# Patient Record
Sex: FEMALE | Race: BLACK OR AFRICAN AMERICAN | NOT HISPANIC OR LATINO | URBAN - METROPOLITAN AREA
[De-identification: names, ages, dates, MRNs, and addresses within clinical notes are randomized per-mention and may not be internally consistent; named-entity substitution may affect disease eponyms.]

---

## 2017-04-01 ENCOUNTER — OUTPATIENT (OUTPATIENT)
Dept: OUTPATIENT SERVICES | Facility: HOSPITAL | Age: 67
LOS: 1 days | End: 2017-04-01
Payer: MEDICAID

## 2017-04-17 DIAGNOSIS — R69 ILLNESS, UNSPECIFIED: ICD-10-CM

## 2017-06-01 PROCEDURE — G9001: CPT

## 2022-09-20 ENCOUNTER — OUTPATIENT (OUTPATIENT)
Dept: OUTPATIENT SERVICES | Facility: HOSPITAL | Age: 72
LOS: 1 days | End: 2022-09-20

## 2022-09-20 VITALS
SYSTOLIC BLOOD PRESSURE: 153 MMHG | OXYGEN SATURATION: 97 % | WEIGHT: 166.01 LBS | RESPIRATION RATE: 16 BRPM | DIASTOLIC BLOOD PRESSURE: 82 MMHG | TEMPERATURE: 98 F | HEART RATE: 97 BPM | HEIGHT: 65 IN

## 2022-09-20 DIAGNOSIS — R54 AGE-RELATED PHYSICAL DEBILITY: ICD-10-CM

## 2022-09-20 DIAGNOSIS — C25.9 MALIGNANT NEOPLASM OF PANCREAS, UNSPECIFIED: ICD-10-CM

## 2022-09-20 DIAGNOSIS — E11.9 TYPE 2 DIABETES MELLITUS WITHOUT COMPLICATIONS: ICD-10-CM

## 2022-09-20 DIAGNOSIS — Z87.898 PERSONAL HISTORY OF OTHER SPECIFIED CONDITIONS: Chronic | ICD-10-CM

## 2022-09-20 DIAGNOSIS — D25.9 LEIOMYOMA OF UTERUS, UNSPECIFIED: Chronic | ICD-10-CM

## 2022-09-20 LAB
A1C WITH ESTIMATED AVERAGE GLUCOSE RESULT: 6.5 % — HIGH (ref 4–5.6)
ALBUMIN SERPL ELPH-MCNC: 4.4 G/DL — SIGNIFICANT CHANGE UP (ref 3.3–5)
ALP SERPL-CCNC: 113 U/L — SIGNIFICANT CHANGE UP (ref 40–120)
ALT FLD-CCNC: 29 U/L — SIGNIFICANT CHANGE UP (ref 4–33)
ANION GAP SERPL CALC-SCNC: 12 MMOL/L — SIGNIFICANT CHANGE UP (ref 7–14)
AST SERPL-CCNC: 20 U/L — SIGNIFICANT CHANGE UP (ref 4–32)
BILIRUB SERPL-MCNC: 0.9 MG/DL — SIGNIFICANT CHANGE UP (ref 0.2–1.2)
BLD GP AB SCN SERPL QL: NEGATIVE — SIGNIFICANT CHANGE UP
BUN SERPL-MCNC: 31 MG/DL — HIGH (ref 7–23)
CALCIUM SERPL-MCNC: 10.4 MG/DL — SIGNIFICANT CHANGE UP (ref 8.4–10.5)
CHLORIDE SERPL-SCNC: 100 MMOL/L — SIGNIFICANT CHANGE UP (ref 98–107)
CO2 SERPL-SCNC: 24 MMOL/L — SIGNIFICANT CHANGE UP (ref 22–31)
CREAT SERPL-MCNC: 1.14 MG/DL — SIGNIFICANT CHANGE UP (ref 0.5–1.3)
EGFR: 51 ML/MIN/1.73M2 — LOW
ESTIMATED AVERAGE GLUCOSE: 140 — SIGNIFICANT CHANGE UP
GLUCOSE SERPL-MCNC: 196 MG/DL — HIGH (ref 70–99)
HCT VFR BLD CALC: 43.7 % — SIGNIFICANT CHANGE UP (ref 34.5–45)
HGB BLD-MCNC: 14.5 G/DL — SIGNIFICANT CHANGE UP (ref 11.5–15.5)
MCHC RBC-ENTMCNC: 30.3 PG — SIGNIFICANT CHANGE UP (ref 27–34)
MCHC RBC-ENTMCNC: 33.2 GM/DL — SIGNIFICANT CHANGE UP (ref 32–36)
MCV RBC AUTO: 91.2 FL — SIGNIFICANT CHANGE UP (ref 80–100)
NRBC # BLD: 0 /100 WBCS — SIGNIFICANT CHANGE UP (ref 0–0)
NRBC # FLD: 0 K/UL — SIGNIFICANT CHANGE UP (ref 0–0)
PLATELET # BLD AUTO: 245 K/UL — SIGNIFICANT CHANGE UP (ref 150–400)
POTASSIUM SERPL-MCNC: 4.2 MMOL/L — SIGNIFICANT CHANGE UP (ref 3.5–5.3)
POTASSIUM SERPL-SCNC: 4.2 MMOL/L — SIGNIFICANT CHANGE UP (ref 3.5–5.3)
PROT SERPL-MCNC: 7.4 G/DL — SIGNIFICANT CHANGE UP (ref 6–8.3)
RBC # BLD: 4.79 M/UL — SIGNIFICANT CHANGE UP (ref 3.8–5.2)
RBC # FLD: 12.5 % — SIGNIFICANT CHANGE UP (ref 10.3–14.5)
RH IG SCN BLD-IMP: POSITIVE — SIGNIFICANT CHANGE UP
SODIUM SERPL-SCNC: 136 MMOL/L — SIGNIFICANT CHANGE UP (ref 135–145)
WBC # BLD: 7.23 K/UL — SIGNIFICANT CHANGE UP (ref 3.8–10.5)
WBC # FLD AUTO: 7.23 K/UL — SIGNIFICANT CHANGE UP (ref 3.8–10.5)

## 2022-09-20 PROCEDURE — 93010 ELECTROCARDIOGRAM REPORT: CPT

## 2022-09-20 RX ORDER — METOPROLOL TARTRATE 50 MG
1 TABLET ORAL
Qty: 0 | Refills: 0 | DISCHARGE

## 2022-09-20 RX ORDER — SODIUM CHLORIDE 9 MG/ML
1000 INJECTION, SOLUTION INTRAVENOUS
Refills: 0 | Status: DISCONTINUED | OUTPATIENT
Start: 2022-09-22 | End: 2022-09-22

## 2022-09-20 NOTE — H&P PST ADULT - PROBLEM SELECTOR PLAN 2
Await medical evaluation with pcp due to advanced age; h/o DM, and having major surgery  * Instructed to take normal am dose of hydralazine, olmesartan, and metoprolol the am of surgery Await prearranged medical evaluation with pcp due to advanced age; h/o DM, and having major surgery  * Instructed to take normal am dose of hydralazine, olmesartan, and metoprolol the am of surgery

## 2022-09-20 NOTE — H&P PST ADULT - HISTORY OF PRESENT ILLNESS
This is a 73 y/o female whose native language is Georgian Creole; comprehends and verbalizes minimal English. Refused hospital ; requested sister Mariaa function as . Patient   presents with diagnosis of pancreatic cancer on endoscopy biopsy. Subsequent CT scan and PET scan confirm pathology. Scheduled for whipple procedure pancreatic cancer

## 2022-09-20 NOTE — H&P PST ADULT - ENDOCRINE COMMENTS
DM type II -- finger sticks in the am range 110-120; in the afternoon 110-200; in the evening 100-200; denies thyroid disease

## 2022-09-20 NOTE — H&P PST ADULT - NSICDXPASTMEDICALHX_GEN_ALL_CORE_FT
PAST MEDICAL HISTORY:  Anxiety and depression     Hypertension     Language barrier native language Korean Creole; comprehends and verbzlizes minimal English    Pancreatic cancer September 2022    Type 2 diabetes mellitus

## 2022-09-20 NOTE — H&P PST ADULT - PROBLEM SELECTOR PLAN 1
This is a 73 y/o female who is scheduled for whipple procedure pancreatic cancer on 9-22-22  * Had covid-19 pcr testing done yesterday  * Given preop and cleanser instructions with good teach back and patient verbalized understanding

## 2022-09-20 NOTE — H&P PST ADULT - PROBLEM SELECTOR PLAN 3
Instructed to stop Invokana 3 days before surgery  * Instructed to take Tresiba 20 units the am of surgery

## 2022-09-20 NOTE — H&P PST ADULT - NSICDXPASTSURGICALHX_GEN_ALL_CORE_FT
PAST SURGICAL HISTORY:  H/O jaundice biliary stent in place    Uterine fibroid hysterectomyu 1988

## 2022-09-21 NOTE — ASU PATIENT PROFILE, ADULT - NSICDXPASTMEDICALHX_GEN_ALL_CORE_FT
PAST MEDICAL HISTORY:  Anxiety and depression     Hypertension     Language barrier native language Lithuanian Creole; comprehends and verbzlizes minimal English    Pancreatic cancer September 2022    Type 2 diabetes mellitus

## 2022-09-22 ENCOUNTER — INPATIENT (INPATIENT)
Facility: HOSPITAL | Age: 72
LOS: 27 days | Discharge: HOME | End: 2022-10-20
Attending: SPECIALIST | Admitting: SPECIALIST
Payer: MEDICARE

## 2022-09-22 VITALS
OXYGEN SATURATION: 98 % | RESPIRATION RATE: 14 BRPM | WEIGHT: 166.01 LBS | HEART RATE: 73 BPM | HEIGHT: 65 IN | SYSTOLIC BLOOD PRESSURE: 151 MMHG | DIASTOLIC BLOOD PRESSURE: 72 MMHG | TEMPERATURE: 98 F

## 2022-09-22 DIAGNOSIS — D25.9 LEIOMYOMA OF UTERUS, UNSPECIFIED: Chronic | ICD-10-CM

## 2022-09-22 DIAGNOSIS — Z87.898 PERSONAL HISTORY OF OTHER SPECIFIED CONDITIONS: Chronic | ICD-10-CM

## 2022-09-22 DIAGNOSIS — C25.9 MALIGNANT NEOPLASM OF PANCREAS, UNSPECIFIED: ICD-10-CM

## 2022-09-22 LAB
ANION GAP SERPL CALC-SCNC: 15 MMOL/L — HIGH (ref 7–14)
APTT BLD: 28.9 SEC — SIGNIFICANT CHANGE UP (ref 27–36.3)
BLOOD GAS ARTERIAL - LYTES,HGB,ICA,LACT RESULT: SIGNIFICANT CHANGE UP
BLOOD GAS ARTERIAL COMPREHENSIVE RESULT: SIGNIFICANT CHANGE UP
BUN SERPL-MCNC: 19 MG/DL — SIGNIFICANT CHANGE UP (ref 7–23)
CALCIUM SERPL-MCNC: 8.6 MG/DL — SIGNIFICANT CHANGE UP (ref 8.4–10.5)
CHLORIDE SERPL-SCNC: 99 MMOL/L — SIGNIFICANT CHANGE UP (ref 98–107)
CO2 SERPL-SCNC: 19 MMOL/L — LOW (ref 22–31)
CREAT SERPL-MCNC: 0.89 MG/DL — SIGNIFICANT CHANGE UP (ref 0.5–1.3)
EGFR: 69 ML/MIN/1.73M2 — SIGNIFICANT CHANGE UP
GAS PNL BLDA: SIGNIFICANT CHANGE UP
GAS PNL BLDA: SIGNIFICANT CHANGE UP
GLUCOSE SERPL-MCNC: 242 MG/DL — HIGH (ref 70–99)
HCT VFR BLD CALC: 40.2 % — SIGNIFICANT CHANGE UP (ref 34.5–45)
HGB BLD-MCNC: 13.2 G/DL — SIGNIFICANT CHANGE UP (ref 11.5–15.5)
INR BLD: 1.36 RATIO — HIGH (ref 0.88–1.16)
MAGNESIUM SERPL-MCNC: 1.4 MG/DL — LOW (ref 1.6–2.6)
MCHC RBC-ENTMCNC: 29.1 PG — SIGNIFICANT CHANGE UP (ref 27–34)
MCHC RBC-ENTMCNC: 32.8 GM/DL — SIGNIFICANT CHANGE UP (ref 32–36)
MCV RBC AUTO: 88.7 FL — SIGNIFICANT CHANGE UP (ref 80–100)
NRBC # BLD: 0 /100 WBCS — SIGNIFICANT CHANGE UP (ref 0–0)
NRBC # FLD: 0 K/UL — SIGNIFICANT CHANGE UP (ref 0–0)
PHOSPHATE SERPL-MCNC: 3.1 MG/DL — SIGNIFICANT CHANGE UP (ref 2.5–4.5)
PLATELET # BLD AUTO: 220 K/UL — SIGNIFICANT CHANGE UP (ref 150–400)
POTASSIUM SERPL-MCNC: 4.2 MMOL/L — SIGNIFICANT CHANGE UP (ref 3.5–5.3)
POTASSIUM SERPL-SCNC: 4.2 MMOL/L — SIGNIFICANT CHANGE UP (ref 3.5–5.3)
PROTHROM AB SERPL-ACNC: 15.8 SEC — HIGH (ref 10.5–13.4)
RBC # BLD: 4.53 M/UL — SIGNIFICANT CHANGE UP (ref 3.8–5.2)
RBC # FLD: 12.7 % — SIGNIFICANT CHANGE UP (ref 10.3–14.5)
SODIUM SERPL-SCNC: 133 MMOL/L — LOW (ref 135–145)
WBC # BLD: 11.68 K/UL — HIGH (ref 3.8–10.5)
WBC # FLD AUTO: 11.68 K/UL — HIGH (ref 3.8–10.5)

## 2022-09-22 PROCEDURE — 88309 TISSUE EXAM BY PATHOLOGIST: CPT | Mod: 26

## 2022-09-22 PROCEDURE — 88331 PATH CONSLTJ SURG 1 BLK 1SPC: CPT | Mod: 26

## 2022-09-22 PROCEDURE — 88332 PATH CONSLTJ SURG EA ADD BLK: CPT | Mod: 26

## 2022-09-22 PROCEDURE — 88342 IMHCHEM/IMCYTCHM 1ST ANTB: CPT | Mod: 26

## 2022-09-22 PROCEDURE — 88304 TISSUE EXAM BY PATHOLOGIST: CPT | Mod: 26

## 2022-09-22 PROCEDURE — 88307 TISSUE EXAM BY PATHOLOGIST: CPT | Mod: 26

## 2022-09-22 PROCEDURE — 99291 CRITICAL CARE FIRST HOUR: CPT

## 2022-09-22 PROCEDURE — 71045 X-RAY EXAM CHEST 1 VIEW: CPT | Mod: 26

## 2022-09-22 PROCEDURE — 88305 TISSUE EXAM BY PATHOLOGIST: CPT | Mod: 26

## 2022-09-22 DEVICE — STAPLER COVIDIEN ENDO GIA 60-3.8MM BLUE: Type: IMPLANTABLE DEVICE | Status: FUNCTIONAL

## 2022-09-22 DEVICE — LIGATING CLIPS WECK HORIZON MEDIUM (BLUE) 24: Type: IMPLANTABLE DEVICE | Status: FUNCTIONAL

## 2022-09-22 DEVICE — LIGATING CLIPS WECK HORIZON LARGE (ORANGE) 24: Type: IMPLANTABLE DEVICE | Status: FUNCTIONAL

## 2022-09-22 DEVICE — STAPLER COVIDIEN TA 90 BLUE: Type: IMPLANTABLE DEVICE | Status: FUNCTIONAL

## 2022-09-22 DEVICE — STAPLER COVIDIEN ENDO GIA 80-3.8MM BLUE RELOAD: Type: IMPLANTABLE DEVICE | Status: FUNCTIONAL

## 2022-09-22 DEVICE — STAPLER COVIDIEN ENDO GIA 80-3.8MM BLUE: Type: IMPLANTABLE DEVICE | Status: FUNCTIONAL

## 2022-09-22 RX ORDER — SODIUM CHLORIDE 9 MG/ML
1000 INJECTION, SOLUTION INTRAVENOUS
Refills: 0 | Status: DISCONTINUED | OUTPATIENT
Start: 2022-09-22 | End: 2022-09-25

## 2022-09-22 RX ORDER — CHLORHEXIDINE GLUCONATE 213 G/1000ML
15 SOLUTION TOPICAL EVERY 12 HOURS
Refills: 0 | Status: DISCONTINUED | OUTPATIENT
Start: 2022-09-22 | End: 2022-09-24

## 2022-09-22 RX ORDER — CHLORHEXIDINE GLUCONATE 213 G/1000ML
1 SOLUTION TOPICAL
Refills: 0 | Status: DISCONTINUED | OUTPATIENT
Start: 2022-09-22 | End: 2022-10-03

## 2022-09-22 RX ORDER — ACETAMINOPHEN 500 MG
1000 TABLET ORAL EVERY 6 HOURS
Refills: 0 | Status: COMPLETED | OUTPATIENT
Start: 2022-09-22 | End: 2022-09-23

## 2022-09-22 RX ORDER — METOPROLOL TARTRATE 50 MG
5 TABLET ORAL EVERY 6 HOURS
Refills: 0 | Status: DISCONTINUED | OUTPATIENT
Start: 2022-09-22 | End: 2022-09-23

## 2022-09-22 RX ORDER — INSULIN LISPRO 100/ML
VIAL (ML) SUBCUTANEOUS EVERY 6 HOURS
Refills: 0 | Status: DISCONTINUED | OUTPATIENT
Start: 2022-09-22 | End: 2022-09-23

## 2022-09-22 RX ORDER — PROPOFOL 10 MG/ML
20 INJECTION, EMULSION INTRAVENOUS
Qty: 1000 | Refills: 0 | Status: DISCONTINUED | OUTPATIENT
Start: 2022-09-22 | End: 2022-09-23

## 2022-09-22 RX ORDER — SODIUM CHLORIDE 9 MG/ML
10 INJECTION INTRAMUSCULAR; INTRAVENOUS; SUBCUTANEOUS
Refills: 0 | Status: DISCONTINUED | OUTPATIENT
Start: 2022-09-22 | End: 2022-10-20

## 2022-09-22 RX ORDER — FENTANYL CITRATE 50 UG/ML
0.5 INJECTION INTRAVENOUS
Qty: 2500 | Refills: 0 | Status: DISCONTINUED | OUTPATIENT
Start: 2022-09-22 | End: 2022-09-23

## 2022-09-22 NOTE — CONSULT NOTE ADULT - ATTENDING COMMENTS
I agree with the history, physical, and plan, which I have reviewed and edited where appropriate.  I agree with notes/assessment of health care providers on my service.  I have personally examined the patient.  I was physically present for the key portions of the evaluation and management (E/M) service provided.  I reviewed data and laboratory tests/x-rays and all pertinent electronic images.  The patient is a critical care patient with life threatening hemodynamic and metabolic instability in SICU.  Risk benefit analyses discussed.    The patient is in SICU with diagnosis mentioned in the note.    The plan is specified below.    73 y/o female French Creole speaking s/p exploratory laparotomy with Whipple procedure for pancreatic cancer on 9/22. Pt remained intubated post-op due to prolonged case. EBL approx 1L. Received blood intraop.     NEUROLOGIC: postoperative pain   - Pain control: Tylenol, dilaudid prn   - precedex if additional sedation is required to tolerate ETT   - PCA when patient is extubated    RESPIRATORY   - AC 10/450/5/40    CARDIOVASCULAR   - Off pressors  - Volume resuscitation as needed   - trend lactate     GASTROINTESTINAL: s/p whipple procedure for pancreatic cancer    - Diet: NPO, NGT  - protonix    - Drain amylases    /RENAL   - IV fluids: LR @ 100 cc/hr  - Maintain hernandez catheter  - Volume resuscitate as needed     HEMATOLOGIC  - Monitor H/H   - SQH for dvt ppx     INFECTIOUS DISEASE  - C/W Cipro and Flagyl x7d for history of biliary stent     ENDOCRINE  - Add lantus, increase insulin sliding scale

## 2022-09-22 NOTE — CONSULT NOTE ADULT - SUBJECTIVE AND OBJECTIVE BOX
=====================================================    SICU Consultation Note    =====================================================    Surgery Information  Exploratory laparotomy, Whipple  Case Duration:  8 hours    EBL:  1100     IV Fluids: 5L   Blood Products: 1pRBC           HISTORY    73 y/o female Spanish Creole speaking presented with diagnosis of pancreatic cancer on endoscopy biopsy. Subsequent CT scan and PET scan confirm pathology. Patient is now s/p exploratory laparotomy with Whipple procedure, and placement of drains R posterior and L anterior to the anastomoses.    Allergies: penicillin (Other)    PAST MEDICAL & SURGICAL HISTORY:  Language barrier  native language Spanish Creole; comprehends and verbzlizes minimal English  Type 2 diabetes mellitus  Anxiety and depression  Hypertension  Pancreatic cancer September 2022  Uterine fibroid hysterectomy 1988  H/O jaundice biliary stent removed in the OR    FAMILY HISTORY:  No pertinent family history in first degree relatives    ALLERGIES  penicillin (Other)    HOME MEDICATIONS:   chlorhexidine 0.12% Liquid 15 milliLiter(s) Oral Mucosa every 12 hours  chlorhexidine 4% Liquid 1 Application(s) Topical <User Schedule>  fentaNYL   Infusion 0.5 MICROgram(s)/kG/Hr IV Continuous <Continuous>  lactated ringers. 1000 milliLiter(s) IV Continuous <Continuous>  lactated ringers. 1000 milliLiter(s) IV Continuous <Continuous>  sodium chloride 0.9% lock flush 10 milliLiter(s) IV Push every 1 hour PRN      CURRENT MEDICATIONS:   --------------------------------------------------------------------------------------  Neurologic Medications  fentaNYL   Infusion 0.5 MICROgram(s)/kG/Hr IV Continuous <Continuous>    Respiratory Medications    Cardiovascular Medications    Gastrointestinal Medications  lactated ringers. 1000 milliLiter(s) IV Continuous <Continuous>  lactated ringers. 1000 milliLiter(s) IV Continuous <Continuous>  sodium chloride 0.9% lock flush 10 milliLiter(s) IV Push every 1 hour PRN Pre/post blood products, medications, blood draw, and to maintain line patency    Genitourinary Medications    Hematologic/Oncologic Medications    Antimicrobial/Immunologic Medications    Endocrine/Metabolic Medications    Topical/Other Medications  chlorhexidine 0.12% Liquid 15 milliLiter(s) Oral Mucosa every 12 hours  chlorhexidine 4% Liquid 1 Application(s) Topical <User Schedule>    --------------------------------------------------------------------------------------    VITAL SIGNS, INS/OUTS (last 24 hours):    T(C): 36.9 (09-22-22 @ 10:28), Max: 36.9 (09-22-22 @ 10:28)  HR: 73 (09-22-22 @ 10:28) (73 - 73)  BP: 151/72 (09-22-22 @ 10:28) (151/72 - 151/72)  ABP: --  ABP(mean): --  RR: 14 (09-22-22 @ 10:28) (14 - 14)  SpO2: 98% (09-22-22 @ 10:28) (98% - 98%)  --------------------------------------------------------------------------------------    EXAM  NEUROLOGY  Exam: Intubated, sedated    HEENT  Exam: Normocephalic, atraumatic.  EOMI     RESPIRATORY  Exam: Lungs clear to auscultation, Normal expansion/effort.    Mechanical Ventilation: AC/CMV    CARDIOVASCULAR  Exam: S1, S2.  Regular rate and rhythm.     GI/NUTRITION  Exam: Abdomen soft, Non-tender, Non-distended.  Gastrostomy / Jejunostomy / Nasogastric tube in place.  Colostomy / Ileostomy.    Current Diet:  NPO    VASCULAR  Exam: Extremities warm, pink, well-perfused.     MUSCULOSKELETAL  Exam: All extremities moving spontaneously without limitations.      SKIN:  Exam: Good skin turgor, no skin breakdown.      METABOLIC/FLUIDS/ELECTROLYTES  lactated ringers. 1000 milliLiter(s) IV Continuous <Continuous>  lactated ringers. 1000 milliLiter(s) IV Continuous <Continuous>      HEMATOLOGIC  [x] DVT Prophylaxis:   Transfusions:	[X] 1 PRBC	[] Platelets		[] FFP	[] Cryoprecipitate    INFECTIOUS DISEASE  Antimicrobials/Immunologic Medications:    Tubes/Lines/Drains Radial a-line, 2 drains L anterior, R posterior to anastomoses  [x] Peripheral IV  [] Central Venous Line     	[] R	[] L	[] IJ	[] Fem	[] SC	Date Placed:   [] Arterial Line		[] R	[] L	[] Fem	[] Rad	[] Ax	Date Placed:   [] PICC:         	[] Midline		[] Mediport  [] Urinary Catheter		Date Placed:     --------------------------------------------------------------------------------------    Follow up postop Labs CBC/BMP/Coags/ABG  -------------------------------------------------------------------------------------     =====================================================    SICU Consultation Note    =====================================================    Surgery Information  Exploratory laparotomy, Whipple  Case Duration:  8 hours    EBL:  1100     IV Fluids: 5L   Blood Products: 1pRBC           HISTORY    73 y/o female Hungarian Creole speaking presented with diagnosis of pancreatic cancer on endoscopy biopsy. Subsequent CT scan and PET scan confirm pathology. Patient is now s/p exploratory laparotomy with Whipple procedure, and placement of drains R posterior and L anterior to the anastomoses.    Allergies: penicillin (Other)    PAST MEDICAL & SURGICAL HISTORY:  Language barrier  native language Hungarian Creole; comprehends and verbzlizes minimal English  Type 2 diabetes mellitus  Anxiety and depression  Hypertension  Pancreatic cancer September 2022  Uterine fibroid hysterectomy 1988  H/O jaundice biliary stent removed in the OR    FAMILY HISTORY:  No pertinent family history in first degree relatives    ALLERGIES  penicillin (Other)    HOME MEDICATIONS:   chlorhexidine 0.12% Liquid 15 milliLiter(s) Oral Mucosa every 12 hours  chlorhexidine 4% Liquid 1 Application(s) Topical <User Schedule>  fentaNYL   Infusion 0.5 MICROgram(s)/kG/Hr IV Continuous <Continuous>  lactated ringers. 1000 milliLiter(s) IV Continuous <Continuous>  lactated ringers. 1000 milliLiter(s) IV Continuous <Continuous>  sodium chloride 0.9% lock flush 10 milliLiter(s) IV Push every 1 hour PRN      CURRENT MEDICATIONS:   --------------------------------------------------------------------------------------  Neurologic Medications  fentaNYL   Infusion 0.5 MICROgram(s)/kG/Hr IV Continuous <Continuous>    Respiratory Medications    Cardiovascular Medications    Gastrointestinal Medications  lactated ringers. 1000 milliLiter(s) IV Continuous <Continuous>  lactated ringers. 1000 milliLiter(s) IV Continuous <Continuous>  sodium chloride 0.9% lock flush 10 milliLiter(s) IV Push every 1 hour PRN Pre/post blood products, medications, blood draw, and to maintain line patency    Genitourinary Medications    Hematologic/Oncologic Medications    Antimicrobial/Immunologic Medications    Endocrine/Metabolic Medications    Topical/Other Medications  chlorhexidine 0.12% Liquid 15 milliLiter(s) Oral Mucosa every 12 hours  chlorhexidine 4% Liquid 1 Application(s) Topical <User Schedule>    --------------------------------------------------------------------------------------    VITAL SIGNS, INS/OUTS (last 24 hours):    T(C): 36.9 (09-22-22 @ 10:28), Max: 36.9 (09-22-22 @ 10:28)  HR: 73 (09-22-22 @ 10:28) (73 - 73)  BP: 151/72 (09-22-22 @ 10:28) (151/72 - 151/72)  ABP: --  ABP(mean): --  RR: 14 (09-22-22 @ 10:28) (14 - 14)  SpO2: 98% (09-22-22 @ 10:28) (98% - 98%)  --------------------------------------------------------------------------------------    EXAM  NEUROLOGY  Exam: Intubated, sedated    HEENT  Exam: Normocephalic, atraumatic.  EOMI     RESPIRATORY  Exam: Lungs clear to auscultation, Normal expansion/effort.    Mechanical Ventilation: AC/CMV    CARDIOVASCULAR  Exam: S1, S2.  Regular rate and rhythm.     GI/NUTRITION  Exam: Abdomen soft, appropriate incisional tenderness, drains in place  Current Diet:  NPO    VASCULAR  Exam: Extremities warm, pink, well-perfused.           SKIN:  Exam: Good skin turgor, no skin breakdown.      METABOLIC/FLUIDS/ELECTROLYTES  lactated ringers. 1000 milliLiter(s) IV Continuous <Continuous>  lactated ringers. 1000 milliLiter(s) IV Continuous <Continuous>      HEMATOLOGIC  [x] DVT Prophylaxis:   Transfusions:	[X] 1 PRBC	[] Platelets		[] FFP	[] Cryoprecipitate    INFECTIOUS DISEASE  Antimicrobials/Immunologic Medications:    Tubes/Lines/Drains Radial a-line, 2 drains L anterior, R posterior to anastomoses  [x] Peripheral IV  [] Central Venous Line     	[] R	[] L	[] IJ	[] Fem	[] SC	Date Placed:   [] Arterial Line		[] R	[] L	[] Fem	[] Rad	[] Ax	Date Placed:   [] PICC:         	[] Midline		[] Mediport  [] Urinary Catheter		Date Placed:     --------------------------------------------------------------------------------------    Follow up postop Labs CBC/BMP/Coags/ABG  -------------------------------------------------------------------------------------     =====================================================    SICU Consultation Note    =====================================================    Surgery Information  Exploratory laparotomy, Whipple  Case Duration:  8 hours    EBL:  1100     IV Fluids: 5L   Blood Products: 1pRBC           HISTORY    71 y/o female Hebrew Creole speaking presented with diagnosis of pancreatic cancer on endoscopy biopsy. Subsequent CT scan and PET scan confirm pathology. Patient is now s/p exploratory laparotomy with Whipple procedure, and placement of drains R posterior and L anterior to the anastomoses.    Allergies: penicillin (Other)    PAST MEDICAL & SURGICAL HISTORY:  Language barrier  Type 2 diabetes mellitus  Anxiety and depression  Hypertension  Pancreatic cancer September 2022  Uterine fibroid hysterectomy 1988  H/O jaundice biliary stent removed in the OR    FAMILY HISTORY:  No pertinent family history in first degree relatives    ALLERGIES  penicillin (Other)    HOME MEDICATIONS:   chlorhexidine 0.12% Liquid 15 milliLiter(s) Oral Mucosa every 12 hours  chlorhexidine 4% Liquid 1 Application(s) Topical <User Schedule>  fentaNYL   Infusion 0.5 MICROgram(s)/kG/Hr IV Continuous <Continuous>  lactated ringers. 1000 milliLiter(s) IV Continuous <Continuous>  lactated ringers. 1000 milliLiter(s) IV Continuous <Continuous>  sodium chloride 0.9% lock flush 10 milliLiter(s) IV Push every 1 hour PRN      CURRENT MEDICATIONS:   --------------------------------------------------------------------------------------  Neurologic Medications  fentaNYL   Infusion 0.5 MICROgram(s)/kG/Hr IV Continuous <Continuous>    Respiratory Medications    Cardiovascular Medications    Gastrointestinal Medications  lactated ringers. 1000 milliLiter(s) IV Continuous <Continuous>  lactated ringers. 1000 milliLiter(s) IV Continuous <Continuous>  sodium chloride 0.9% lock flush 10 milliLiter(s) IV Push every 1 hour PRN Pre/post blood products, medications, blood draw, and to maintain line patency    Genitourinary Medications    Hematologic/Oncologic Medications    Antimicrobial/Immunologic Medications    Endocrine/Metabolic Medications    Topical/Other Medications  chlorhexidine 0.12% Liquid 15 milliLiter(s) Oral Mucosa every 12 hours  chlorhexidine 4% Liquid 1 Application(s) Topical <User Schedule>    --------------------------------------------------------------------------------------    VITAL SIGNS, INS/OUTS (last 24 hours):    T(C): 36.9 (09-22-22 @ 10:28), Max: 36.9 (09-22-22 @ 10:28)  HR: 73 (09-22-22 @ 10:28) (73 - 73)  BP: 151/72 (09-22-22 @ 10:28) (151/72 - 151/72)  ABP: --  ABP(mean): --  RR: 14 (09-22-22 @ 10:28) (14 - 14)  SpO2: 98% (09-22-22 @ 10:28) (98% - 98%)  --------------------------------------------------------------------------------------    EXAM  NEUROLOGY  Exam: Intubated, sedated    HEENT  Exam: Normocephalic, atraumatic.  EOMI     RESPIRATORY  Exam: Lungs clear to auscultation, Normal expansion/effort.    Mechanical Ventilation: AC/CMV    CARDIOVASCULAR  Exam: S1, S2.  Regular rate and rhythm.     GI/NUTRITION  Exam: Abdomen soft, appropriate incisional tenderness, drains in place  Current Diet:  NPO    VASCULAR  Exam: Extremities warm, pink, well-perfused.           SKIN:  Exam: Good skin turgor, no skin breakdown.      METABOLIC/FLUIDS/ELECTROLYTES  lactated ringers. 1000 milliLiter(s) IV Continuous <Continuous>  lactated ringers. 1000 milliLiter(s) IV Continuous <Continuous>      HEMATOLOGIC  [x] DVT Prophylaxis:   Transfusions:	[X] 1 PRBC	[] Platelets		[] FFP	[] Cryoprecipitate    INFECTIOUS DISEASE  Antimicrobials/Immunologic Medications:    Tubes/Lines/Drains Radial a-line, 2 drains L anterior, R posterior to anastomoses  [x] Peripheral IV  [] Central Venous Line     	[] R	[] L	[] IJ	[] Fem	[] SC	Date Placed:   [] Arterial Line		[] R	[] L	[] Fem	[] Rad	[] Ax	Date Placed:   [] PICC:         	[] Midline		[] Mediport  [] Urinary Catheter		Date Placed:     --------------------------------------------------------------------------------------    Follow up postop Labs CBC/BMP/Coags/ABG  -------------------------------------------------------------------------------------     =====================================================    SICU Consultation Note    =====================================================    Surgery Information  Exploratory laparotomy, Whipple  Case Duration:  8 hours    EBL:  1100     IV Fluids: 5L   Blood Products: 1pRBC           HISTORY    71 y/o female Dive[place] Creole speaking presented with diagnosis of pancreatic cancer on endoscopy biopsy. Subsequent CT scan and PET scan confirm pathology. Patient is now s/p exploratory laparotomy with Whipple procedure, and placement of drains R posterior and L anterior to the anastomoses.    Allergies: penicillin (Other)    PAST MEDICAL & SURGICAL HISTORY:  Type 2 diabetes mellitus  Anxiety and depression  Hypertension  Pancreatic cancer September 2022  Uterine fibroid hysterectomy 1988  H/O jaundice biliary stent removed in the OR    FAMILY HISTORY:  No pertinent family history in first degree relatives    ALLERGIES  penicillin (Other)    HOME MEDICATIONS:   chlorhexidine 0.12% Liquid 15 milliLiter(s) Oral Mucosa every 12 hours  chlorhexidine 4% Liquid 1 Application(s) Topical <User Schedule>  fentaNYL   Infusion 0.5 MICROgram(s)/kG/Hr IV Continuous <Continuous>  lactated ringers. 1000 milliLiter(s) IV Continuous <Continuous>  lactated ringers. 1000 milliLiter(s) IV Continuous <Continuous>  sodium chloride 0.9% lock flush 10 milliLiter(s) IV Push every 1 hour PRN      CURRENT MEDICATIONS:   --------------------------------------------------------------------------------------  Neurologic Medications  fentaNYL   Infusion 0.5 MICROgram(s)/kG/Hr IV Continuous <Continuous>    Respiratory Medications    Cardiovascular Medications    Gastrointestinal Medications  lactated ringers. 1000 milliLiter(s) IV Continuous <Continuous>  lactated ringers. 1000 milliLiter(s) IV Continuous <Continuous>  sodium chloride 0.9% lock flush 10 milliLiter(s) IV Push every 1 hour PRN Pre/post blood products, medications, blood draw, and to maintain line patency    Genitourinary Medications    Hematologic/Oncologic Medications    Antimicrobial/Immunologic Medications    Endocrine/Metabolic Medications    Topical/Other Medications  chlorhexidine 0.12% Liquid 15 milliLiter(s) Oral Mucosa every 12 hours  chlorhexidine 4% Liquid 1 Application(s) Topical <User Schedule>    --------------------------------------------------------------------------------------    VITAL SIGNS, INS/OUTS (last 24 hours):    T(C): 36.9 (09-22-22 @ 10:28), Max: 36.9 (09-22-22 @ 10:28)  HR: 73 (09-22-22 @ 10:28) (73 - 73)  BP: 151/72 (09-22-22 @ 10:28) (151/72 - 151/72)  ABP: --  ABP(mean): --  RR: 14 (09-22-22 @ 10:28) (14 - 14)  SpO2: 98% (09-22-22 @ 10:28) (98% - 98%)  --------------------------------------------------------------------------------------    EXAM  NEUROLOGY  Exam: Intubated, sedated    HEENT  Exam: Normocephalic, atraumatic.  EOMI     RESPIRATORY  Exam: Lungs clear to auscultation, Normal expansion/effort.    Mechanical Ventilation: AC/CMV    CARDIOVASCULAR  Exam: S1, S2.  Regular rate and rhythm.     GI/NUTRITION  Exam: Abdomen soft, appropriate incisional tenderness, drains in place  Current Diet:  NPO    VASCULAR  Exam: Extremities warm, pink, well-perfused.           SKIN:  Exam: Good skin turgor, no skin breakdown.      METABOLIC/FLUIDS/ELECTROLYTES  lactated ringers. 1000 milliLiter(s) IV Continuous <Continuous>  lactated ringers. 1000 milliLiter(s) IV Continuous <Continuous>      HEMATOLOGIC  [x] DVT Prophylaxis:   Transfusions:	[X] 1 PRBC	[] Platelets		[] FFP	[] Cryoprecipitate    INFECTIOUS DISEASE  Antimicrobials/Immunologic Medications:    Tubes/Lines/Drains Radial a-line, 2 drains L anterior, R posterior to anastomoses  [x] Peripheral IV  [] Central Venous Line     	[] R	[] L	[] IJ	[] Fem	[] SC	Date Placed:   [] Arterial Line		[] R	[] L	[] Fem	[] Rad	[] Ax	Date Placed:   [] PICC:         	[] Midline		[] Mediport  [] Urinary Catheter		Date Placed:     --------------------------------------------------------------------------------------    Follow up postop Labs CBC/BMP/Coags/ABG  -------------------------------------------------------------------------------------

## 2022-09-23 LAB
AMYLASE FLD-CCNC: 2074 U/L — SIGNIFICANT CHANGE UP
AMYLASE FLD-CCNC: >7500 U/L — SIGNIFICANT CHANGE UP
ANION GAP SERPL CALC-SCNC: 15 MMOL/L — HIGH (ref 7–14)
BLOOD GAS ARTERIAL - LYTES,HGB,ICA,LACT RESULT: SIGNIFICANT CHANGE UP
BLOOD GAS ARTERIAL - LYTES,HGB,ICA,LACT RESULT: SIGNIFICANT CHANGE UP
BUN SERPL-MCNC: 18 MG/DL — SIGNIFICANT CHANGE UP (ref 7–23)
CALCIUM SERPL-MCNC: 8.3 MG/DL — LOW (ref 8.4–10.5)
CHLORIDE SERPL-SCNC: 101 MMOL/L — SIGNIFICANT CHANGE UP (ref 98–107)
CO2 SERPL-SCNC: 19 MMOL/L — LOW (ref 22–31)
CREAT SERPL-MCNC: 0.89 MG/DL — SIGNIFICANT CHANGE UP (ref 0.5–1.3)
EGFR: 69 ML/MIN/1.73M2 — SIGNIFICANT CHANGE UP
GLUCOSE BLDC GLUCOMTR-MCNC: 114 MG/DL — HIGH (ref 70–99)
GLUCOSE BLDC GLUCOMTR-MCNC: 123 MG/DL — HIGH (ref 70–99)
GLUCOSE BLDC GLUCOMTR-MCNC: 153 MG/DL — HIGH (ref 70–99)
GLUCOSE BLDC GLUCOMTR-MCNC: 182 MG/DL — HIGH (ref 70–99)
GLUCOSE BLDC GLUCOMTR-MCNC: 250 MG/DL — HIGH (ref 70–99)
GLUCOSE BLDC GLUCOMTR-MCNC: 270 MG/DL — HIGH (ref 70–99)
GLUCOSE SERPL-MCNC: 173 MG/DL — HIGH (ref 70–99)
HCT VFR BLD CALC: 34.3 % — LOW (ref 34.5–45)
HGB BLD-MCNC: 11.6 G/DL — SIGNIFICANT CHANGE UP (ref 11.5–15.5)
MAGNESIUM SERPL-MCNC: 2.1 MG/DL — SIGNIFICANT CHANGE UP (ref 1.6–2.6)
MCHC RBC-ENTMCNC: 29.7 PG — SIGNIFICANT CHANGE UP (ref 27–34)
MCHC RBC-ENTMCNC: 33.8 GM/DL — SIGNIFICANT CHANGE UP (ref 32–36)
MCV RBC AUTO: 87.7 FL — SIGNIFICANT CHANGE UP (ref 80–100)
NRBC # BLD: 0 /100 WBCS — SIGNIFICANT CHANGE UP (ref 0–0)
NRBC # FLD: 0 K/UL — SIGNIFICANT CHANGE UP (ref 0–0)
PHOSPHATE SERPL-MCNC: 2.3 MG/DL — LOW (ref 2.5–4.5)
PLATELET # BLD AUTO: 206 K/UL — SIGNIFICANT CHANGE UP (ref 150–400)
POTASSIUM SERPL-MCNC: 4 MMOL/L — SIGNIFICANT CHANGE UP (ref 3.5–5.3)
POTASSIUM SERPL-SCNC: 4 MMOL/L — SIGNIFICANT CHANGE UP (ref 3.5–5.3)
RBC # BLD: 3.91 M/UL — SIGNIFICANT CHANGE UP (ref 3.8–5.2)
RBC # FLD: 12.6 % — SIGNIFICANT CHANGE UP (ref 10.3–14.5)
SODIUM SERPL-SCNC: 135 MMOL/L — SIGNIFICANT CHANGE UP (ref 135–145)
WBC # BLD: 10.17 K/UL — SIGNIFICANT CHANGE UP (ref 3.8–10.5)
WBC # FLD AUTO: 10.17 K/UL — SIGNIFICANT CHANGE UP (ref 3.8–10.5)

## 2022-09-23 PROCEDURE — 71045 X-RAY EXAM CHEST 1 VIEW: CPT | Mod: 26

## 2022-09-23 PROCEDURE — 99291 CRITICAL CARE FIRST HOUR: CPT | Mod: 25

## 2022-09-23 RX ORDER — PANTOPRAZOLE SODIUM 20 MG/1
40 TABLET, DELAYED RELEASE ORAL DAILY
Refills: 0 | Status: DISCONTINUED | OUTPATIENT
Start: 2022-09-23 | End: 2022-09-28

## 2022-09-23 RX ORDER — METRONIDAZOLE 500 MG
500 TABLET ORAL EVERY 8 HOURS
Refills: 0 | Status: DISCONTINUED | OUTPATIENT
Start: 2022-09-23 | End: 2022-09-27

## 2022-09-23 RX ORDER — SODIUM CHLORIDE 9 MG/ML
250 INJECTION, SOLUTION INTRAVENOUS ONCE
Refills: 0 | Status: COMPLETED | OUTPATIENT
Start: 2022-09-23 | End: 2022-09-23

## 2022-09-23 RX ORDER — INSULIN LISPRO 100/ML
VIAL (ML) SUBCUTANEOUS EVERY 6 HOURS
Refills: 0 | Status: DISCONTINUED | OUTPATIENT
Start: 2022-09-23 | End: 2022-09-28

## 2022-09-23 RX ORDER — FUROSEMIDE 40 MG
20 TABLET ORAL ONCE
Refills: 0 | Status: COMPLETED | OUTPATIENT
Start: 2022-09-23 | End: 2022-09-23

## 2022-09-23 RX ORDER — INSULIN GLARGINE 100 [IU]/ML
12 INJECTION, SOLUTION SUBCUTANEOUS EVERY MORNING
Refills: 0 | Status: DISCONTINUED | OUTPATIENT
Start: 2022-09-23 | End: 2022-09-23

## 2022-09-23 RX ORDER — DEXMEDETOMIDINE HYDROCHLORIDE IN 0.9% SODIUM CHLORIDE 4 UG/ML
0.5 INJECTION INTRAVENOUS
Qty: 400 | Refills: 0 | Status: DISCONTINUED | OUTPATIENT
Start: 2022-09-23 | End: 2022-09-23

## 2022-09-23 RX ORDER — HYDROMORPHONE HYDROCHLORIDE 2 MG/ML
0.25 INJECTION INTRAMUSCULAR; INTRAVENOUS; SUBCUTANEOUS EVERY 4 HOURS
Refills: 0 | Status: DISCONTINUED | OUTPATIENT
Start: 2022-09-23 | End: 2022-09-24

## 2022-09-23 RX ORDER — HYDROMORPHONE HYDROCHLORIDE 2 MG/ML
0.25 INJECTION INTRAMUSCULAR; INTRAVENOUS; SUBCUTANEOUS EVERY 4 HOURS
Refills: 0 | Status: DISCONTINUED | OUTPATIENT
Start: 2022-09-23 | End: 2022-09-23

## 2022-09-23 RX ORDER — MAGNESIUM SULFATE 500 MG/ML
2 VIAL (ML) INJECTION ONCE
Refills: 0 | Status: COMPLETED | OUTPATIENT
Start: 2022-09-23 | End: 2022-09-23

## 2022-09-23 RX ORDER — ACETAMINOPHEN 500 MG
1000 TABLET ORAL EVERY 6 HOURS
Refills: 0 | Status: COMPLETED | OUTPATIENT
Start: 2022-09-23 | End: 2022-09-24

## 2022-09-23 RX ORDER — SODIUM CHLORIDE 9 MG/ML
500 INJECTION, SOLUTION INTRAVENOUS ONCE
Refills: 0 | Status: COMPLETED | OUTPATIENT
Start: 2022-09-23 | End: 2022-09-23

## 2022-09-23 RX ORDER — ALBUMIN HUMAN 25 %
250 VIAL (ML) INTRAVENOUS ONCE
Refills: 0 | Status: COMPLETED | OUTPATIENT
Start: 2022-09-23 | End: 2022-09-23

## 2022-09-23 RX ORDER — HEPARIN SODIUM 5000 [USP'U]/ML
5000 INJECTION INTRAVENOUS; SUBCUTANEOUS EVERY 8 HOURS
Refills: 0 | Status: DISCONTINUED | OUTPATIENT
Start: 2022-09-23 | End: 2022-09-28

## 2022-09-23 RX ORDER — METOPROLOL TARTRATE 50 MG
5 TABLET ORAL EVERY 6 HOURS
Refills: 0 | Status: DISCONTINUED | OUTPATIENT
Start: 2022-09-23 | End: 2022-09-25

## 2022-09-23 RX ORDER — MAGNESIUM SULFATE 500 MG/ML
2 VIAL (ML) INJECTION ONCE
Refills: 0 | Status: DISCONTINUED | OUTPATIENT
Start: 2022-09-23 | End: 2022-09-23

## 2022-09-23 RX ORDER — METRONIDAZOLE 500 MG
TABLET ORAL
Refills: 0 | Status: DISCONTINUED | OUTPATIENT
Start: 2022-09-23 | End: 2022-09-27

## 2022-09-23 RX ORDER — HYDROMORPHONE HYDROCHLORIDE 2 MG/ML
0.5 INJECTION INTRAMUSCULAR; INTRAVENOUS; SUBCUTANEOUS ONCE
Refills: 0 | Status: DISCONTINUED | OUTPATIENT
Start: 2022-09-23 | End: 2022-09-23

## 2022-09-23 RX ORDER — HYDROMORPHONE HYDROCHLORIDE 2 MG/ML
0.5 INJECTION INTRAMUSCULAR; INTRAVENOUS; SUBCUTANEOUS EVERY 4 HOURS
Refills: 0 | Status: DISCONTINUED | OUTPATIENT
Start: 2022-09-23 | End: 2022-09-23

## 2022-09-23 RX ORDER — HYDROMORPHONE HYDROCHLORIDE 2 MG/ML
0.25 INJECTION INTRAMUSCULAR; INTRAVENOUS; SUBCUTANEOUS ONCE
Refills: 0 | Status: DISCONTINUED | OUTPATIENT
Start: 2022-09-23 | End: 2022-09-23

## 2022-09-23 RX ORDER — HYDROMORPHONE HYDROCHLORIDE 2 MG/ML
1 INJECTION INTRAMUSCULAR; INTRAVENOUS; SUBCUTANEOUS EVERY 4 HOURS
Refills: 0 | Status: DISCONTINUED | OUTPATIENT
Start: 2022-09-23 | End: 2022-09-23

## 2022-09-23 RX ORDER — METRONIDAZOLE 500 MG
500 TABLET ORAL ONCE
Refills: 0 | Status: COMPLETED | OUTPATIENT
Start: 2022-09-23 | End: 2022-09-23

## 2022-09-23 RX ORDER — CIPROFLOXACIN LACTATE 400MG/40ML
400 VIAL (ML) INTRAVENOUS EVERY 12 HOURS
Refills: 0 | Status: DISCONTINUED | OUTPATIENT
Start: 2022-09-23 | End: 2022-09-27

## 2022-09-23 RX ORDER — HYDROMORPHONE HYDROCHLORIDE 2 MG/ML
0.3 INJECTION INTRAMUSCULAR; INTRAVENOUS; SUBCUTANEOUS EVERY 4 HOURS
Refills: 0 | Status: DISCONTINUED | OUTPATIENT
Start: 2022-09-23 | End: 2022-09-24

## 2022-09-23 RX ORDER — INSULIN GLARGINE 100 [IU]/ML
12 INJECTION, SOLUTION SUBCUTANEOUS EVERY MORNING
Refills: 0 | Status: DISCONTINUED | OUTPATIENT
Start: 2022-09-23 | End: 2022-09-25

## 2022-09-23 RX ORDER — METOPROLOL TARTRATE 50 MG
5 TABLET ORAL ONCE
Refills: 0 | Status: COMPLETED | OUTPATIENT
Start: 2022-09-23 | End: 2022-09-23

## 2022-09-23 RX ORDER — PHENYLEPHRINE HYDROCHLORIDE 10 MG/ML
0.3 INJECTION INTRAVENOUS
Qty: 40 | Refills: 0 | Status: DISCONTINUED | OUTPATIENT
Start: 2022-09-23 | End: 2022-09-23

## 2022-09-23 RX ORDER — PHENYLEPHRINE HYDROCHLORIDE 10 MG/ML
0.3 INJECTION INTRAVENOUS
Qty: 160 | Refills: 0 | Status: DISCONTINUED | OUTPATIENT
Start: 2022-09-23 | End: 2022-09-23

## 2022-09-23 RX ADMIN — Medication 100 MILLIGRAM(S): at 06:43

## 2022-09-23 RX ADMIN — HYDROMORPHONE HYDROCHLORIDE 0.25 MILLIGRAM(S): 2 INJECTION INTRAMUSCULAR; INTRAVENOUS; SUBCUTANEOUS at 21:00

## 2022-09-23 RX ADMIN — Medication 25 GRAM(S): at 00:56

## 2022-09-23 RX ADMIN — Medication 5 MILLIGRAM(S): at 02:09

## 2022-09-23 RX ADMIN — HEPARIN SODIUM 5000 UNIT(S): 5000 INJECTION INTRAVENOUS; SUBCUTANEOUS at 11:34

## 2022-09-23 RX ADMIN — PANTOPRAZOLE SODIUM 40 MILLIGRAM(S): 20 TABLET, DELAYED RELEASE ORAL at 11:34

## 2022-09-23 RX ADMIN — FENTANYL CITRATE 3.77 MICROGRAM(S)/KG/HR: 50 INJECTION INTRAVENOUS at 01:21

## 2022-09-23 RX ADMIN — Medication 200 MILLIGRAM(S): at 17:54

## 2022-09-23 RX ADMIN — Medication 2: at 01:21

## 2022-09-23 RX ADMIN — CHLORHEXIDINE GLUCONATE 1 APPLICATION(S): 213 SOLUTION TOPICAL at 05:07

## 2022-09-23 RX ADMIN — SODIUM CHLORIDE 100 MILLILITER(S): 9 INJECTION, SOLUTION INTRAVENOUS at 19:31

## 2022-09-23 RX ADMIN — HYDROMORPHONE HYDROCHLORIDE 0.5 MILLIGRAM(S): 2 INJECTION INTRAMUSCULAR; INTRAVENOUS; SUBCUTANEOUS at 13:56

## 2022-09-23 RX ADMIN — Medication 125 MILLILITER(S): at 03:43

## 2022-09-23 RX ADMIN — Medication 200 MILLIGRAM(S): at 08:30

## 2022-09-23 RX ADMIN — INSULIN GLARGINE 12 UNIT(S): 100 INJECTION, SOLUTION SUBCUTANEOUS at 05:46

## 2022-09-23 RX ADMIN — HYDROMORPHONE HYDROCHLORIDE 0.5 MILLIGRAM(S): 2 INJECTION INTRAMUSCULAR; INTRAVENOUS; SUBCUTANEOUS at 06:06

## 2022-09-23 RX ADMIN — SODIUM CHLORIDE 100 MILLILITER(S): 9 INJECTION, SOLUTION INTRAVENOUS at 01:21

## 2022-09-23 RX ADMIN — Medication 85 MILLIMOLE(S): at 09:03

## 2022-09-23 RX ADMIN — SODIUM CHLORIDE 1000 MILLILITER(S): 9 INJECTION, SOLUTION INTRAVENOUS at 05:07

## 2022-09-23 RX ADMIN — SODIUM CHLORIDE 1000 MILLILITER(S): 9 INJECTION, SOLUTION INTRAVENOUS at 18:45

## 2022-09-23 RX ADMIN — Medication 100 MILLIGRAM(S): at 21:33

## 2022-09-23 RX ADMIN — Medication 400 MILLIGRAM(S): at 11:34

## 2022-09-23 RX ADMIN — Medication 400 MILLIGRAM(S): at 05:23

## 2022-09-23 RX ADMIN — Medication 5 MILLIGRAM(S): at 23:48

## 2022-09-23 RX ADMIN — HYDROMORPHONE HYDROCHLORIDE 0.3 MILLIGRAM(S): 2 INJECTION INTRAMUSCULAR; INTRAVENOUS; SUBCUTANEOUS at 17:55

## 2022-09-23 RX ADMIN — HYDROMORPHONE HYDROCHLORIDE 0.5 MILLIGRAM(S): 2 INJECTION INTRAMUSCULAR; INTRAVENOUS; SUBCUTANEOUS at 05:51

## 2022-09-23 RX ADMIN — Medication 20 MILLIGRAM(S): at 15:00

## 2022-09-23 RX ADMIN — HYDROMORPHONE HYDROCHLORIDE 0.25 MILLIGRAM(S): 2 INJECTION INTRAMUSCULAR; INTRAVENOUS; SUBCUTANEOUS at 20:50

## 2022-09-23 RX ADMIN — HYDROMORPHONE HYDROCHLORIDE 0.5 MILLIGRAM(S): 2 INJECTION INTRAMUSCULAR; INTRAVENOUS; SUBCUTANEOUS at 23:24

## 2022-09-23 RX ADMIN — HYDROMORPHONE HYDROCHLORIDE 0.5 MILLIGRAM(S): 2 INJECTION INTRAMUSCULAR; INTRAVENOUS; SUBCUTANEOUS at 23:30

## 2022-09-23 RX ADMIN — Medication 125 MILLILITER(S): at 20:50

## 2022-09-23 RX ADMIN — Medication 400 MILLIGRAM(S): at 17:54

## 2022-09-23 RX ADMIN — Medication 1000 MILLIGRAM(S): at 01:17

## 2022-09-23 RX ADMIN — HYDROMORPHONE HYDROCHLORIDE 0.25 MILLIGRAM(S): 2 INJECTION INTRAMUSCULAR; INTRAVENOUS; SUBCUTANEOUS at 21:45

## 2022-09-23 RX ADMIN — HYDROMORPHONE HYDROCHLORIDE 0.5 MILLIGRAM(S): 2 INJECTION INTRAMUSCULAR; INTRAVENOUS; SUBCUTANEOUS at 10:00

## 2022-09-23 RX ADMIN — Medication 1000 MILLIGRAM(S): at 23:30

## 2022-09-23 RX ADMIN — HYDROMORPHONE HYDROCHLORIDE 0.25 MILLIGRAM(S): 2 INJECTION INTRAMUSCULAR; INTRAVENOUS; SUBCUTANEOUS at 21:33

## 2022-09-23 RX ADMIN — Medication 2: at 05:23

## 2022-09-23 RX ADMIN — CHLORHEXIDINE GLUCONATE 15 MILLILITER(S): 213 SOLUTION TOPICAL at 05:07

## 2022-09-23 RX ADMIN — Medication 400 MILLIGRAM(S): at 23:24

## 2022-09-23 RX ADMIN — SODIUM CHLORIDE 500 MILLILITER(S): 9 INJECTION, SOLUTION INTRAVENOUS at 03:44

## 2022-09-23 RX ADMIN — HYDROMORPHONE HYDROCHLORIDE 0.25 MILLIGRAM(S): 2 INJECTION INTRAMUSCULAR; INTRAVENOUS; SUBCUTANEOUS at 15:54

## 2022-09-23 RX ADMIN — HYDROMORPHONE HYDROCHLORIDE 0.5 MILLIGRAM(S): 2 INJECTION INTRAMUSCULAR; INTRAVENOUS; SUBCUTANEOUS at 09:45

## 2022-09-23 RX ADMIN — CHLORHEXIDINE GLUCONATE 15 MILLILITER(S): 213 SOLUTION TOPICAL at 17:54

## 2022-09-23 RX ADMIN — HYDROMORPHONE HYDROCHLORIDE 0.5 MILLIGRAM(S): 2 INJECTION INTRAMUSCULAR; INTRAVENOUS; SUBCUTANEOUS at 13:41

## 2022-09-23 RX ADMIN — HYDROMORPHONE HYDROCHLORIDE 0.25 MILLIGRAM(S): 2 INJECTION INTRAMUSCULAR; INTRAVENOUS; SUBCUTANEOUS at 15:39

## 2022-09-23 RX ADMIN — Medication 100 MILLIGRAM(S): at 13:33

## 2022-09-23 RX ADMIN — Medication 2: at 11:42

## 2022-09-23 RX ADMIN — Medication 400 MILLIGRAM(S): at 00:56

## 2022-09-23 RX ADMIN — HYDROMORPHONE HYDROCHLORIDE 0.3 MILLIGRAM(S): 2 INJECTION INTRAMUSCULAR; INTRAVENOUS; SUBCUTANEOUS at 18:10

## 2022-09-23 RX ADMIN — HEPARIN SODIUM 5000 UNIT(S): 5000 INJECTION INTRAVENOUS; SUBCUTANEOUS at 05:23

## 2022-09-23 RX ADMIN — HEPARIN SODIUM 5000 UNIT(S): 5000 INJECTION INTRAVENOUS; SUBCUTANEOUS at 21:33

## 2022-09-23 RX ADMIN — Medication 1000 MILLIGRAM(S): at 05:55

## 2022-09-23 NOTE — DIETITIAN INITIAL EVALUATION ADULT - ADD RECOMMEND
1. Monitor weights, labs, BM's, skin integrity and edema. 2. Follow pt as per protocol. 3. Diet advancement and education as per [facility] protocol.

## 2022-09-23 NOTE — DIETITIAN INITIAL EVALUATION ADULT - PERTINENT MEDS FT
MEDICATIONS  (STANDING):  acetaminophen   IVPB .. 1000 milliGRAM(s) IV Intermittent every 6 hours  chlorhexidine 0.12% Liquid 15 milliLiter(s) Oral Mucosa every 12 hours  chlorhexidine 4% Liquid 1 Application(s) Topical <User Schedule>  ciprofloxacin   IVPB 400 milliGRAM(s) IV Intermittent every 12 hours  heparin   Injectable 5000 Unit(s) SubCutaneous every 8 hours  insulin glargine Injectable (LANTUS) 12 Unit(s) SubCutaneous every morning  insulin lispro (ADMELOG) corrective regimen sliding scale   SubCutaneous every 6 hours  lactated ringers. 1000 milliLiter(s) (100 mL/Hr) IV Continuous <Continuous>  metoprolol tartrate Injectable 5 milliGRAM(s) IV Push every 6 hours  metroNIDAZOLE  IVPB      metroNIDAZOLE  IVPB 500 milliGRAM(s) IV Intermittent every 8 hours  pantoprazole  Injectable 40 milliGRAM(s) IV Push daily    MEDICATIONS  (PRN):  HYDROmorphone  Injectable 0.5 milliGRAM(s) IV Push every 4 hours PRN Moderate Pain (4 - 6)  HYDROmorphone  Injectable 1 milliGRAM(s) IV Push every 4 hours PRN Severe Pain (7 - 10)  sodium chloride 0.9% lock flush 10 milliLiter(s) IV Push every 1 hour PRN Pre/post blood products, medications, blood draw, and to maintain line patency

## 2022-09-23 NOTE — CHART NOTE - NSCHARTNOTEFT_GEN_A_CORE
POST-OPERATIVE NOTE    Subjective:  Patient is s/p ex lap w/ whipple procedure. Recovering appropriately. Pt currently intubated at bedside.    Vital Signs Last 24 Hrs  T(C): 37 (23 Sep 2022 00:00), Max: 37 (23 Sep 2022 00:00)  T(F): 98.6 (23 Sep 2022 00:00), Max: 98.6 (23 Sep 2022 00:00)  HR: 90 (23 Sep 2022 00:20) (73 - 90)  BP: 103/45 (23 Sep 2022 00:00) (103/45 - 151/72)  BP(mean): 60 (23 Sep 2022 00:00) (60 - 84)  RR: 16 (23 Sep 2022 00:20) (10 - 16)  SpO2: 100% (23 Sep 2022 00:20) (98% - 100%)    Parameters below as of 23 Sep 2022 00:20  Patient On (Oxygen Delivery Method): ventilator      I&O's Detail    22 Sep 2022 07:01  -  23 Sep 2022 01:15  --------------------------------------------------------  IN:    FentaNYL: 15 mL    Lactated Ringers: 200 mL  Total IN: 215 mL    OUT:    Drain (mL): 20 mL    Drain (mL): 40 mL    Voided (mL): 125 mL  Total OUT: 185 mL    Total NET: 30 mL        heparin   Injectable 5000    PAST MEDICAL & SURGICAL HISTORY:  Language barrier  native language French Creole; comprehends and verbzlizes minimal English      Type 2 diabetes mellitus      Anxiety and depression      Hypertension      Pancreatic cancer  September 2022      Uterine fibroid  hysterectomyu 1988      H/O jaundice  biliary stent in place            Physical Exam:  General: NAD, resting comfortably in bed  Pulmonary: intubated, in no apparent distress  Cardiovascular: NSR  Abdominal: soft, mildly distended/NT; mildly incision dressing c/d/i w/ no strikethrough; JPx2 holding suction w/ s/s  Extremities: WWP      LABS:                        13.2   11.68 )-----------( 220      ( 22 Sep 2022 23:00 )             40.2     09-22    133<L>  |  99  |  19  ----------------------------<  242<H>  4.2   |  19<L>  |  0.89    Ca    8.6      22 Sep 2022 23:00  Phos  3.1     09-22  Mg     1.40     09-22      PT/INR - ( 22 Sep 2022 23:00 )   PT: 15.8 sec;   INR: 1.36 ratio         PTT - ( 22 Sep 2022 23:00 )  PTT:28.9 sec  CAPILLARY BLOOD GLUCOSE          Radiology and Additional Studies:    Assessment:  The patient is a 72y Female who is now several hours post-op from an ex lap w/ whipple procedure.    Plan:  - Appreciate excellent SICU care  - Pain control as needed  - SQH  - NPO/NGT/IVF  - Melgar  - JPx2 (R posterior to HJ/PJ; L anterior to HJ/PJ) drain management  - OOB and ambulating as tolerated  - F/u AM labs

## 2022-09-23 NOTE — PROGRESS NOTE ADULT - SUBJECTIVE AND OBJECTIVE BOX
COLORECTAL SURGERY PROGRESS NOTE    POD #1 s/p exploratory laparotomy with Whipple procedure, and placement of drains R posterior and L anterior to the anastomoses.    INTERVAL EVENTS  - No acute events overnight    Allergies: penicillin (Other)    PAST MEDICAL & SURGICAL HISTORY:  Language barrier  native language Mongolian Creole; comprehends and verbzlizes minimal English  Type 2 diabetes mellitus  Anxiety and depression  Hypertension  Pancreatic cancer September 2022  Uterine fibroid hysterectomy 1988  H/O jaundice biliary stent removed in the OR    MEDICATIONS  (STANDING):  acetaminophen   IVPB .. 1000 milliGRAM(s) IV Intermittent every 6 hours  chlorhexidine 0.12% Liquid 15 milliLiter(s) Oral Mucosa every 12 hours  chlorhexidine 4% Liquid 1 Application(s) Topical <User Schedule>  ciprofloxacin   IVPB 400 milliGRAM(s) IV Intermittent every 12 hours  heparin   Injectable 5000 Unit(s) SubCutaneous every 8 hours  insulin glargine Injectable (LANTUS) 12 Unit(s) SubCutaneous every morning  insulin lispro (ADMELOG) corrective regimen sliding scale   SubCutaneous every 6 hours  lactated ringers. 1000 milliLiter(s) (100 mL/Hr) IV Continuous <Continuous>  metoprolol tartrate Injectable 5 milliGRAM(s) IV Push every 6 hours  metroNIDAZOLE  IVPB      metroNIDAZOLE  IVPB 500 milliGRAM(s) IV Intermittent every 8 hours  pantoprazole  Injectable 40 milliGRAM(s) IV Push daily    MEDICATIONS  (PRN):  HYDROmorphone  Injectable 0.5 milliGRAM(s) IV Push every 4 hours PRN Moderate Pain (4 - 6)  HYDROmorphone  Injectable 1 milliGRAM(s) IV Push every 4 hours PRN Severe Pain (7 - 10)  sodium chloride 0.9% lock flush 10 milliLiter(s) IV Push every 1 hour PRN Pre/post blood products, medications, blood draw, and to maintain line patency    --------------------------------------------------------------------------------------    VITAL SIGNS, INS/OUTS (last 24 hours):    T(C): 36.9 (09-22-22 @ 10:28), Max: 36.9 (09-22-22 @ 10:28)  HR: 73 (09-22-22 @ 10:28) (73 - 73)  BP: 151/72 (09-22-22 @ 10:28) (151/72 - 151/72)  ABP: --  ABP(mean): --  RR: 14 (09-22-22 @ 10:28) (14 - 14)  SpO2: 98% (09-22-22 @ 10:28) (98% - 98%)  --------------------------------------------------------------------------------------    EXAM  Gen: Intubated, sedated  CV: RRR, S1, S2  Pulm: Normal chest wall expansion on mechanical ventilation AC/CMV mode   Abd: soft, incisions ATTP w/ dressings c/d/i, serosanguinous MARGRET drainage  Ext: Peripheral pulses intact    ---------------------------------------------------------------------------------------  LAB VALUES                        11.6   10.17 )-----------( 206      ( 23 Sep 2022 05:30 )             34.3     09-23    135  |  101  |  18  ----------------------------<  173<H>  4.0   |  19<L>  |  0.89    Ca    8.3<L>      23 Sep 2022 05:30  Phos  2.3     09-23  Mg     2.10     09-23      PT/INR - ( 22 Sep 2022 23:00 )   PT: 15.8 sec;   INR: 1.36 ratio       PTT - ( 22 Sep 2022 23:00 )  PTT:28.9 sec    CAPILLARY BLOOD GLUCOSE  POCT Blood Glucose.: 182 mg/dL (23 Sep 2022 05:21)  POCT Blood Glucose.: 270 mg/dL (23 Sep 2022 02:25)  POCT Blood Glucose.: 250 mg/dL (23 Sep 2022 01:20)      A/P: 72F w/ diagnosis of pancreatic cancer on endoscopy, POD #1 s/p exploratory laparotomy with Whipple procedure, and placement of drains R posterior and L anterior to the anastomoses.  - NPO/IVF hydration  - IV abx: Cipro/Flagyl  - PRN analgesia  - PCA when patient extubated  - Wean sedation/vent per SICU  - Continue hernandez  - Monitor I&Os  - Monitor MARGRET drain output  - Monitor bowel function  - f/u AM labs  - f/u drain amylase  - DVT ppx  - Appreciate excellent care per SICU  - will d/w Dr. Isreal EDOUARD Team Surgery  [id number]

## 2022-09-23 NOTE — DIETITIAN INITIAL EVALUATION ADULT - NSFNSGIIOFT_GEN_A_CORE
09-22-22 @ 07:01  -  09-23-22 @ 07:00  --------------------------------------------------------  OUT:    Nasogastric/Oral tube (mL): 150 mL  Total OUT: 150 mL    Total NET: -150 mL

## 2022-09-23 NOTE — PROGRESS NOTE ADULT - SUBJECTIVE AND OBJECTIVE BOX
Anesthesia Pain Management Service    SUBJECTIVE: Patient is intubated and unable to verbalize but expressing pain by nodding answers. Patient nodding pain is present and IVP Dilaudid helps. Per primary RN, patient is due for Dilaudid push and will be given.    Pain Scale Score	At rest: _7/10__ 	With Activity: ___ 	[X ] Refer to charted pain scores    THERAPY:    [ ] IV PCA Morphine		[ ] 5 mg/mL	[ ] 1 mg/mL  [X ] IV PCA Hydromorphone	[ ] 5 mg/mL	[X ] 1 mg/mL  [ ] IV PCA Fentanyl		[ ] 50 micrograms/mL    Demand dose __0.2_ lockout __6_ (minutes) Continuous Rate _0__ Total: ___  mg used (in past 24 hours)      MEDICATIONS  (STANDING):  acetaminophen   IVPB .. 1000 milliGRAM(s) IV Intermittent every 6 hours  chlorhexidine 0.12% Liquid 15 milliLiter(s) Oral Mucosa every 12 hours  chlorhexidine 4% Liquid 1 Application(s) Topical <User Schedule>  ciprofloxacin   IVPB 400 milliGRAM(s) IV Intermittent every 12 hours  heparin   Injectable 5000 Unit(s) SubCutaneous every 8 hours  insulin glargine Injectable (LANTUS) 12 Unit(s) SubCutaneous every morning  insulin lispro (ADMELOG) corrective regimen sliding scale   SubCutaneous every 6 hours  lactated ringers. 1000 milliLiter(s) (100 mL/Hr) IV Continuous <Continuous>  metoprolol tartrate Injectable 5 milliGRAM(s) IV Push every 6 hours  metroNIDAZOLE  IVPB      metroNIDAZOLE  IVPB 500 milliGRAM(s) IV Intermittent every 8 hours  pantoprazole  Injectable 40 milliGRAM(s) IV Push daily    MEDICATIONS  (PRN):  HYDROmorphone  Injectable 0.5 milliGRAM(s) IV Push every 4 hours PRN Moderate Pain (4 - 6)  HYDROmorphone  Injectable 1 milliGRAM(s) IV Push every 4 hours PRN Severe Pain (7 - 10)  sodium chloride 0.9% lock flush 10 milliLiter(s) IV Push every 1 hour PRN Pre/post blood products, medications, blood draw, and to maintain line patency      OBJECTIVE: Patient sitting up in bed, intubated.    Sedation Score:	[ X] Alert	[ ] Drowsy 	[ ] Arousable	[ ] Asleep	[ ] Unresponsive    Side Effects:	[X ] None	[ ] Nausea	[ ] Vomiting	[ ] Pruritus  		[ ] Other:    Vital Signs Last 24 Hrs  T(C): 36.8 (23 Sep 2022 08:00), Max: 37.3 (23 Sep 2022 04:00)  T(F): 98.2 (23 Sep 2022 08:00), Max: 99.2 (23 Sep 2022 04:00)  HR: 75 (23 Sep 2022 10:00) (64 - 115)  BP: 98/44 (23 Sep 2022 04:00) (98/44 - 158/57)  BP(mean): 59 (23 Sep 2022 04:00) (59 - 95)  RR: 12 (23 Sep 2022 10:00) (10 - 28)  SpO2: 100% (23 Sep 2022 10:00) (98% - 100%)    Parameters below as of 23 Sep 2022 08:00  Patient On (Oxygen Delivery Method): ventilator    O2 Concentration (%): 40    ASSESSMENT/ PLAN    Therapy to  be:	[ X] Continue   [ ] Discontinued   [ ] Change to prn Analgesics    Documentation and Verification of current medications:   [X] Done	[ ] Not done, not elligible    Comments: IV PCA orders on hold until extubation. IVP Dilaudid PRN pain. Recommend non-opioid adjuvant analgesics to be used when possible and when allowed by primary surgical team.    Progress Note written now but Patient was seen earlier.

## 2022-09-23 NOTE — PROGRESS NOTE ADULT - SUBJECTIVE AND OBJECTIVE BOX
· Subjective and Objective:     =====================================================    SICU Consultation Note    =====================================================    Surgery Information  Exploratory laparotomy, Whipple  Case Duration:  8 hours    EBL:  1100     IV Fluids: 5L   Blood Products: 1pRBC           HISTORY    73 y/o female [place] Creole speaking presented with diagnosis of pancreatic cancer on endoscopy biopsy. Subsequent CT scan and PET scan confirm pathology. Patient is now s/p exploratory laparotomy with Whipple procedure, and placement of drains R posterior and L anterior to the anastomoses.    Allergies: penicillin (Other)    PAST MEDICAL & SURGICAL HISTORY:  Language barrier  native language [place]ian Creole; comprehends and verbzlizes minimal English  Type 2 diabetes mellitus  Anxiety and depression  Hypertension  Pancreatic cancer September 2022  Uterine fibroid hysterectomy 1988  H/O jaundice biliary stent removed in the OR    FAMILY HISTORY:  No pertinent family history in first degree relatives    ALLERGIES  penicillin (Other)    HOME MEDICATIONS:   chlorhexidine 0.12% Liquid 15 milliLiter(s) Oral Mucosa every 12 hours  chlorhexidine 4% Liquid 1 Application(s) Topical <User Schedule>  fentaNYL   Infusion 0.5 MICROgram(s)/kG/Hr IV Continuous <Continuous>  lactated ringers. 1000 milliLiter(s) IV Continuous <Continuous>  lactated ringers. 1000 milliLiter(s) IV Continuous <Continuous>  sodium chloride 0.9% lock flush 10 milliLiter(s) IV Push every 1 hour PRN      CURRENT MEDICATIONS:   --------------------------------------------------------------------------------------  Neurologic Medications  fentaNYL   Infusion 0.5 MICROgram(s)/kG/Hr IV Continuous <Continuous>    Respiratory Medications    Cardiovascular Medications    Gastrointestinal Medications  lactated ringers. 1000 milliLiter(s) IV Continuous <Continuous>  lactated ringers. 1000 milliLiter(s) IV Continuous <Continuous>  sodium chloride 0.9% lock flush 10 milliLiter(s) IV Push every 1 hour PRN Pre/post blood products, medications, blood draw, and to maintain line patency    Genitourinary Medications    Hematologic/Oncologic Medications    Antimicrobial/Immunologic Medications    Endocrine/Metabolic Medications    Topical/Other Medications  chlorhexidine 0.12% Liquid 15 milliLiter(s) Oral Mucosa every 12 hours  chlorhexidine 4% Liquid 1 Application(s) Topical <User Schedule>    --------------------------------------------------------------------------------------    VITAL SIGNS, INS/OUTS (last 24 hours):    T(C): 36.9 (09-22-22 @ 10:28), Max: 36.9 (09-22-22 @ 10:28)  HR: 73 (09-22-22 @ 10:28) (73 - 73)  BP: 151/72 (09-22-22 @ 10:28) (151/72 - 151/72)  ABP: --  ABP(mean): --  RR: 14 (09-22-22 @ 10:28) (14 - 14)  SpO2: 98% (09-22-22 @ 10:28) (98% - 98%)  --------------------------------------------------------------------------------------    EXAM  NEUROLOGY  Exam: Intubated, sedated    HEENT  Exam: Normocephalic, atraumatic.  EOMI     RESPIRATORY  Exam: Lungs clear to auscultation, Normal expansion/effort.    Mechanical Ventilation: AC/CMV    CARDIOVASCULAR  Exam: S1, S2.  Regular rate and rhythm.     GI/NUTRITION  Exam: Abdomen soft, appropriate incisional tenderness.   Current Diet:  NPO    VASCULAR  Exam: Extremities warm, pink, well-perfused.     MUSCULOSKELETAL  Exam: All extremities moving spontaneously without limitations.      SKIN:  Exam: Good skin turgor, no skin breakdown.      METABOLIC/FLUIDS/ELECTROLYTES  lactated ringers. 1000 milliLiter(s) IV Continuous <Continuous>  lactated ringers. 1000 milliLiter(s) IV Continuous <Continuous>      HEMATOLOGIC  [x] DVT Prophylaxis:   Transfusions:	[X] 1 PRBC	[] Platelets		[] FFP	[] Cryoprecipitate    INFECTIOUS DISEASE  Antimicrobials/Immunologic Medications:    Tubes/Lines/Drains Radial a-line, 2 drains L anterior, R posterior to anastomoses  [x] Peripheral IV  [] Central Venous Line     	[] R	[] L	[] IJ	[] Fem	[] SC	Date Placed:   [] Arterial Line		[] R	[] L	[] Fem	[] Rad	[] Ax	Date Placed:   [] PICC:         	[] Midline		[] Mediport  [] Urinary Catheter		Date Placed:     --------------------------------------------------------------------------------------    Follow up postop Labs CBC/BMP/Coags/ABG   =====================================================    SICU Daily progress note    =====================================================    Surgery Information  Exploratory laparotomy, Whipple  Case Duration:  8 hours    EBL:  1100     IV Fluids: 5L   Blood Products: 1pRBC           HISTORY    71 y/o female [place] Creole speaking presented with diagnosis of pancreatic cancer on endoscopy biopsy. Subsequent CT scan and PET scan confirm pathology. Patient is now s/p exploratory laparotomy with Whipple procedure, and placement of drains R posterior and L anterior to the anastomoses.    Allergies: penicillin (Other)    PAST MEDICAL & SURGICAL HISTORY:  Language barrier  native language [place] Creole; comprehends and verbzlizes minimal English  Type 2 diabetes mellitus  Anxiety and depression  Hypertension  Pancreatic cancer September 2022  Uterine fibroid hysterectomy 1988  H/O jaundice biliary stent removed in the OR    FAMILY HISTORY:  No pertinent family history in first degree relatives    ALLERGIES  penicillin (Other)    HOME MEDICATIONS:   chlorhexidine 0.12% Liquid 15 milliLiter(s) Oral Mucosa every 12 hours  chlorhexidine 4% Liquid 1 Application(s) Topical <User Schedule>  fentaNYL   Infusion 0.5 MICROgram(s)/kG/Hr IV Continuous <Continuous>  lactated ringers. 1000 milliLiter(s) IV Continuous <Continuous>  lactated ringers. 1000 milliLiter(s) IV Continuous <Continuous>  sodium chloride 0.9% lock flush 10 milliLiter(s) IV Push every 1 hour PRN      CURRENT MEDICATIONS:   --------------------------------------------------------------------------------------  Neurologic Medications  fentaNYL   Infusion 0.5 MICROgram(s)/kG/Hr IV Continuous <Continuous>    Respiratory Medications    Cardiovascular Medications    Gastrointestinal Medications  lactated ringers. 1000 milliLiter(s) IV Continuous <Continuous>  lactated ringers. 1000 milliLiter(s) IV Continuous <Continuous>  sodium chloride 0.9% lock flush 10 milliLiter(s) IV Push every 1 hour PRN Pre/post blood products, medications, blood draw, and to maintain line patency    Genitourinary Medications    Hematologic/Oncologic Medications    Antimicrobial/Immunologic Medications    Endocrine/Metabolic Medications    Topical/Other Medications  chlorhexidine 0.12% Liquid 15 milliLiter(s) Oral Mucosa every 12 hours  chlorhexidine 4% Liquid 1 Application(s) Topical <User Schedule>    --------------------------------------------------------------------------------------    VITAL SIGNS, INS/OUTS (last 24 hours):    T(C): 36.9 (09-22-22 @ 10:28), Max: 36.9 (09-22-22 @ 10:28)  HR: 73 (09-22-22 @ 10:28) (73 - 73)  BP: 151/72 (09-22-22 @ 10:28) (151/72 - 151/72)  ABP: --  ABP(mean): --  RR: 14 (09-22-22 @ 10:28) (14 - 14)  SpO2: 98% (09-22-22 @ 10:28) (98% - 98%)  --------------------------------------------------------------------------------------    EXAM  NEUROLOGY  Exam: Intubated, sedated    HEENT  Exam: Normocephalic, atraumatic.  EOMI     RESPIRATORY  Exam: Lungs clear to auscultation, Normal expansion/effort.    Mechanical Ventilation: AC/CMV    CARDIOVASCULAR  Exam: S1, S2.  Regular rate and rhythm.     GI/NUTRITION  Exam: Abdomen soft, appropriate incisional tenderness. Drains in place.  Current Diet:  NPO    VASCULAR  Exam: Extremities warm, pink, well-perfused.     SKIN:  Exam: Good skin turgor, no skin breakdown.      METABOLIC/FLUIDS/ELECTROLYTES  lactated ringers. 1000 milliLiter(s) IV Continuous <Continuous>  lactated ringers. 1000 milliLiter(s) IV Continuous <Continuous>      HEMATOLOGIC  [x] DVT Prophylaxis:   Transfusions:	[X] 1 PRBC	[] Platelets		[] FFP	[] Cryoprecipitate    INFECTIOUS DISEASE  Antimicrobials/Immunologic Medications:    Tubes/Lines/Drains Radial a-line, 2 drains L anterior, R posterior to anastomoses  [x] Peripheral IV  [] Central Venous Line     	[] R	[] L	[] IJ	[] Fem	[] SC	Date Placed:   [] Arterial Line		[] R	[] L	[] Fem	[] Rad	[] Ax	Date Placed:   [] PICC:         	[] Midline		[] Mediport  [] Urinary Catheter		Date Placed:     --------------------------------------------------------------------------------------    Follow up postop Labs CBC/BMP/Coags/ABG   =====================================================    SICU Daily progress note    =====================================================    Surgery Information  Exploratory laparotomy, Whipple  Case Duration:  8 hours    EBL:  1100     IV Fluids: 5L   Blood Products: 1pRBC           HISTORY    71 y/o female [place] Creole speaking presented with diagnosis of pancreatic cancer on endoscopy biopsy. Subsequent CT scan and PET scan confirm pathology. Patient is now s/p exploratory laparotomy with Whipple procedure, and placement of drains R posterior and L anterior to the anastomoses.    INTERVAL EVENTS  - No acute events overnight    Allergies: penicillin (Other)    PAST MEDICAL & SURGICAL HISTORY:  Language barrier  native language [place]ian Creole; comprehends and verbzlizes minimal English  Type 2 diabetes mellitus  Anxiety and depression  Hypertension  Pancreatic cancer September 2022  Uterine fibroid hysterectomy 1988  H/O jaundice biliary stent removed in the OR    FAMILY HISTORY:  No pertinent family history in first degree relatives    ALLERGIES  penicillin (Other)    HOME MEDICATIONS:   chlorhexidine 0.12% Liquid 15 milliLiter(s) Oral Mucosa every 12 hours  chlorhexidine 4% Liquid 1 Application(s) Topical <User Schedule>  fentaNYL   Infusion 0.5 MICROgram(s)/kG/Hr IV Continuous <Continuous>  lactated ringers. 1000 milliLiter(s) IV Continuous <Continuous>  lactated ringers. 1000 milliLiter(s) IV Continuous <Continuous>  sodium chloride 0.9% lock flush 10 milliLiter(s) IV Push every 1 hour PRN      CURRENT MEDICATIONS:   --------------------------------------------------------------------------------------  Neurologic Medications  fentaNYL   Infusion 0.5 MICROgram(s)/kG/Hr IV Continuous <Continuous>    Respiratory Medications    Cardiovascular Medications    Gastrointestinal Medications  lactated ringers. 1000 milliLiter(s) IV Continuous <Continuous>  lactated ringers. 1000 milliLiter(s) IV Continuous <Continuous>  sodium chloride 0.9% lock flush 10 milliLiter(s) IV Push every 1 hour PRN Pre/post blood products, medications, blood draw, and to maintain line patency    Genitourinary Medications    Hematologic/Oncologic Medications    Antimicrobial/Immunologic Medications    Endocrine/Metabolic Medications    Topical/Other Medications  chlorhexidine 0.12% Liquid 15 milliLiter(s) Oral Mucosa every 12 hours  chlorhexidine 4% Liquid 1 Application(s) Topical <User Schedule>    --------------------------------------------------------------------------------------    VITAL SIGNS, INS/OUTS (last 24 hours):    T(C): 36.9 (09-22-22 @ 10:28), Max: 36.9 (09-22-22 @ 10:28)  HR: 73 (09-22-22 @ 10:28) (73 - 73)  BP: 151/72 (09-22-22 @ 10:28) (151/72 - 151/72)  ABP: --  ABP(mean): --  RR: 14 (09-22-22 @ 10:28) (14 - 14)  SpO2: 98% (09-22-22 @ 10:28) (98% - 98%)  --------------------------------------------------------------------------------------    EXAM  NEUROLOGY  Exam: Intubated, sedated    HEENT  Exam: Normocephalic, atraumatic.  EOMI     RESPIRATORY  Exam: Lungs clear to auscultation, Normal expansion/effort.    Mechanical Ventilation: AC/CMV    CARDIOVASCULAR  Exam: S1, S2.  Regular rate and rhythm.     GI/NUTRITION  Exam: Abdomen soft, appropriate incisional tenderness. Drains in place.  Current Diet:  NPO    VASCULAR  Exam: Extremities warm, pink, well-perfused.     SKIN:  Exam: Good skin turgor, no skin breakdown.      METABOLIC/FLUIDS/ELECTROLYTES  lactated ringers. 1000 milliLiter(s) IV Continuous <Continuous>  lactated ringers. 1000 milliLiter(s) IV Continuous <Continuous>      HEMATOLOGIC  [x] DVT Prophylaxis:   Transfusions:	[X] 1 PRBC	[] Platelets		[] FFP	[] Cryoprecipitate    INFECTIOUS DISEASE  Antimicrobials/Immunologic Medications:    Tubes/Lines/Drains Radial a-line, 2 drains L anterior, R posterior to anastomoses  [x] Peripheral IV  [] Central Venous Line     	[] R	[] L	[] IJ	[] Fem	[] SC	Date Placed:   [] Arterial Line		[] R	[] L	[] Fem	[] Rad	[] Ax	Date Placed:   [] PICC:         	[] Midline		[] Mediport  [] Urinary Catheter		Date Placed:     --------------------------------------------------------------------------------------    Follow up postop Labs CBC/BMP/Coags/ABG   =====================================================    SICU Daily progress note    =====================================================    INTERVAL EVENTS  - CPAP this AM, plan to extubate if tolerating  - weaned off sedation and pressors   - Lopressor 5 mg q6  - Hyperglycemic to 250s --> lantus 12, mod ISS    Surgery Information  Exploratory laparotomy, Whipple  Case Duration:  8 hours    EBL:  1100     IV Fluids: 5L   Blood Products: 1pRBC           HISTORY    73 y/o female Vietnamese Creole speaking presented with diagnosis of pancreatic cancer on endoscopy biopsy. Subsequent CT scan and PET scan confirm pathology. Patient is now s/p exploratory laparotomy with Whipple procedure, and placement of drains R posterior and L anterior to the anastomoses.    INTERVAL EVENTS  - No acute events overnight    Allergies: penicillin (Other)    PAST MEDICAL & SURGICAL HISTORY:  Language barrier  native language Vietnamese Creole; comprehends and verbzlizes minimal English  Type 2 diabetes mellitus  Anxiety and depression  Hypertension  Pancreatic cancer September 2022  Uterine fibroid hysterectomy 1988  H/O jaundice biliary stent removed in the OR    FAMILY HISTORY:  No pertinent family history in first degree relatives    ALLERGIES  penicillin (Other)    HOME MEDICATIONS:   chlorhexidine 0.12% Liquid 15 milliLiter(s) Oral Mucosa every 12 hours  chlorhexidine 4% Liquid 1 Application(s) Topical <User Schedule>  fentaNYL   Infusion 0.5 MICROgram(s)/kG/Hr IV Continuous <Continuous>  lactated ringers. 1000 milliLiter(s) IV Continuous <Continuous>  lactated ringers. 1000 milliLiter(s) IV Continuous <Continuous>  sodium chloride 0.9% lock flush 10 milliLiter(s) IV Push every 1 hour PRN      CURRENT MEDICATIONS:   --------------------------------------------------------------------------------------  Neurologic Medications  fentaNYL   Infusion 0.5 MICROgram(s)/kG/Hr IV Continuous <Continuous>    Respiratory Medications    Cardiovascular Medications    Gastrointestinal Medications  lactated ringers. 1000 milliLiter(s) IV Continuous <Continuous>  lactated ringers. 1000 milliLiter(s) IV Continuous <Continuous>  sodium chloride 0.9% lock flush 10 milliLiter(s) IV Push every 1 hour PRN Pre/post blood products, medications, blood draw, and to maintain line patency    Genitourinary Medications    Hematologic/Oncologic Medications    Antimicrobial/Immunologic Medications    Endocrine/Metabolic Medications    Topical/Other Medications  chlorhexidine 0.12% Liquid 15 milliLiter(s) Oral Mucosa every 12 hours  chlorhexidine 4% Liquid 1 Application(s) Topical <User Schedule>    --------------------------------------------------------------------------------------    VITAL SIGNS, INS/OUTS (last 24 hours):    T(C): 36.9 (09-22-22 @ 10:28), Max: 36.9 (09-22-22 @ 10:28)  HR: 73 (09-22-22 @ 10:28) (73 - 73)  BP: 151/72 (09-22-22 @ 10:28) (151/72 - 151/72)  ABP: --  ABP(mean): --  RR: 14 (09-22-22 @ 10:28) (14 - 14)  SpO2: 98% (09-22-22 @ 10:28) (98% - 98%)  --------------------------------------------------------------------------------------    EXAM  NEUROLOGY  Exam: Intubated, sedated    HEENT  Exam: Normocephalic, atraumatic.  EOMI     RESPIRATORY  Exam: Lungs clear to auscultation, Normal expansion/effort.    Mechanical Ventilation: AC/CMV    CARDIOVASCULAR  Exam: S1, S2.  Regular rate and rhythm.     GI/NUTRITION  Exam: Abdomen soft, appropriate incisional tenderness. Drains in place.  Current Diet:  NPO    VASCULAR  Exam: Extremities warm, pink, well-perfused.     SKIN:  Exam: Good skin turgor, no skin breakdown.      METABOLIC/FLUIDS/ELECTROLYTES  lactated ringers. 1000 milliLiter(s) IV Continuous <Continuous>  lactated ringers. 1000 milliLiter(s) IV Continuous <Continuous>      HEMATOLOGIC  [x] DVT Prophylaxis:   Transfusions:	[X] 1 PRBC	[] Platelets		[] FFP	[] Cryoprecipitate    INFECTIOUS DISEASE  Antimicrobials/Immunologic Medications:    Tubes/Lines/Drains Radial a-line, 2 drains L anterior, R posterior to anastomoses  [x] Peripheral IV  [] Central Venous Line     	[] R	[] L	[] IJ	[] Fem	[] SC	Date Placed:   [] Arterial Line		[] R	[] L	[] Fem	[] Rad	[] Ax	Date Placed:   [] PICC:         	[] Midline		[] Mediport  [] Urinary Catheter		Date Placed:     --------------------------------------------------------------------------------------    Follow up postop Labs CBC/BMP/Coags/ABG

## 2022-09-23 NOTE — DIETITIAN INITIAL EVALUATION ADULT - OTHER INFO
73 y/o female French Creole speaking presented with diagnosis of pancreatic cancer on endoscopy biopsy. Subsequent CT scan and PET scan confirm pathology. Patient is now s/p exploratory laparotomy with Whipple procedure, and placement of drains R posterior and L anterior to the anastomoses. Attempted to speak with patient, however pt was having difficulty breathing with RN assisting patient. Left copy of Whipple therapeutic diet at patient's bedside for family to review. Education to be provided as per Whipple protocol. Diet to be advanced as per Whipple protocol as well. No GI distress at present; hypoactive bowel noted. Receiving IVF of LR and IVPB fluids. NGT to LCWS with 150 mL output 9/23.  - 270 mg/dl with Lantus and Admelog insulins ordered for coverage. RDN services to remain available as needed.

## 2022-09-23 NOTE — PROGRESS NOTE ADULT - ASSESSMENT
71 y/o female [place] Creole speaking presented with diagnosis of pancreatic cancer on endoscopy biopsy. Subsequent CT scan and PET scan confirm pathology. Patient is now s/p exploratory laparotomy with Whipple procedure, and placement of drains R posterior and L anterior to the anastomoses.    NEUROLOGIC   - Pain control: Tylenol  - Fentanyl  - PCA when patient is extubated    RESPIRATORY   - Monitor SpO2 goal >92%  - Incentive spirometry   - AC 10/450/5/40    CARDIOVASCULAR   - Monitor hemodynamics   - Off pressors    GASTROINTESTINAL   - Diet: NPO  - Monitor bowel function  - Monitor drain output  - Drain amylases  - NOT on Whipple pathway    /RENAL   - IV fluids: LR @ 100 cc/hr  - Strict I/Os  - Monitor BMP qd  - Maintain hernandez catheter, strict Is/Os  - Monitor electrolytes, replete PRN    HEMATOLOGIC  - Monitor H/H   - DVT ppx to start at 4AM    INFECTIOUS DISEASE  - Monitor fever / WBC  - C/W Cipro and Flagyl x7d    ENDOCRINE  - Monitor gluc    LINES  - A line L radial  - Hernandez  - PIV     DISPO: SICU   73 y/o female Citizen of the [place] Creole speaking presented with diagnosis of pancreatic cancer on endoscopy biopsy. Subsequent CT scan and PET scan confirm pathology. Patient is now s/p exploratory laparotomy with Whipple procedure, and placement of drains R posterior and L anterior to the anastomoses.    NEUROLOGIC   - Pain control: Tylenol  - Off fentanyl, on Dilaudid IV Push PRN  - PCA when patient is extubated    RESPIRATORY   - Monitor SpO2 goal >92%  - Incentive spirometry   - AC 10/450/5/40    CARDIOVASCULAR   - Monitor hemodynamics   - Off pressors    GASTROINTESTINAL   - Diet: NPO  - Monitor bowel function  - Monitor drain output  - Drain amylases  - NOT on Whipple pathway    /RENAL   - IV fluids: LR @ 100 cc/hr  - Strict I/Os  - Monitor BMP qd  - Maintain hernandez catheter, strict Is/Os  - Monitor electrolytes, replete PRN    HEMATOLOGIC  - Monitor H/H   - DVT ppx to start at 4AM    INFECTIOUS DISEASE  - Monitor fever / WBC  - C/W Cipro and Flagyl x7d    ENDOCRINE  - Monitor gluc    LINES  - A line L radial  - Hernandez  - PIV     DISPO: SICU   71 y/o female [place] Creole speaking presented with diagnosis of pancreatic cancer on endoscopy biopsy. Subsequent CT scan and PET scan confirm pathology. Patient is now s/p exploratory laparotomy with Whipple procedure, and placement of drains R posterior and L anterior to the anastomoses.      PLAN  NEUROLOGIC   - Pain control: Tylenol  - Off fentanyl, on Dilaudid IV Push PRN  - PCA when patient is extubated    RESPIRATORY   - Monitor SpO2 goal >92%  - Incentive spirometry   - CPAP trials, extubate if tolerating    CARDIOVASCULAR   - Monitor hemodynamics   - Off pressors    GASTROINTESTINAL   - Diet: NPO  - Monitor bowel function  - Monitor drain output  - Drain amylases  - NOT on Whipple pathway    /RENAL   - IV fluids: LR @ 100 cc/hr  - Strict I/Os  - Monitor BMP qd  - Maintain hernandez catheter, strict Is/Os  - Monitor electrolytes, replete PRN    HEMATOLOGIC  - Monitor H/H   - DVT ppx SQH    INFECTIOUS DISEASE  - Monitor fever / WBC  - C/W Cipro and Flagyl x7d per primary team    ENDOCRINE  - Monitor gluc    LINES  - A line L radial  - Hernandez  - PIV     DISPO: SICU

## 2022-09-23 NOTE — PATIENT PROFILE ADULT - FALL HARM RISK - UNIVERSAL INTERVENTIONS
Bed in lowest position, wheels locked, appropriate side rails in place/Call bell, personal items and telephone in reach/Instruct patient to call for assistance before getting out of bed or chair/Non-slip footwear when patient is out of bed/[place] to call system/Physically safe environment - no spills, clutter or unnecessary equipment/Purposeful Proactive Rounding/Room/bathroom lighting operational, light cord in reach

## 2022-09-23 NOTE — DIETITIAN INITIAL EVALUATION ADULT - NSICDXPASTMEDICALHX_GEN_ALL_CORE_FT
PAST MEDICAL HISTORY:  Anxiety and depression     Hypertension     Language barrier native language Japanese Creole; comprehends and verbzlizes minimal English    Pancreatic cancer September 2022    Type 2 diabetes mellitus

## 2022-09-24 LAB
ALBUMIN SERPL ELPH-MCNC: 3 G/DL — LOW (ref 3.3–5)
ALP SERPL-CCNC: 45 U/L — SIGNIFICANT CHANGE UP (ref 40–120)
ALT FLD-CCNC: 20 U/L — SIGNIFICANT CHANGE UP (ref 4–33)
AMYLASE FLD-CCNC: 1996 U/L — SIGNIFICANT CHANGE UP
AMYLASE FLD-CCNC: 738 U/L — SIGNIFICANT CHANGE UP
ANION GAP SERPL CALC-SCNC: 11 MMOL/L — SIGNIFICANT CHANGE UP (ref 7–14)
AST SERPL-CCNC: 28 U/L — SIGNIFICANT CHANGE UP (ref 4–32)
BILIRUB SERPL-MCNC: 1 MG/DL — SIGNIFICANT CHANGE UP (ref 0.2–1.2)
BLOOD GAS ARTERIAL - LYTES,HGB,ICA,LACT RESULT: SIGNIFICANT CHANGE UP
BLOOD GAS ARTERIAL COMPREHENSIVE RESULT: SIGNIFICANT CHANGE UP
BUN SERPL-MCNC: 16 MG/DL — SIGNIFICANT CHANGE UP (ref 7–23)
CALCIUM SERPL-MCNC: 7.7 MG/DL — LOW (ref 8.4–10.5)
CHLORIDE SERPL-SCNC: 105 MMOL/L — SIGNIFICANT CHANGE UP (ref 98–107)
CO2 SERPL-SCNC: 22 MMOL/L — SIGNIFICANT CHANGE UP (ref 22–31)
CREAT SERPL-MCNC: 1.03 MG/DL — SIGNIFICANT CHANGE UP (ref 0.5–1.3)
EGFR: 58 ML/MIN/1.73M2 — LOW
GLUCOSE BLDC GLUCOMTR-MCNC: 118 MG/DL — HIGH (ref 70–99)
GLUCOSE BLDC GLUCOMTR-MCNC: 121 MG/DL — HIGH (ref 70–99)
GLUCOSE BLDC GLUCOMTR-MCNC: 127 MG/DL — HIGH (ref 70–99)
GLUCOSE BLDC GLUCOMTR-MCNC: 149 MG/DL — HIGH (ref 70–99)
GLUCOSE SERPL-MCNC: 132 MG/DL — HIGH (ref 70–99)
HCT VFR BLD CALC: 31.4 % — LOW (ref 34.5–45)
HGB BLD-MCNC: 10.6 G/DL — LOW (ref 11.5–15.5)
MAGNESIUM SERPL-MCNC: 1.8 MG/DL — SIGNIFICANT CHANGE UP (ref 1.6–2.6)
MCHC RBC-ENTMCNC: 29.8 PG — SIGNIFICANT CHANGE UP (ref 27–34)
MCHC RBC-ENTMCNC: 33.8 GM/DL — SIGNIFICANT CHANGE UP (ref 32–36)
MCV RBC AUTO: 88.2 FL — SIGNIFICANT CHANGE UP (ref 80–100)
NRBC # BLD: 0 /100 WBCS — SIGNIFICANT CHANGE UP (ref 0–0)
NRBC # FLD: 0 K/UL — SIGNIFICANT CHANGE UP (ref 0–0)
PHOSPHATE SERPL-MCNC: 3.8 MG/DL — SIGNIFICANT CHANGE UP (ref 2.5–4.5)
PLATELET # BLD AUTO: 180 K/UL — SIGNIFICANT CHANGE UP (ref 150–400)
POTASSIUM SERPL-MCNC: 3.6 MMOL/L — SIGNIFICANT CHANGE UP (ref 3.5–5.3)
POTASSIUM SERPL-SCNC: 3.6 MMOL/L — SIGNIFICANT CHANGE UP (ref 3.5–5.3)
PROT SERPL-MCNC: 4.7 G/DL — LOW (ref 6–8.3)
RBC # BLD: 3.56 M/UL — LOW (ref 3.8–5.2)
RBC # FLD: 12.8 % — SIGNIFICANT CHANGE UP (ref 10.3–14.5)
SODIUM SERPL-SCNC: 138 MMOL/L — SIGNIFICANT CHANGE UP (ref 135–145)
WBC # BLD: 12.62 K/UL — HIGH (ref 3.8–10.5)
WBC # FLD AUTO: 12.62 K/UL — HIGH (ref 3.8–10.5)

## 2022-09-24 PROCEDURE — 93010 ELECTROCARDIOGRAM REPORT: CPT

## 2022-09-24 PROCEDURE — 71045 X-RAY EXAM CHEST 1 VIEW: CPT | Mod: 26

## 2022-09-24 PROCEDURE — 99292 CRITICAL CARE ADDL 30 MIN: CPT | Mod: 25

## 2022-09-24 PROCEDURE — 31624 DX BRONCHOSCOPE/LAVAGE: CPT | Mod: GC

## 2022-09-24 PROCEDURE — 99291 CRITICAL CARE FIRST HOUR: CPT | Mod: 25

## 2022-09-24 RX ORDER — HYDROMORPHONE HYDROCHLORIDE 2 MG/ML
1 INJECTION INTRAMUSCULAR; INTRAVENOUS; SUBCUTANEOUS ONCE
Refills: 0 | Status: DISCONTINUED | OUTPATIENT
Start: 2022-09-24 | End: 2022-09-24

## 2022-09-24 RX ORDER — MAGNESIUM SULFATE 500 MG/ML
1 VIAL (ML) INJECTION ONCE
Refills: 0 | Status: COMPLETED | OUTPATIENT
Start: 2022-09-24 | End: 2022-09-24

## 2022-09-24 RX ORDER — HYDROMORPHONE HYDROCHLORIDE 2 MG/ML
0.5 INJECTION INTRAMUSCULAR; INTRAVENOUS; SUBCUTANEOUS EVERY 4 HOURS
Refills: 0 | Status: DISCONTINUED | OUTPATIENT
Start: 2022-09-24 | End: 2022-09-28

## 2022-09-24 RX ORDER — SODIUM CHLORIDE 9 MG/ML
500 INJECTION, SOLUTION INTRAVENOUS ONCE
Refills: 0 | Status: COMPLETED | OUTPATIENT
Start: 2022-09-24 | End: 2022-09-24

## 2022-09-24 RX ORDER — FUROSEMIDE 40 MG
40 TABLET ORAL ONCE
Refills: 0 | Status: COMPLETED | OUTPATIENT
Start: 2022-09-24 | End: 2022-09-24

## 2022-09-24 RX ORDER — HYDROMORPHONE HYDROCHLORIDE 2 MG/ML
1 INJECTION INTRAMUSCULAR; INTRAVENOUS; SUBCUTANEOUS EVERY 4 HOURS
Refills: 0 | Status: DISCONTINUED | OUTPATIENT
Start: 2022-09-24 | End: 2022-09-24

## 2022-09-24 RX ORDER — ALBUMIN HUMAN 25 %
250 VIAL (ML) INTRAVENOUS ONCE
Refills: 0 | Status: COMPLETED | OUTPATIENT
Start: 2022-09-24 | End: 2022-09-24

## 2022-09-24 RX ORDER — TETRACAINE/BENZOCAINE/BUTAMBEN 2%-14%-2%
1 OINTMENT (GRAM) TOPICAL ONCE
Refills: 0 | Status: DISCONTINUED | OUTPATIENT
Start: 2022-09-24 | End: 2022-09-25

## 2022-09-24 RX ORDER — POTASSIUM CHLORIDE 20 MEQ
20 PACKET (EA) ORAL
Refills: 0 | Status: COMPLETED | OUTPATIENT
Start: 2022-09-24 | End: 2022-09-24

## 2022-09-24 RX ORDER — HYDROMORPHONE HYDROCHLORIDE 2 MG/ML
0.5 INJECTION INTRAMUSCULAR; INTRAVENOUS; SUBCUTANEOUS EVERY 4 HOURS
Refills: 0 | Status: DISCONTINUED | OUTPATIENT
Start: 2022-09-24 | End: 2022-09-24

## 2022-09-24 RX ORDER — ACETAMINOPHEN 500 MG
1000 TABLET ORAL EVERY 6 HOURS
Refills: 0 | Status: COMPLETED | OUTPATIENT
Start: 2022-09-24 | End: 2022-09-25

## 2022-09-24 RX ORDER — HYDROMORPHONE HYDROCHLORIDE 2 MG/ML
0.25 INJECTION INTRAMUSCULAR; INTRAVENOUS; SUBCUTANEOUS EVERY 4 HOURS
Refills: 0 | Status: DISCONTINUED | OUTPATIENT
Start: 2022-09-24 | End: 2022-09-28

## 2022-09-24 RX ADMIN — HYDROMORPHONE HYDROCHLORIDE 1 MILLIGRAM(S): 2 INJECTION INTRAMUSCULAR; INTRAVENOUS; SUBCUTANEOUS at 17:45

## 2022-09-24 RX ADMIN — HEPARIN SODIUM 5000 UNIT(S): 5000 INJECTION INTRAVENOUS; SUBCUTANEOUS at 05:34

## 2022-09-24 RX ADMIN — Medication 5 MILLIGRAM(S): at 23:34

## 2022-09-24 RX ADMIN — Medication 125 MILLILITER(S): at 18:42

## 2022-09-24 RX ADMIN — SODIUM CHLORIDE 100 MILLILITER(S): 9 INJECTION, SOLUTION INTRAVENOUS at 21:23

## 2022-09-24 RX ADMIN — PANTOPRAZOLE SODIUM 40 MILLIGRAM(S): 20 TABLET, DELAYED RELEASE ORAL at 12:01

## 2022-09-24 RX ADMIN — SODIUM CHLORIDE 1000 MILLILITER(S): 9 INJECTION, SOLUTION INTRAVENOUS at 23:34

## 2022-09-24 RX ADMIN — Medication 100 MILLIGRAM(S): at 13:30

## 2022-09-24 RX ADMIN — Medication 400 MILLIGRAM(S): at 17:15

## 2022-09-24 RX ADMIN — Medication 100 GRAM(S): at 03:02

## 2022-09-24 RX ADMIN — INSULIN GLARGINE 12 UNIT(S): 100 INJECTION, SOLUTION SUBCUTANEOUS at 08:59

## 2022-09-24 RX ADMIN — HEPARIN SODIUM 5000 UNIT(S): 5000 INJECTION INTRAVENOUS; SUBCUTANEOUS at 21:23

## 2022-09-24 RX ADMIN — HYDROMORPHONE HYDROCHLORIDE 1 MILLIGRAM(S): 2 INJECTION INTRAMUSCULAR; INTRAVENOUS; SUBCUTANEOUS at 04:30

## 2022-09-24 RX ADMIN — HYDROMORPHONE HYDROCHLORIDE 1 MILLIGRAM(S): 2 INJECTION INTRAMUSCULAR; INTRAVENOUS; SUBCUTANEOUS at 13:45

## 2022-09-24 RX ADMIN — Medication 5 MILLIGRAM(S): at 06:22

## 2022-09-24 RX ADMIN — HYDROMORPHONE HYDROCHLORIDE 1 MILLIGRAM(S): 2 INJECTION INTRAMUSCULAR; INTRAVENOUS; SUBCUTANEOUS at 01:25

## 2022-09-24 RX ADMIN — Medication 50 MILLIEQUIVALENT(S): at 03:02

## 2022-09-24 RX ADMIN — Medication 40 MILLIGRAM(S): at 09:00

## 2022-09-24 RX ADMIN — Medication 400 MILLIGRAM(S): at 05:35

## 2022-09-24 RX ADMIN — HYDROMORPHONE HYDROCHLORIDE 1 MILLIGRAM(S): 2 INJECTION INTRAMUSCULAR; INTRAVENOUS; SUBCUTANEOUS at 01:45

## 2022-09-24 RX ADMIN — CHLORHEXIDINE GLUCONATE 15 MILLILITER(S): 213 SOLUTION TOPICAL at 05:34

## 2022-09-24 RX ADMIN — Medication 100 MILLIGRAM(S): at 05:35

## 2022-09-24 RX ADMIN — Medication 100 MILLIGRAM(S): at 21:23

## 2022-09-24 RX ADMIN — CHLORHEXIDINE GLUCONATE 1 APPLICATION(S): 213 SOLUTION TOPICAL at 05:37

## 2022-09-24 RX ADMIN — HYDROMORPHONE HYDROCHLORIDE 1 MILLIGRAM(S): 2 INJECTION INTRAMUSCULAR; INTRAVENOUS; SUBCUTANEOUS at 09:25

## 2022-09-24 RX ADMIN — HYDROMORPHONE HYDROCHLORIDE 1 MILLIGRAM(S): 2 INJECTION INTRAMUSCULAR; INTRAVENOUS; SUBCUTANEOUS at 04:23

## 2022-09-24 RX ADMIN — HEPARIN SODIUM 5000 UNIT(S): 5000 INJECTION INTRAVENOUS; SUBCUTANEOUS at 13:30

## 2022-09-24 RX ADMIN — HYDROMORPHONE HYDROCHLORIDE 0.25 MILLIGRAM(S): 2 INJECTION INTRAMUSCULAR; INTRAVENOUS; SUBCUTANEOUS at 21:22

## 2022-09-24 RX ADMIN — Medication 200 MILLIGRAM(S): at 05:34

## 2022-09-24 RX ADMIN — HYDROMORPHONE HYDROCHLORIDE 0.25 MILLIGRAM(S): 2 INJECTION INTRAMUSCULAR; INTRAVENOUS; SUBCUTANEOUS at 21:30

## 2022-09-24 RX ADMIN — HYDROMORPHONE HYDROCHLORIDE 1 MILLIGRAM(S): 2 INJECTION INTRAMUSCULAR; INTRAVENOUS; SUBCUTANEOUS at 09:40

## 2022-09-24 RX ADMIN — HYDROMORPHONE HYDROCHLORIDE 1 MILLIGRAM(S): 2 INJECTION INTRAMUSCULAR; INTRAVENOUS; SUBCUTANEOUS at 13:30

## 2022-09-24 RX ADMIN — HYDROMORPHONE HYDROCHLORIDE 1 MILLIGRAM(S): 2 INJECTION INTRAMUSCULAR; INTRAVENOUS; SUBCUTANEOUS at 17:15

## 2022-09-24 RX ADMIN — Medication 200 MILLIGRAM(S): at 17:16

## 2022-09-24 RX ADMIN — Medication 400 MILLIGRAM(S): at 11:59

## 2022-09-24 RX ADMIN — Medication 50 MILLIEQUIVALENT(S): at 05:34

## 2022-09-24 RX ADMIN — MIDAZOLAM HYDROCHLORIDE 2 MILLIGRAM(S): 1 INJECTION, SOLUTION INTRAMUSCULAR; INTRAVENOUS at 22:45

## 2022-09-24 NOTE — PROGRESS NOTE ADULT - SUBJECTIVE AND OBJECTIVE BOX
Patient overnight with worsening respiratory status, pocus showing hypovolemia requiring reintubation. CXR revealed white out left lung consistent with mucus plugging. Bronch performed and suctioned out significant clear secretions.  N mentating, started on propofol, intermittent versed pushes given  resp on 100% fio2 and 10 peep, po2 45  cv on phenylephrine likely vasoplegic from medications and sedation  gi npo, ivf, ngt  gu/renal adequate uop  heme vte ppx  id no changes  endo no changes    The patient is a critical care patient with life threatening hemodynamic and metabolic instability in SICU.  I have personally interviewed when possible and examined the patient, reviewed data and laboratory tests/x-rays and all pertinent electronic images.  I was physically present for the key portions of the evaluation and management (E/M) service provided.   The SICU team has a constant risk benefit analyzes discussion with the primary team, all consultants, House Staff and PA's on all decisions.  These diagnoses are unrelated to the surgical procedure noted above.  I meet with family if needed to get further history, discuss the case and make care decisions for this patient who might not be able to participate.  Time involved in performance of separately billable procedures was not counted toward my critical care time. There is no overlap.  I spent 55-75 minutes ( 0800Hrs-0915Hrs in AM/ 1600Hrs-1715Hrs in PM, or other time indicated) of critical care time for the diagnoses, assessment, plan and interventions.  This time excludes time spent on separate procedures and teaching.

## 2022-09-24 NOTE — PROGRESS NOTE ADULT - SUBJECTIVE AND OBJECTIVE BOX
POD # 2      Subjective: The patient is unable to verbalize- she could not be weaned from mechanical ventilation, but she indicated some incisional pain and some throat soreness.    Objective:   Vital Signs Last 24 Hrs  T(C): 37.9 (24 Sep 2022 12:00), Max: 38.4 (23 Sep 2022 18:00)  T(F): 100.3 (24 Sep 2022 12:00), Max: 101.1 (23 Sep 2022 18:00)  HR: 103 (24 Sep 2022 14:40) (69 - 104)  BP: --  BP(mean): --  RR: 24 (24 Sep 2022 12:00) (11 - 38)  SpO2: 98% (24 Sep 2022 14:40) (98% - 100%)    Parameters below as of 24 Sep 2022 14:40  Patient On (Oxygen Delivery Method): face tent  O2 Flow (L/min): 8  O2 Concentration (%): 40    Daily     Daily Weight in k.1 (24 Sep 2022 04:00)    Heent: N/C, AT PER no scleral icterus, No JVD  Cor: RRR  Abdomen: soft, non-distended, non-tender.. Wound - clean  Extremities: without edema, motor/sensory intact    I&O's Detail    23 Sep 2022 07:01  -  24 Sep 2022 07:00  --------------------------------------------------------  IN:    IV PiggyBack: 2333.1 mL    Lactated Ringers: 2400 mL  Total IN: 4733.1 mL    OUT:    Dexmedetomidine: 0 mL    Drain (mL): 155 mL    Drain (mL): 85 mL    Indwelling Catheter - Urethral (mL): 2179 mL    Nasogastric/Oral tube (mL): 450 mL    Phenylephrine: 0 mL  Total OUT: 2869 mL    Total NET: 1864.1 mL      24 Sep 2022 07:01  -  24 Sep 2022 15:12  --------------------------------------------------------  IN:    IV PiggyBack: 200 mL    Lactated Ringers: 700 mL  Total IN: 900 mL    OUT:    Drain (mL): 450 mL    Drain (mL): 40 mL    Indwelling Catheter - Urethral (mL): 790 mL  Total OUT: 1280 mL    Total NET: -380 mL          MEDICATIONS  (STANDING):  acetaminophen   IVPB .. 1000 milliGRAM(s) IV Intermittent every 6 hours  chlorhexidine 4% Liquid 1 Application(s) Topical <User Schedule>  ciprofloxacin   IVPB 400 milliGRAM(s) IV Intermittent every 12 hours  heparin   Injectable 5000 Unit(s) SubCutaneous every 8 hours  insulin glargine Injectable (LANTUS) 12 Unit(s) SubCutaneous every morning  insulin lispro (ADMELOG) corrective regimen sliding scale   SubCutaneous every 6 hours  lactated ringers. 1000 milliLiter(s) (100 mL/Hr) IV Continuous <Continuous>  metoprolol tartrate Injectable 5 milliGRAM(s) IV Push every 6 hours  metroNIDAZOLE  IVPB      metroNIDAZOLE  IVPB 500 milliGRAM(s) IV Intermittent every 8 hours  pantoprazole  Injectable 40 milliGRAM(s) IV Push daily    MEDICATIONS  (PRN):  HYDROmorphone  Injectable 1 milliGRAM(s) IV Push every 4 hours PRN Severe Pain (7 - 10)  HYDROmorphone  Injectable 0.5 milliGRAM(s) IV Push every 4 hours PRN Moderate Pain (4 - 6)  sodium chloride 0.9% lock flush 10 milliLiter(s) IV Push every 1 hour PRN Pre/post blood products, medications, blood draw, and to maintain line patency                            10.6   12.62 )-----------( 180      ( 24 Sep 2022 00:50 )             31.4     -    138  |  105  |  16  ----------------------------<  132<H>  3.6   |  22  |  1.03    Ca    7.7<L>      24 Sep 2022 00:50  Phos  3.8       Mg     1.80         TPro  4.7<L>  /  Alb  3.0<L>  /  TBili  1.0  /  DBili  x   /  AST  28  /  ALT  20  /  AlkPhos  45      PT/INR - ( 22 Sep 2022 23:00 )   PT: 15.8 sec;   INR: 1.36 ratio         PTT - ( 22 Sep 2022 23:00 )  PTT:28.9 sec    Radiologic Studies:

## 2022-09-24 NOTE — PROGRESS NOTE ADULT - SUBJECTIVE AND OBJECTIVE BOX
Anesthesia Pain Management Service    SUBJECTIVE: Patient is intubated as per primary RN, IV PCA orders on hold. Pain is controlled with IVP Dilaudid.    Pain Scale Score	At rest: ___ 	With Activity: ___ 	[X ] Refer to charted pain scores    THERAPY:    [ ] IV PCA Morphine		[ ] 5 mg/mL	[ ] 1 mg/mL  [X ] IV PCA Hydromorphone	[ ] 5 mg/mL	[X ] 1 mg/mL  [ ] IV PCA Fentanyl		[ ] 50 micrograms/mL    Demand dose __0.2_ lockout __6_ (minutes) Continuous Rate _0__ Total: ___  mg used (in past 24 hours)      MEDICATIONS  (STANDING):  acetaminophen   IVPB .. 1000 milliGRAM(s) IV Intermittent every 6 hours  chlorhexidine 0.12% Liquid 15 milliLiter(s) Oral Mucosa every 12 hours  chlorhexidine 4% Liquid 1 Application(s) Topical <User Schedule>  ciprofloxacin   IVPB 400 milliGRAM(s) IV Intermittent every 12 hours  furosemide   Injectable 40 milliGRAM(s) IV Push once  heparin   Injectable 5000 Unit(s) SubCutaneous every 8 hours  insulin glargine Injectable (LANTUS) 12 Unit(s) SubCutaneous every morning  insulin lispro (ADMELOG) corrective regimen sliding scale   SubCutaneous every 6 hours  lactated ringers. 1000 milliLiter(s) (100 mL/Hr) IV Continuous <Continuous>  metoprolol tartrate Injectable 5 milliGRAM(s) IV Push every 6 hours  metroNIDAZOLE  IVPB      metroNIDAZOLE  IVPB 500 milliGRAM(s) IV Intermittent every 8 hours  pantoprazole  Injectable 40 milliGRAM(s) IV Push daily    MEDICATIONS  (PRN):  HYDROmorphone  Injectable 1 milliGRAM(s) IV Push every 4 hours PRN Severe Pain (7 - 10)  HYDROmorphone  Injectable 0.5 milliGRAM(s) IV Push every 4 hours PRN Moderate Pain (4 - 6)  sodium chloride 0.9% lock flush 10 milliLiter(s) IV Push every 1 hour PRN Pre/post blood products, medications, blood draw, and to maintain line patency      OBJECTIVE: Patient laying in bed, intubated.    Sedation Score:	[ X] Alert	[ ] Drowsy 	[ ] Arousable	[ ] Asleep	[ ] Unresponsive    Side Effects:	[X ] None	[ ] Nausea	[ ] Vomiting	[ ] Pruritus  		[ ] Other:    Vital Signs Last 24 Hrs  T(C): 37.2 (24 Sep 2022 08:00), Max: 38.4 (23 Sep 2022 18:00)  T(F): 98.9 (24 Sep 2022 08:00), Max: 101.1 (23 Sep 2022 18:00)  HR: 84 (24 Sep 2022 08:05) (69 - 105)  BP: --  BP(mean): --  RR: 23 (24 Sep 2022 08:00) (11 - 38)  SpO2: 100% (24 Sep 2022 08:05) (100% - 100%)    Parameters below as of 24 Sep 2022 08:00  Patient On (Oxygen Delivery Method): ventilator,CPAP 10/5    O2 Concentration (%): 40    ASSESSMENT/ PLAN    Therapy to  be:	[ X] Continue   [ ] Discontinued   [ ] Change to prn Analgesics    Documentation and Verification of current medications:   [X] Done	[ ] Not done, not elligible    Comments: Recommend non-opioid adjuvant analgesics to be used when possible and when allowed by primary surgical team.    Progress Note written now but Patient was seen earlier.

## 2022-09-24 NOTE — PROGRESS NOTE ADULT - ASSESSMENT
INTERVAL EVENTS  - CPAP trials yesterday, failed  - lasix 20  - weaned off sedation and pressors   - Lopressor 5 mg q6  - Hyperglycemic to 250s --> lantus 12, mod ISS  - Dilaudid .5, .25x2 and 1 mg IV push overnight  - Febrile 101.1 yesterday 6pm      71 y/o female [place] Creole speaking presented with diagnosis of pancreatic cancer on endoscopy biopsy. Subsequent CT scan and PET scan confirm pathology. Patient is now s/p exploratory laparotomy with Whipple procedure, and placement of drains R posterior and L anterior to the anastomoses.    PLAN  NEUROLOGIC   - Pain control: Tylenol  - Off fentanyl, on Dilaudid IV Push PRN  - PCA when patient is extubated    RESPIRATORY   - Monitor SpO2 goal >92%  - Incentive spirometry   - CPAP trials, extubate if tolerating  - AC 10/350/5/40    CARDIOVASCULAR   - Monitor hemodynamics   - Off pressors    GASTROINTESTINAL   - Diet: NPO  - Monitor bowel function  - Monitor drain output  - Drain amylases  - NOT on Whipple pathway    /RENAL   - IV fluids: LR @ 100 cc/hr  - Strict I/Os  - Monitor BMP qd  - Maintain hernandez catheter, strict Is/Os  - Monitor electrolytes, replete PRN    HEMATOLOGIC  - Monitor H/H   - DVT ppx SQH    INFECTIOUS DISEASE  - Monitor fever / WBC  - Febrile 101.1 6pm  - C/W Cipro and Flagyl x7d per primary team    ENDOCRINE  - Monitor gluc    LINES  - A line L radial  - Hernandez  - PIV     DISPO: SICU   71 y/o female [place] Creole speaking presented with diagnosis of pancreatic cancer on endoscopy biopsy. Subsequent CT scan and PET scan confirm pathology. Patient is now s/p exploratory laparotomy with Whipple procedure, and placement of drains R posterior and L anterior to the anastomoses.    PLAN  NEUROLOGIC   - Pain control: Tylenol  - Off fentanyl, on Dilaudid IV Push PRN      RESPIRATORY   - Monitor SpO2 goal >92%  - Incentive spirometry   - CPAP trials, extubate if tolerating  - AC 10/350/5/40    CARDIOVASCULAR   - Monitor hemodynamics   - Off pressors    GASTROINTESTINAL   - Diet: NPO  - Monitor bowel function  - Monitor drain output  - Drain amylases  - NOT on Whipple pathway    /RENAL   - IV fluids: LR @ 100 cc/hr  - Strict I/Os  - Monitor BMP qd  - Maintain hernandez catheter, strict Is/Os  - Monitor electrolytes, replete PRN    HEMATOLOGIC  - Monitor H/H   - DVT ppx SQH    INFECTIOUS DISEASE  - Monitor fever / WBC  - C/W Cipro and Flagyl x7d per primary team    ENDOCRINE  - Monitor gluc    LINES  - A line L radial  - Hernandez  - PIV     DISPO: SICU

## 2022-09-24 NOTE — PROGRESS NOTE ADULT - SUBJECTIVE AND OBJECTIVE BOX
COLORECTAL SURGERY PROGRESS NOTE    POD #1 s/p exploratory laparotomy with Whipple procedure, and placement of drains R posterior and L anterior to the anastomoses.    INTERVAL EVENTS  - No acute events overnight    Allergies: penicillin (Other)    PAST MEDICAL & SURGICAL HISTORY:  Language barrier  native language Georgian Creole; comprehends and verbzlizes minimal English  Type 2 diabetes mellitus  Anxiety and depression  Hypertension  Pancreatic cancer September 2022  Uterine fibroid hysterectomy 1988  H/O jaundice biliary stent removed in the OR    MEDICATIONS  (STANDING):  acetaminophen   IVPB .. 1000 milliGRAM(s) IV Intermittent every 6 hours  chlorhexidine 0.12% Liquid 15 milliLiter(s) Oral Mucosa every 12 hours  chlorhexidine 4% Liquid 1 Application(s) Topical <User Schedule>  ciprofloxacin   IVPB 400 milliGRAM(s) IV Intermittent every 12 hours  heparin   Injectable 5000 Unit(s) SubCutaneous every 8 hours  insulin glargine Injectable (LANTUS) 12 Unit(s) SubCutaneous every morning  insulin lispro (ADMELOG) corrective regimen sliding scale   SubCutaneous every 6 hours  lactated ringers. 1000 milliLiter(s) (100 mL/Hr) IV Continuous <Continuous>  metoprolol tartrate Injectable 5 milliGRAM(s) IV Push every 6 hours  metroNIDAZOLE  IVPB      metroNIDAZOLE  IVPB 500 milliGRAM(s) IV Intermittent every 8 hours  pantoprazole  Injectable 40 milliGRAM(s) IV Push daily    MEDICATIONS  (PRN):  HYDROmorphone  Injectable 0.5 milliGRAM(s) IV Push every 4 hours PRN Moderate Pain (4 - 6)  HYDROmorphone  Injectable 1 milliGRAM(s) IV Push every 4 hours PRN Severe Pain (7 - 10)  sodium chloride 0.9% lock flush 10 milliLiter(s) IV Push every 1 hour PRN Pre/post blood products, medications, blood draw, and to maintain line patency    --------------------------------------------------------------------------------------    VITAL SIGNS, INS/OUTS (last 24 hours):    T(C): 36.9 (09-22-22 @ 10:28), Max: 36.9 (09-22-22 @ 10:28)  HR: 73 (09-22-22 @ 10:28) (73 - 73)  BP: 151/72 (09-22-22 @ 10:28) (151/72 - 151/72)  ABP: --  ABP(mean): --  RR: 14 (09-22-22 @ 10:28) (14 - 14)  SpO2: 98% (09-22-22 @ 10:28) (98% - 98%)  --------------------------------------------------------------------------------------    EXAM  Gen: Intubated, sedated  CV: RRR, S1, S2  Pulm: Normal chest wall expansion on mechanical ventilation AC/CMV mode   Abd: soft, incisions ATTP w/ dressings c/d/i, serosanguinous MARGRET drainage  Ext: Peripheral pulses intact    ---------------------------------------------------------------------------------------  LAB VALUES                        11.6   10.17 )-----------( 206      ( 23 Sep 2022 05:30 )             34.3     09-23    135  |  101  |  18  ----------------------------<  173<H>  4.0   |  19<L>  |  0.89    Ca    8.3<L>      23 Sep 2022 05:30  Phos  2.3     09-23  Mg     2.10     09-23      PT/INR - ( 22 Sep 2022 23:00 )   PT: 15.8 sec;   INR: 1.36 ratio       PTT - ( 22 Sep 2022 23:00 )  PTT:28.9 sec    CAPILLARY BLOOD GLUCOSE  POCT Blood Glucose.: 182 mg/dL (23 Sep 2022 05:21)  POCT Blood Glucose.: 270 mg/dL (23 Sep 2022 02:25)  POCT Blood Glucose.: 250 mg/dL (23 Sep 2022 01:20)      A/P: 72F w/ diagnosis of pancreatic cancer on endoscopy, POD #1 s/p exploratory laparotomy with Whipple procedure, and placement of drains R posterior and L anterior to the anastomoses.  - NPO/IVF hydration  - IV abx: Cipro/Flagyl  - PRN analgesia  - PCA when patient extubated  - Wean sedation/vent per SICU  - Continue hernandez  - Monitor I&Os  - Monitor MARGRET drain output  - Monitor bowel function  - f/u AM labs  - f/u drain amylase  - DVT ppx  - Appreciate excellent care per SICU  - will d/w Dr. Isreal EDOUARD Team Surgery  [id number]   COLORECTAL SURGERY PROGRESS NOTE    POD #1 s/p exploratory laparotomy with Whipple procedure, and placement of drains R posterior and L anterior to the anastomoses.    INTERVAL EVENTS  - No acute events overnight  - Failed CPAP trial, still pending extubation  - Remains intubated, responsive to commands    Allergies: penicillin (Other)    PAST MEDICAL & SURGICAL HISTORY:  Language barrier  native language [place] Creole; comprehends and verbzlizes minimal English  Type 2 diabetes mellitus  Anxiety and depression  Hypertension  Pancreatic cancer September 2022  Uterine fibroid hysterectomy 1988  H/O jaundice biliary stent removed in the OR    MEDICATIONS  (STANDING):  acetaminophen   IVPB .. 1000 milliGRAM(s) IV Intermittent every 6 hours  chlorhexidine 0.12% Liquid 15 milliLiter(s) Oral Mucosa every 12 hours  chlorhexidine 4% Liquid 1 Application(s) Topical <User Schedule>  ciprofloxacin   IVPB 400 milliGRAM(s) IV Intermittent every 12 hours  heparin   Injectable 5000 Unit(s) SubCutaneous every 8 hours  insulin glargine Injectable (LANTUS) 12 Unit(s) SubCutaneous every morning  insulin lispro (ADMELOG) corrective regimen sliding scale   SubCutaneous every 6 hours  lactated ringers. 1000 milliLiter(s) (100 mL/Hr) IV Continuous <Continuous>  metoprolol tartrate Injectable 5 milliGRAM(s) IV Push every 6 hours  metroNIDAZOLE  IVPB      metroNIDAZOLE  IVPB 500 milliGRAM(s) IV Intermittent every 8 hours  pantoprazole  Injectable 40 milliGRAM(s) IV Push daily    MEDICATIONS  (PRN):  HYDROmorphone  Injectable 0.5 milliGRAM(s) IV Push every 4 hours PRN Moderate Pain (4 - 6)  HYDROmorphone  Injectable 1 milliGRAM(s) IV Push every 4 hours PRN Severe Pain (7 - 10)  sodium chloride 0.9% lock flush 10 milliLiter(s) IV Push every 1 hour PRN Pre/post blood products, medications, blood draw, and to maintain line patency    --------------------------------------------------------------------------------------  Vital Signs Last 24 Hrs  T(C): 37.9 (24 Sep 2022 12:00), Max: 38.4 (23 Sep 2022 18:00)  T(F): 100.3 (24 Sep 2022 12:00), Max: 101.1 (23 Sep 2022 18:00)  HR: 84 (24 Sep 2022 14:00) (69 - 104)  BP: --  BP(mean): --  RR: 24 (24 Sep 2022 12:00) (11 - 38)  SpO2: 100% (24 Sep 2022 14:00) (100% - 100%)    Parameters below as of 24 Sep 2022 13:00  Patient On (Oxygen Delivery Method): ventilator,CPAP 5/5      I&O's Detail    23 Sep 2022 07:01  -  24 Sep 2022 07:00  --------------------------------------------------------  IN:    IV PiggyBack: 2333.1 mL    Lactated Ringers: 2400 mL  Total IN: 4733.1 mL    OUT:    Dexmedetomidine: 0 mL    Drain (mL): 155 mL    Drain (mL): 85 mL    Indwelling Catheter - Urethral (mL): 2179 mL    Nasogastric/Oral tube (mL): 450 mL    Phenylephrine: 0 mL  Total OUT: 2869 mL    Total NET: 1864.1 mL      24 Sep 2022 07:01  -  24 Sep 2022 14:31  --------------------------------------------------------  IN:    IV PiggyBack: 200 mL    Lactated Ringers: 700 mL  Total IN: 900 mL    OUT:    Drain (mL): 450 mL    Drain (mL): 40 mL    Indwelling Catheter - Urethral (mL): 790 mL  Total OUT: 1280 mL    Total NET: -380 mL    --------------------------------------------------------------------------------------    EXAM  Gen: Intubated, sedated  CV: RRR, S1, S2  Pulm: Normal chest wall expansion on mechanical ventilation AC/CMV mode   Abd: soft, incisions ATTP w/ dressings c/d/i, serosanguinous MARGRET drainage  Ext: Peripheral pulses intact    ---------------------------------------------------------------------------------------  LAB VALUES                        10.6   12.62 )-----------( 180      ( 24 Sep 2022 00:50 )             31.4     09-24    138  |  105  |  16  ----------------------------<  132<H>  3.6   |  22  |  1.03    Ca    7.7<L>      24 Sep 2022 00:50  Phos  3.8     09-24  Mg     1.80     09-24    TPro  4.7<L>  /  Alb  3.0<L>  /  TBili  1.0  /  DBili  x   /  AST  28  /  ALT  20  /  AlkPhos  45  09-24    PT/INR - ( 22 Sep 2022 23:00 )   PT: 15.8 sec;   INR: 1.36 ratio         PTT - ( 22 Sep 2022 23:00 )  PTT:28.9 sec  CAPILLARY BLOOD GLUCOSE      POCT Blood Glucose.: 149 mg/dL (24 Sep 2022 12:00)  POCT Blood Glucose.: 127 mg/dL (24 Sep 2022 08:59)  POCT Blood Glucose.: 118 mg/dL (24 Sep 2022 06:26)  POCT Blood Glucose.: 114 mg/dL (23 Sep 2022 23:47)  POCT Blood Glucose.: 123 mg/dL (23 Sep 2022 17:57)        A/P: 72F w/ diagnosis of pancreatic cancer on endoscopy, POD #1 s/p exploratory laparotomy with Whipple procedure, and placement of drains R posterior and L anterior to the anastomoses. Drain amylase downtrending.  - NPO/IVF hydration  - IV abx: Cipro/Flagyl  - PRN analgesia  - PCA when patient extubated  - Wean sedation/vent per SICU  - Continue hernandez  - Monitor I&Os  - Monitor MARGRET drain output  - Monitor bowel function  - f/u AM labs  - f/u drain amylase  - DVT ppx  - Appreciate excellent care per SICU  - will d/w Dr. Isreal EDOUARD Team Surgery  [id number]

## 2022-09-24 NOTE — PROGRESS NOTE ADULT - ASSESSMENT
S/P Whipple's procedure for head of the pancreas malignancy.  Still on respirator  NGT output is bilious  T. bilirubin is 1.  Rod drain output is much less bloody than yesterday.

## 2022-09-24 NOTE — PROGRESS NOTE ADULT - SUBJECTIVE AND OBJECTIVE BOX
Surgery Information  Exploratory laparotomy, Whipple  Case Duration:  8 hours    EBL:  1100     IV Fluids: 5L   Blood Products: 1pRBC           HISTORY    73 y/o female [place] Creole speaking presented with diagnosis of pancreatic cancer on endoscopy biopsy. Subsequent CT scan and PET scan confirm pathology. Patient is now s/p exploratory laparotomy with Whipple procedure, and placement of drains R posterior and L anterior to the anastomoses.    INTERVAL EVENTS  - No acute events overnight    Allergies: penicillin (Other)    PAST MEDICAL & SURGICAL HISTORY:  Type 2 diabetes mellitus  Anxiety and depression  Hypertension  Pancreatic cancer September 2022  Uterine fibroid hysterectomy 1988  H/O jaundice biliary stent removed in the OR    FAMILY HISTORY:  No pertinent family history in first degree relatives    HISTORY  72y Female    24 HOUR EVENTS:    SUBJECTIVE/ROS:  [ ] A ten-point review of systems was otherwise negative except as noted.  [ ] Due to altered mental status/intubation, subjective information were not able to be obtained from the patient. History was obtained, to the extent possible, from review of the chart and collateral sources of information.      NEURO  Exam: awake, intubated  Meds: acetaminophen   IVPB .. 1000 milliGRAM(s) IV Intermittent every 6 hours  HYDROmorphone  Injectable 0.3 milliGRAM(s) IV Push every 4 hours PRN Moderate to Severe Pain (4 - 10)  HYDROmorphone  Injectable 0.25 milliGRAM(s) IV Push every 4 hours PRN Severe breakthrough  Pain (7 - 10)    [x] Adequacy of sedation and pain control has been assessed and adjusted      RESPIRATORY  RR: 31 (09-24-22 @ 01:00) (11 - 31)  SpO2: 100% (09-24-22 @ 01:00) (100% - 100%)  Wt(kg): --  Exam: unlabored, clear to auscultation bilaterally  Mechanical Ventilation: Mode: AC/ CMV (Assist Control/ Continuous Mandatory Ventilation), RR (machine): 10, RR (patient): 32, TV (machine): 350, FiO2: 40, PEEP: 5, ITime: 0.9, MAP: 10, PIP: 30  ABG - ( 24 Sep 2022 00:50 )  pH: 7.47  /  pCO2: 33    /  pO2: 175   / HCO3: 24    / Base Excess: 0.7   /  SaO2: 100.0   Lactate: x          [N/A] Extubation Readiness Assessed  Meds:       CARDIOVASCULAR  HR: 77 (09-24-22 @ 01:00) (64 - 115)  BP: 98/44 (09-23-22 @ 04:00) (98/44 - 148/76)  BP(mean): 59 (09-23-22 @ 04:00) (59 - 95)  ABP: 139/60 (09-24-22 @ 01:00) (91/40 - 165/66)  ABP(mean): 86 (09-24-22 @ 01:00) (54 - 97)  Wt(kg): --  CVP(cm H2O): --      Exam: regular rate and rhythm  Cardiac Rhythm: sinus  Perfusion     [x]Adequate   [ ]Inadequate  Mentation   [x]Normal       [ ]Reduced  Extremities  [x]Warm         [ ]Cool  Volume Status [ ]Hypervolemic [x]Euvolemic [ ]Hypovolemic  Meds: metoprolol tartrate Injectable 5 milliGRAM(s) IV Push every 6 hours        GI/NUTRITION  Exam: Abdomen soft, appropriate incisional tenderness. Drains in place.  Diet: NPO  Meds: pantoprazole  Injectable 40 milliGRAM(s) IV Push daily      GENITOURINARY  I&O's Detail    09-22 @ 07:01  -  09-23 @ 07:00  --------------------------------------------------------  IN:    Dexmedetomidine: 18.8 mL    FentaNYL: 22.5 mL    Lactated Ringers: 900 mL    Lactated Ringers Bolus: 500 mL  Total IN: 1441.3 mL    OUT:    Drain (mL): 40 mL    Drain (mL): 40 mL    Nasogastric/Oral tube (mL): 150 mL    Voided (mL): 535 mL  Total OUT: 765 mL    Total NET: 676.3 mL      09-23 @ 07:01  -  09-24 @ 01:16  --------------------------------------------------------  IN:    IV PiggyBack: 1733.1 mL    Lactated Ringers: 1900 mL  Total IN: 3633.1 mL    OUT:    Dexmedetomidine: 0 mL    Drain (mL): 75 mL    Drain (mL): 60 mL    Indwelling Catheter - Urethral (mL): 1917 mL    Nasogastric/Oral tube (mL): 450 mL    Phenylephrine: 0 mL  Total OUT: 2502 mL    Total NET: 1131.1 mL          09-23    135  |  101  |  18  ----------------------------<  173<H>  4.0   |  19<L>  |  0.89    Ca    8.3<L>      23 Sep 2022 05:30  Phos  2.3     09-23  Mg     2.10     09-23      [ ] Melgar catheter, indication: N/A  Meds: lactated ringers. 1000 milliLiter(s) IV Continuous <Continuous>  sodium chloride 0.9% lock flush 10 milliLiter(s) IV Push every 1 hour PRN Pre/post blood products, medications, blood draw, and to maintain line patency        HEMATOLOGIC  Meds: heparin   Injectable 5000 Unit(s) SubCutaneous every 8 hours    [x] VTE Prophylaxis                        10.6   12.62 )-----------( 180      ( 24 Sep 2022 00:50 )             31.4     PT/INR - ( 22 Sep 2022 23:00 )   PT: 15.8 sec;   INR: 1.36 ratio         PTT - ( 22 Sep 2022 23:00 )  PTT:28.9 sec  Transfusion     [ ] PRBC   [ ] Platelets   [ ] FFP   [ ] Cryoprecipitate      INFECTIOUS DISEASES  WBC Count: 12.62 K/uL (09-24 @ 00:50)  WBC Count: 10.17 K/uL (09-23 @ 05:30)    RECENT CULTURES:    Meds: ciprofloxacin   IVPB 400 milliGRAM(s) IV Intermittent every 12 hours  metroNIDAZOLE  IVPB      metroNIDAZOLE  IVPB 500 milliGRAM(s) IV Intermittent every 8 hours        ENDOCRINE  CAPILLARY BLOOD GLUCOSE      POCT Blood Glucose.: 114 mg/dL (23 Sep 2022 23:47)  POCT Blood Glucose.: 123 mg/dL (23 Sep 2022 17:57)  POCT Blood Glucose.: 153 mg/dL (23 Sep 2022 11:31)  POCT Blood Glucose.: 182 mg/dL (23 Sep 2022 05:21)  POCT Blood Glucose.: 270 mg/dL (23 Sep 2022 02:25)  POCT Blood Glucose.: 250 mg/dL (23 Sep 2022 01:20)    Meds: insulin glargine Injectable (LANTUS) 12 Unit(s) SubCutaneous every morning  insulin lispro (ADMELOG) corrective regimen sliding scale   SubCutaneous every 6 hours        ACCESS DEVICES:  [ ] Peripheral IV  [ ] Central Venous Line	[ ] R	[ ] L	[ ] IJ	[ ] Fem	[ ] SC	Placed:   [ ] Arterial Line		[ ] R	[ ] L	[ ] Fem	[ ] Rad	[ ] Ax	Placed:   [ ] PICC:					[ ] Mediport  [ ] Urinary Catheter, Date Placed:   [x] Necessity of urinary, arterial, and venous catheters discussed    OTHER MEDICATIONS:  chlorhexidine 0.12% Liquid 15 milliLiter(s) Oral Mucosa every 12 hours  chlorhexidine 4% Liquid 1 Application(s) Topical <User Schedule>   INTERVAL EVENTS  - CPAP trials yesterday, failed  - lasix 20  - weaned off sedation and pressors   - Lopressor 5 mg q6  - Hyperglycemic to 250s --> lantus 12, mod ISS  - Dilaudid .5, .25x2 and 1 mg IV push overnight  - Febrile 101.1 yesterday 6pm      Surgery Information  Exploratory laparotomy, Whipple  Case Duration:  8 hours    EBL:  1100     IV Fluids: 5L   Blood Products: 1pRBC           HISTORY    71 y/o female [place] Creole speaking presented with diagnosis of pancreatic cancer on endoscopy biopsy. Subsequent CT scan and PET scan confirm pathology. Patient is now s/p exploratory laparotomy with Whipple procedure, and placement of drains R posterior and L anterior to the anastomoses.    Allergies: penicillin (Other)    PAST MEDICAL & SURGICAL HISTORY:  Type 2 diabetes mellitus  Anxiety and depression  Hypertension  Pancreatic cancer September 2022  Uterine fibroid hysterectomy 1988  H/O jaundice biliary stent removed in the OR    FAMILY HISTORY:  No pertinent family history in first degree relatives    HISTORY  72y Female    24 HOUR EVENTS:    SUBJECTIVE/ROS:  [ ] A ten-point review of systems was otherwise negative except as noted.  [ ] Due to altered mental status/intubation, subjective information were not able to be obtained from the patient. History was obtained, to the extent possible, from review of the chart and collateral sources of information.      NEURO  Exam: awake, intubated  Meds: acetaminophen   IVPB .. 1000 milliGRAM(s) IV Intermittent every 6 hours  HYDROmorphone  Injectable 0.3 milliGRAM(s) IV Push every 4 hours PRN Moderate to Severe Pain (4 - 10)  HYDROmorphone  Injectable 0.25 milliGRAM(s) IV Push every 4 hours PRN Severe breakthrough  Pain (7 - 10)    [x] Adequacy of sedation and pain control has been assessed and adjusted      RESPIRATORY  RR: 31 (09-24-22 @ 01:00) (11 - 31)  SpO2: 100% (09-24-22 @ 01:00) (100% - 100%)  Wt(kg): --  Exam: unlabored, clear to auscultation bilaterally  Mechanical Ventilation: Mode: AC/ CMV (Assist Control/ Continuous Mandatory Ventilation), RR (machine): 10, RR (patient): 32, TV (machine): 350, FiO2: 40, PEEP: 5, ITime: 0.9, MAP: 10, PIP: 30  ABG - ( 24 Sep 2022 00:50 )  pH: 7.47  /  pCO2: 33    /  pO2: 175   / HCO3: 24    / Base Excess: 0.7   /  SaO2: 100.0   Lactate: x          [N/A] Extubation Readiness Assessed  Meds:       CARDIOVASCULAR  HR: 77 (09-24-22 @ 01:00) (64 - 115)  BP: 98/44 (09-23-22 @ 04:00) (98/44 - 148/76)  BP(mean): 59 (09-23-22 @ 04:00) (59 - 95)  ABP: 139/60 (09-24-22 @ 01:00) (91/40 - 165/66)  ABP(mean): 86 (09-24-22 @ 01:00) (54 - 97)  Wt(kg): --  CVP(cm H2O): --      Exam: regular rate and rhythm  Cardiac Rhythm: sinus  Perfusion     [x]Adequate   [ ]Inadequate  Mentation   [x]Normal       [ ]Reduced  Extremities  [x]Warm         [ ]Cool  Volume Status [ ]Hypervolemic [x]Euvolemic [ ]Hypovolemic  Meds: metoprolol tartrate Injectable 5 milliGRAM(s) IV Push every 6 hours        GI/NUTRITION  Exam: Abdomen soft, appropriate incisional tenderness. Drains in place.  Diet: NPO  Meds: pantoprazole  Injectable 40 milliGRAM(s) IV Push daily      GENITOURINARY  I&O's Detail    09-22 @ 07:01  -  09-23 @ 07:00  --------------------------------------------------------  IN:    Dexmedetomidine: 18.8 mL    FentaNYL: 22.5 mL    Lactated Ringers: 900 mL    Lactated Ringers Bolus: 500 mL  Total IN: 1441.3 mL    OUT:    Drain (mL): 40 mL    Drain (mL): 40 mL    Nasogastric/Oral tube (mL): 150 mL    Voided (mL): 535 mL  Total OUT: 765 mL    Total NET: 676.3 mL      09-23 @ 07:01  -  09-24 @ 01:16  --------------------------------------------------------  IN:    IV PiggyBack: 1733.1 mL    Lactated Ringers: 1900 mL  Total IN: 3633.1 mL    OUT:    Dexmedetomidine: 0 mL    Drain (mL): 75 mL    Drain (mL): 60 mL    Indwelling Catheter - Urethral (mL): 1917 mL    Nasogastric/Oral tube (mL): 450 mL    Phenylephrine: 0 mL  Total OUT: 2502 mL    Total NET: 1131.1 mL          09-23    135  |  101  |  18  ----------------------------<  173<H>  4.0   |  19<L>  |  0.89    Ca    8.3<L>      23 Sep 2022 05:30  Phos  2.3     09-23  Mg     2.10     09-23      [ ] Melgar catheter, indication: N/A  Meds: lactated ringers. 1000 milliLiter(s) IV Continuous <Continuous>  sodium chloride 0.9% lock flush 10 milliLiter(s) IV Push every 1 hour PRN Pre/post blood products, medications, blood draw, and to maintain line patency        HEMATOLOGIC  Meds: heparin   Injectable 5000 Unit(s) SubCutaneous every 8 hours    [x] VTE Prophylaxis                        10.6   12.62 )-----------( 180      ( 24 Sep 2022 00:50 )             31.4     PT/INR - ( 22 Sep 2022 23:00 )   PT: 15.8 sec;   INR: 1.36 ratio         PTT - ( 22 Sep 2022 23:00 )  PTT:28.9 sec  Transfusion     [ ] PRBC   [ ] Platelets   [ ] FFP   [ ] Cryoprecipitate      INFECTIOUS DISEASES  WBC Count: 12.62 K/uL (09-24 @ 00:50)  WBC Count: 10.17 K/uL (09-23 @ 05:30)    RECENT CULTURES:    Meds: ciprofloxacin   IVPB 400 milliGRAM(s) IV Intermittent every 12 hours  metroNIDAZOLE  IVPB      metroNIDAZOLE  IVPB 500 milliGRAM(s) IV Intermittent every 8 hours        ENDOCRINE  CAPILLARY BLOOD GLUCOSE      POCT Blood Glucose.: 114 mg/dL (23 Sep 2022 23:47)  POCT Blood Glucose.: 123 mg/dL (23 Sep 2022 17:57)  POCT Blood Glucose.: 153 mg/dL (23 Sep 2022 11:31)  POCT Blood Glucose.: 182 mg/dL (23 Sep 2022 05:21)  POCT Blood Glucose.: 270 mg/dL (23 Sep 2022 02:25)  POCT Blood Glucose.: 250 mg/dL (23 Sep 2022 01:20)    Meds: insulin glargine Injectable (LANTUS) 12 Unit(s) SubCutaneous every morning  insulin lispro (ADMELOG) corrective regimen sliding scale   SubCutaneous every 6 hours        ACCESS DEVICES:  [ ] Peripheral IV  [ ] Central Venous Line	[ ] R	[ ] L	[ ] IJ	[ ] Fem	[ ] SC	Placed:   [ ] Arterial Line		[ ] R	[ ] L	[ ] Fem	[ ] Rad	[ ] Ax	Placed:   [ ] PICC:					[ ] Mediport  [ ] Urinary Catheter, Date Placed:   [x] Necessity of urinary, arterial, and venous catheters discussed    OTHER MEDICATIONS:  chlorhexidine 0.12% Liquid 15 milliLiter(s) Oral Mucosa every 12 hours  chlorhexidine 4% Liquid 1 Application(s) Topical <User Schedule>

## 2022-09-25 LAB
ALBUMIN SERPL ELPH-MCNC: 2.5 G/DL — LOW (ref 3.3–5)
ALP SERPL-CCNC: 48 U/L — SIGNIFICANT CHANGE UP (ref 40–120)
ALT FLD-CCNC: 16 U/L — SIGNIFICANT CHANGE UP (ref 4–33)
AMYLASE FLD-CCNC: 1629 U/L — SIGNIFICANT CHANGE UP
AMYLASE FLD-CCNC: 665 U/L — SIGNIFICANT CHANGE UP
ANION GAP SERPL CALC-SCNC: 9 MMOL/L — SIGNIFICANT CHANGE UP (ref 7–14)
APTT BLD: 40.1 SEC — HIGH (ref 27–36.3)
AST SERPL-CCNC: 27 U/L — SIGNIFICANT CHANGE UP (ref 4–32)
BILIRUB SERPL-MCNC: 0.8 MG/DL — SIGNIFICANT CHANGE UP (ref 0.2–1.2)
BLOOD GAS ARTERIAL - LYTES,HGB,ICA,LACT RESULT: SIGNIFICANT CHANGE UP
BUN SERPL-MCNC: 13 MG/DL — SIGNIFICANT CHANGE UP (ref 7–23)
CALCIUM SERPL-MCNC: 7.4 MG/DL — LOW (ref 8.4–10.5)
CHLORIDE SERPL-SCNC: 104 MMOL/L — SIGNIFICANT CHANGE UP (ref 98–107)
CO2 SERPL-SCNC: 23 MMOL/L — SIGNIFICANT CHANGE UP (ref 22–31)
CREAT SERPL-MCNC: 0.84 MG/DL — SIGNIFICANT CHANGE UP (ref 0.5–1.3)
EGFR: 74 ML/MIN/1.73M2 — SIGNIFICANT CHANGE UP
GLUCOSE BLDC GLUCOMTR-MCNC: 109 MG/DL — HIGH (ref 70–99)
GLUCOSE BLDC GLUCOMTR-MCNC: 121 MG/DL — HIGH (ref 70–99)
GLUCOSE BLDC GLUCOMTR-MCNC: 144 MG/DL — HIGH (ref 70–99)
GLUCOSE BLDC GLUCOMTR-MCNC: 76 MG/DL — SIGNIFICANT CHANGE UP (ref 70–99)
GLUCOSE BLDC GLUCOMTR-MCNC: 94 MG/DL — SIGNIFICANT CHANGE UP (ref 70–99)
GLUCOSE BLDC GLUCOMTR-MCNC: 96 MG/DL — SIGNIFICANT CHANGE UP (ref 70–99)
GLUCOSE SERPL-MCNC: 122 MG/DL — HIGH (ref 70–99)
HCT VFR BLD CALC: 33.5 % — LOW (ref 34.5–45)
HGB BLD-MCNC: 10.7 G/DL — LOW (ref 11.5–15.5)
INR BLD: 1.5 RATIO — HIGH (ref 0.88–1.16)
MAGNESIUM SERPL-MCNC: 1.9 MG/DL — SIGNIFICANT CHANGE UP (ref 1.6–2.6)
MCHC RBC-ENTMCNC: 29.6 PG — SIGNIFICANT CHANGE UP (ref 27–34)
MCHC RBC-ENTMCNC: 31.9 GM/DL — LOW (ref 32–36)
MCV RBC AUTO: 92.5 FL — SIGNIFICANT CHANGE UP (ref 80–100)
NRBC # BLD: 0 /100 WBCS — SIGNIFICANT CHANGE UP (ref 0–0)
NRBC # FLD: 0 K/UL — SIGNIFICANT CHANGE UP (ref 0–0)
PHOSPHATE SERPL-MCNC: 2.3 MG/DL — LOW (ref 2.5–4.5)
PLATELET # BLD AUTO: 222 K/UL — SIGNIFICANT CHANGE UP (ref 150–400)
POTASSIUM SERPL-MCNC: 3.7 MMOL/L — SIGNIFICANT CHANGE UP (ref 3.5–5.3)
POTASSIUM SERPL-SCNC: 3.7 MMOL/L — SIGNIFICANT CHANGE UP (ref 3.5–5.3)
PROT SERPL-MCNC: 4.9 G/DL — LOW (ref 6–8.3)
PROTHROM AB SERPL-ACNC: 17.5 SEC — HIGH (ref 10.5–13.4)
RBC # BLD: 3.62 M/UL — LOW (ref 3.8–5.2)
RBC # FLD: 13.1 % — SIGNIFICANT CHANGE UP (ref 10.3–14.5)
SODIUM SERPL-SCNC: 136 MMOL/L — SIGNIFICANT CHANGE UP (ref 135–145)
WBC # BLD: 16.69 K/UL — HIGH (ref 3.8–10.5)
WBC # FLD AUTO: 16.69 K/UL — HIGH (ref 3.8–10.5)

## 2022-09-25 PROCEDURE — 99291 CRITICAL CARE FIRST HOUR: CPT

## 2022-09-25 PROCEDURE — 71045 X-RAY EXAM CHEST 1 VIEW: CPT | Mod: 26

## 2022-09-25 RX ORDER — INSULIN GLARGINE 100 [IU]/ML
8 INJECTION, SOLUTION SUBCUTANEOUS EVERY MORNING
Refills: 0 | Status: DISCONTINUED | OUTPATIENT
Start: 2022-09-26 | End: 2022-09-27

## 2022-09-25 RX ORDER — MIDAZOLAM HYDROCHLORIDE 1 MG/ML
2 INJECTION, SOLUTION INTRAMUSCULAR; INTRAVENOUS ONCE
Refills: 0 | Status: DISCONTINUED | OUTPATIENT
Start: 2022-09-25 | End: 2022-09-24

## 2022-09-25 RX ORDER — CHLORHEXIDINE GLUCONATE 213 G/1000ML
15 SOLUTION TOPICAL EVERY 12 HOURS
Refills: 0 | Status: DISCONTINUED | OUTPATIENT
Start: 2022-09-25 | End: 2022-09-28

## 2022-09-25 RX ORDER — ACETYLCYSTEINE 200 MG/ML
4 VIAL (ML) MISCELLANEOUS DAILY
Refills: 0 | Status: DISCONTINUED | OUTPATIENT
Start: 2022-09-25 | End: 2022-09-25

## 2022-09-25 RX ORDER — PROPOFOL 10 MG/ML
20 INJECTION, EMULSION INTRAVENOUS
Qty: 1000 | Refills: 0 | Status: DISCONTINUED | OUTPATIENT
Start: 2022-09-25 | End: 2022-09-25

## 2022-09-25 RX ORDER — PHENYLEPHRINE HYDROCHLORIDE 10 MG/ML
0.5 INJECTION INTRAVENOUS
Qty: 40 | Refills: 0 | Status: DISCONTINUED | OUTPATIENT
Start: 2022-09-25 | End: 2022-09-27

## 2022-09-25 RX ORDER — IPRATROPIUM/ALBUTEROL SULFATE 18-103MCG
3 AEROSOL WITH ADAPTER (GRAM) INHALATION ONCE
Refills: 0 | Status: COMPLETED | OUTPATIENT
Start: 2022-09-25 | End: 2022-09-25

## 2022-09-25 RX ORDER — MAGNESIUM SULFATE 500 MG/ML
2 VIAL (ML) INJECTION ONCE
Refills: 0 | Status: COMPLETED | OUTPATIENT
Start: 2022-09-25 | End: 2022-09-25

## 2022-09-25 RX ORDER — POTASSIUM CHLORIDE 20 MEQ
20 PACKET (EA) ORAL
Refills: 0 | Status: COMPLETED | OUTPATIENT
Start: 2022-09-25 | End: 2022-09-25

## 2022-09-25 RX ORDER — FENTANYL CITRATE 50 UG/ML
50 INJECTION INTRAVENOUS ONCE
Refills: 0 | Status: DISCONTINUED | OUTPATIENT
Start: 2022-09-25 | End: 2022-09-25

## 2022-09-25 RX ORDER — POTASSIUM CHLORIDE 20 MEQ
10 PACKET (EA) ORAL
Refills: 0 | Status: DISCONTINUED | OUTPATIENT
Start: 2022-09-25 | End: 2022-09-25

## 2022-09-25 RX ORDER — ACETYLCYSTEINE 200 MG/ML
4 VIAL (ML) MISCELLANEOUS DAILY
Refills: 0 | Status: COMPLETED | OUTPATIENT
Start: 2022-09-25 | End: 2022-09-27

## 2022-09-25 RX ORDER — DEXMEDETOMIDINE HYDROCHLORIDE IN 0.9% SODIUM CHLORIDE 4 UG/ML
1 INJECTION INTRAVENOUS
Qty: 400 | Refills: 0 | Status: DISCONTINUED | OUTPATIENT
Start: 2022-09-25 | End: 2022-09-27

## 2022-09-25 RX ORDER — DEXTROSE MONOHYDRATE, SODIUM CHLORIDE, AND POTASSIUM CHLORIDE 50; .745; 4.5 G/1000ML; G/1000ML; G/1000ML
1000 INJECTION, SOLUTION INTRAVENOUS
Refills: 0 | Status: DISCONTINUED | OUTPATIENT
Start: 2022-09-25 | End: 2022-09-27

## 2022-09-25 RX ORDER — MIDAZOLAM HYDROCHLORIDE 1 MG/ML
2 INJECTION, SOLUTION INTRAMUSCULAR; INTRAVENOUS ONCE
Refills: 0 | Status: DISCONTINUED | OUTPATIENT
Start: 2022-09-25 | End: 2022-09-25

## 2022-09-25 RX ORDER — POTASSIUM PHOSPHATE, MONOBASIC POTASSIUM PHOSPHATE, DIBASIC 236; 224 MG/ML; MG/ML
30 INJECTION, SOLUTION INTRAVENOUS ONCE
Refills: 0 | Status: COMPLETED | OUTPATIENT
Start: 2022-09-25 | End: 2022-09-25

## 2022-09-25 RX ORDER — SODIUM CHLORIDE 9 MG/ML
1000 INJECTION, SOLUTION INTRAVENOUS ONCE
Refills: 0 | Status: COMPLETED | OUTPATIENT
Start: 2022-09-25 | End: 2022-09-25

## 2022-09-25 RX ADMIN — Medication 4 MILLILITER(S): at 07:14

## 2022-09-25 RX ADMIN — CHLORHEXIDINE GLUCONATE 15 MILLILITER(S): 213 SOLUTION TOPICAL at 17:28

## 2022-09-25 RX ADMIN — CHLORHEXIDINE GLUCONATE 1 APPLICATION(S): 213 SOLUTION TOPICAL at 05:56

## 2022-09-25 RX ADMIN — PANTOPRAZOLE SODIUM 40 MILLIGRAM(S): 20 TABLET, DELAYED RELEASE ORAL at 12:00

## 2022-09-25 RX ADMIN — HYDROMORPHONE HYDROCHLORIDE 0.5 MILLIGRAM(S): 2 INJECTION INTRAMUSCULAR; INTRAVENOUS; SUBCUTANEOUS at 03:45

## 2022-09-25 RX ADMIN — FENTANYL CITRATE 50 MICROGRAM(S): 50 INJECTION INTRAVENOUS at 00:00

## 2022-09-25 RX ADMIN — Medication 200 MILLIGRAM(S): at 05:55

## 2022-09-25 RX ADMIN — Medication 50 MILLIEQUIVALENT(S): at 03:50

## 2022-09-25 RX ADMIN — Medication 100 MILLIGRAM(S): at 05:55

## 2022-09-25 RX ADMIN — CHLORHEXIDINE GLUCONATE 15 MILLILITER(S): 213 SOLUTION TOPICAL at 05:56

## 2022-09-25 RX ADMIN — SODIUM CHLORIDE 2000 MILLILITER(S): 9 INJECTION, SOLUTION INTRAVENOUS at 00:30

## 2022-09-25 RX ADMIN — Medication 400 MILLIGRAM(S): at 11:58

## 2022-09-25 RX ADMIN — Medication 400 MILLIGRAM(S): at 00:45

## 2022-09-25 RX ADMIN — Medication 100 MILLIGRAM(S): at 13:13

## 2022-09-25 RX ADMIN — HYDROMORPHONE HYDROCHLORIDE 0.5 MILLIGRAM(S): 2 INJECTION INTRAMUSCULAR; INTRAVENOUS; SUBCUTANEOUS at 03:31

## 2022-09-25 RX ADMIN — Medication 50 MILLIEQUIVALENT(S): at 05:54

## 2022-09-25 RX ADMIN — HEPARIN SODIUM 5000 UNIT(S): 5000 INJECTION INTRAVENOUS; SUBCUTANEOUS at 13:13

## 2022-09-25 RX ADMIN — Medication 3 MILLILITER(S): at 05:40

## 2022-09-25 RX ADMIN — PHENYLEPHRINE HYDROCHLORIDE 14.1 MICROGRAM(S)/KG/MIN: 10 INJECTION INTRAVENOUS at 21:01

## 2022-09-25 RX ADMIN — DEXMEDETOMIDINE HYDROCHLORIDE IN 0.9% SODIUM CHLORIDE 18.8 MICROGRAM(S)/KG/HR: 4 INJECTION INTRAVENOUS at 21:01

## 2022-09-25 RX ADMIN — INSULIN GLARGINE 12 UNIT(S): 100 INJECTION, SOLUTION SUBCUTANEOUS at 08:08

## 2022-09-25 RX ADMIN — DEXMEDETOMIDINE HYDROCHLORIDE IN 0.9% SODIUM CHLORIDE 18.8 MICROGRAM(S)/KG/HR: 4 INJECTION INTRAVENOUS at 05:54

## 2022-09-25 RX ADMIN — PROPOFOL 9.04 MICROGRAM(S)/KG/MIN: 10 INJECTION, EMULSION INTRAVENOUS at 01:00

## 2022-09-25 RX ADMIN — SODIUM CHLORIDE 100 MILLILITER(S): 9 INJECTION, SOLUTION INTRAVENOUS at 08:16

## 2022-09-25 RX ADMIN — Medication 200 MILLIGRAM(S): at 17:17

## 2022-09-25 RX ADMIN — HEPARIN SODIUM 5000 UNIT(S): 5000 INJECTION INTRAVENOUS; SUBCUTANEOUS at 06:14

## 2022-09-25 RX ADMIN — Medication 400 MILLIGRAM(S): at 05:55

## 2022-09-25 RX ADMIN — Medication 100 MILLIGRAM(S): at 21:01

## 2022-09-25 RX ADMIN — HEPARIN SODIUM 5000 UNIT(S): 5000 INJECTION INTRAVENOUS; SUBCUTANEOUS at 21:01

## 2022-09-25 RX ADMIN — DEXMEDETOMIDINE HYDROCHLORIDE IN 0.9% SODIUM CHLORIDE 18.8 MICROGRAM(S)/KG/HR: 4 INJECTION INTRAVENOUS at 08:12

## 2022-09-25 RX ADMIN — Medication 400 MILLIGRAM(S): at 17:15

## 2022-09-25 RX ADMIN — PHENYLEPHRINE HYDROCHLORIDE 14.1 MICROGRAM(S)/KG/MIN: 10 INJECTION INTRAVENOUS at 08:16

## 2022-09-25 RX ADMIN — POTASSIUM PHOSPHATE, MONOBASIC POTASSIUM PHOSPHATE, DIBASIC 83.33 MILLIMOLE(S): 236; 224 INJECTION, SOLUTION INTRAVENOUS at 05:55

## 2022-09-25 RX ADMIN — PHENYLEPHRINE HYDROCHLORIDE 14.1 MICROGRAM(S)/KG/MIN: 10 INJECTION INTRAVENOUS at 03:02

## 2022-09-25 RX ADMIN — Medication 25 GRAM(S): at 03:38

## 2022-09-25 RX ADMIN — FENTANYL CITRATE 50 MICROGRAM(S): 50 INJECTION INTRAVENOUS at 00:15

## 2022-09-25 RX ADMIN — DEXMEDETOMIDINE HYDROCHLORIDE IN 0.9% SODIUM CHLORIDE 18.8 MICROGRAM(S)/KG/HR: 4 INJECTION INTRAVENOUS at 04:25

## 2022-09-25 NOTE — AIRWAY REMOVAL NOTE  ADULT & PEDS - REASON ARTIFICAL AIRWAY REMOVED:
Recommendation Preamble: Microdermabrasion in two weeks Detail Level: Zone reason for artificial airway has resolved

## 2022-09-25 NOTE — PROGRESS NOTE ADULT - SUBJECTIVE AND OBJECTIVE BOX
SURGERY PROGRESS NOTE    SUBJECTIVE    Re-intubated overnight 2/2 acute hypoxic respiratory failure refractory on NC and NRB. Bedside bronchoscopy demonstrated mucus plug L bronchi, lavaged, irrigated, suctioned.    10-point review of systems completed and negative except as noted above.      OBJECTIVE    MEDICATIONS  acetaminophen   IVPB .. 1000 milliGRAM(s) IV Intermittent every 6 hours  acetylcysteine 20%  Inhalation 4 milliLiter(s) Inhalation daily  chlorhexidine 0.12% Liquid 15 milliLiter(s) Oral Mucosa every 12 hours  chlorhexidine 4% Liquid 1 Application(s) Topical <User Schedule>  ciprofloxacin   IVPB 400 milliGRAM(s) IV Intermittent every 12 hours  dexMEDEtomidine Infusion 1 MICROgram(s)/kG/Hr IV Continuous <Continuous>  heparin   Injectable 5000 Unit(s) SubCutaneous every 8 hours  HYDROmorphone  Injectable 0.5 milliGRAM(s) IV Push every 4 hours PRN  HYDROmorphone  Injectable 0.25 milliGRAM(s) IV Push every 4 hours PRN  insulin glargine Injectable (LANTUS) 12 Unit(s) SubCutaneous every morning  insulin lispro (ADMELOG) corrective regimen sliding scale   SubCutaneous every 6 hours  lactated ringers. 1000 milliLiter(s) IV Continuous <Continuous>  metroNIDAZOLE  IVPB      metroNIDAZOLE  IVPB 500 milliGRAM(s) IV Intermittent every 8 hours  pantoprazole  Injectable 40 milliGRAM(s) IV Push daily  phenylephrine    Infusion 0.5 MICROgram(s)/kG/Min IV Continuous <Continuous>  sodium chloride 0.9% lock flush 10 milliLiter(s) IV Push every 1 hour PRN      PHYSICAL EXAM  T(C): 38.3 (09-25-22 @ 12:00), Max: 38.3 (09-25-22 @ 12:00)  HR: 61 (09-25-22 @ 12:00) (61 - 142)  BP: 127/50 (09-24-22 @ 22:15) (127/50 - 127/50)  RR: 18 (09-25-22 @ 12:00) (11 - 33)  SpO2: 100% (09-25-22 @ 12:00) (68% - 100%)    09-24-22 @ 07:01  -  09-25-22 @ 07:00  --------------------------------------------------------  IN: 4106.3 mL / OUT: 4077 mL / NET: 29.3 mL    09-25-22 @ 07:01  -  09-25-22 @ 13:21  --------------------------------------------------------  IN: 657.6 mL / OUT: 400 mL / NET: 257.6 mL        Gen: Intubated, sedated  CV: RRR, S1, S2  Pulm: Normal chest wall expansion   Abd: soft, incisions ATTP w/ dressings c/d/i, serosanguinous MARGRET drainage  Ext: Peripheral pulses intact    LABS                        10.7   16.69 )-----------( 222      ( 25 Sep 2022 01:00 )             33.5     09-25    136  |  104  |  13  ----------------------------<  122<H>  3.7   |  23  |  0.84    Ca    7.4<L>      25 Sep 2022 01:00  Phos  2.3     09-25  Mg     1.90     09-25    TPro  4.9<L>  /  Alb  2.5<L>  /  TBili  0.8  /  DBili  x   /  AST  27  /  ALT  16  /  AlkPhos  48  09-25    PT/INR - ( 25 Sep 2022 01:00 )   PT: 17.5 sec;   INR: 1.50 ratio         PTT - ( 25 Sep 2022 01:00 )  PTT:40.1 sec      RADIOLOGY & ADDITIONAL STUDIES

## 2022-09-25 NOTE — PROGRESS NOTE ADULT - ASSESSMENT
72F w/ diagnosis of pancreatic cancer on endoscopy, s/p exploratory laparotomy with Whipple procedure, and placement of drains R posterior and L anterior to the anastomoses on 9/23. Drain amylase downtrending.    Plan:   - NPO/IVF hydration  - IV abx: Cipro/Flagyl  - PRN analgesia  - Wean sedation/vent per SICU  - Continue hernandez  - Monitor I&Os  - Monitor MARGRET drain output  - Monitor bowel function  - f/u AM labs  - f/u drain amylase  - DVT ppx  - Appreciate excellent care per SICU      [facility]  P #[id number]

## 2022-09-25 NOTE — PROCEDURE NOTE - NSBRONCHFINDINGS_GEN_A_CORE_FT
L Main Bronchi with proximal obstructing white mucus plug. Copious suction and irrigation performed to clear airway. Distal bronchi patent and without any obstructing mucus. R Main bronchi clear and patent without evidence of distal secretions.  Portable bronchoscopy performed through L Main Bronchi with proximal obstructing white mucus plug. Copious suction and irrigation performed to clear airway. Distal bronchi patent and without any obstructing mucus. R Main bronchi clear and patent without evidence of distal secretions. Will f/u CXR to confirm clear lungs b/l. Repeat bronchoscopy as needed.     Javier Reis MD PGY2   SICU [id number]

## 2022-09-25 NOTE — PROGRESS NOTE ADULT - SUBJECTIVE AND OBJECTIVE BOX
POD # 3    Subjective: The patient is intubated and on Precedex.  Had a failed attempt at extubation yesterday, due to a viscous bronchial probe    Objective:   Vital Signs Last 24 Hrs  T(C): 37.7 (25 Sep 2022 16:00), Max: 38.3 (25 Sep 2022 12:00)  T(F): 99.9 (25 Sep 2022 16:00), Max: 100.9 (25 Sep 2022 12:00)  HR: 58 (25 Sep 2022 23:43) (57 - 122)  BP: --  BP(mean): --  RR: 18 (25 Sep 2022 22:00) (11 - 24)  SpO2: 100% (25 Sep 2022 23:43) (80% - 100%)    Parameters below as of 25 Sep 2022 21:00  Patient On (Oxygen Delivery Method): ventilator    O2 Concentration (%): 40    on Phenylephrine drip and IV Cipro and Flagyl.  Daily Weight in k.1 (25 Sep 2022 05:00)  Patient unresponsive on mechanical ventilation (AC with PEEP0  Heent: N/C, AT PER no scleral icterus, No JVD  Pul: clear  Cor: RRR  Abdomen: soft, non-distended. Wound - clean- with staples  Extremities: without edema, motor/sensory intact    I&O's Detail    24 Sep 2022 07:01  -  25 Sep 2022 07:00  --------------------------------------------------------  IN:    Albumin 5%  - 250 mL: 250 mL    Dexmedetomidine: 90.4 mL    IV PiggyBack: 1100 mL    Lactated Ringers: 2400 mL    Phenylephrine: 211.7 mL    Propofol: 54.2 mL  Total IN: 4106.3 mL    OUT:    Drain (mL): 130 mL    Drain (mL): 1470 mL    Indwelling Catheter - Urethral (mL): 1877 mL    Nasogastric/Oral tube (mL): 600 mL  Total OUT: 4077 mL    Total NET: 29.3 mL      25 Sep 2022 07:01  -  25 Sep 2022 23:47  --------------------------------------------------------  IN:    Dexmedetomidine: 299.1 mL    dextrose 5% + sodium chloride 0.45% w/ Additives: 500 mL    IV PiggyBack: 100 mL    Lactated Ringers: 1000 mL    Phenylephrine: 127.5 mL  Total IN: 2026.6 mL    OUT:    Drain (mL): 245 mL    Drain (mL): 60 mL    Indwelling Catheter - Urethral (mL): 1035 mL    Nasogastric/Oral tube (mL): 300 mL  Total OUT: 1640 mL    Total NET: 386.6 mL          MEDICATIONS  (STANDING):  acetylcysteine 20%  Inhalation 4 milliLiter(s) Inhalation daily  chlorhexidine 0.12% Liquid 15 milliLiter(s) Oral Mucosa every 12 hours  chlorhexidine 4% Liquid 1 Application(s) Topical <User Schedule>  ciprofloxacin   IVPB 400 milliGRAM(s) IV Intermittent every 12 hours  dexMEDEtomidine Infusion 1 MICROgram(s)/kG/Hr (18.8 mL/Hr) IV Continuous <Continuous>  dextrose 5% + sodium chloride 0.45% with potassium chloride 20 mEq/L 1000 milliLiter(s) (100 mL/Hr) IV Continuous <Continuous>  heparin   Injectable 5000 Unit(s) SubCutaneous every 8 hours  insulin glargine Injectable (LANTUS) 8 Unit(s) SubCutaneous every morning  insulin lispro (ADMELOG) corrective regimen sliding scale   SubCutaneous every 6 hours  metroNIDAZOLE  IVPB      metroNIDAZOLE  IVPB 500 milliGRAM(s) IV Intermittent every 8 hours  pantoprazole  Injectable 40 milliGRAM(s) IV Push daily  phenylephrine    Infusion 0.5 MICROgram(s)/kG/Min (14.1 mL/Hr) IV Continuous <Continuous>    MEDICATIONS  (PRN):  HYDROmorphone  Injectable 0.5 milliGRAM(s) IV Push every 4 hours PRN Severe Pain (7 - 10)  HYDROmorphone  Injectable 0.25 milliGRAM(s) IV Push every 4 hours PRN Moderate Pain (4 - 6)  sodium chloride 0.9% lock flush 10 milliLiter(s) IV Push every 1 hour PRN Pre/post blood products, medications, blood draw, and to maintain line patency                            10.7   16.69 )-----------( 222      ( 25 Sep 2022 01:00 )             33.5     09-25    136  |  104  |  13  ----------------------------<  122<H>  3.7   |  23  |  0.84    Ca    7.4<L>      25 Sep 2022 01:00  Phos  2.3       Mg     1.90         TPro  4.9<L>  /  Alb  2.5<L>  /  TBili  0.8  /  DBili  x   /  AST  27  /  ALT  16  /  AlkPhos  48      PT/INR - ( 25 Sep 2022 01:00 )   PT: 17.5 sec;   INR: 1.50 ratio         PTT - ( 25 Sep 2022 01: )  PTT:40.1 sec    Radiologic Studies:

## 2022-09-25 NOTE — PROGRESS NOTE ADULT - ASSESSMENT
73 y/o female s/p Whipple 2/2 pancreatic cancer recovering in SICU post operatively. Extubated afternoon 09/24, reintubated o/n 09/24 2/2 hypoxic respiratory failure.     PLAN  NEUROLOGIC   - Pain control: Tylenol  - Precedex  - Dilaudid PRN  - Wean sedation as tolerated       RESPIRATORY   - Extubated 09/24 afternoon, reintubated 09/24 o/n  - Hypoxic respiratory failure w CXR demonstrating L lung white out  - Bronchoscopy-L Bronchi obstructing mucus plug  - AC 18/400/5/50  - CPAP trials, extubate if tolerating  - Mucomyst, Duoneb    CARDIOVASCULAR   - MAP> 65  - q1 Vitals   - Wean cem as tolerated     GASTROINTESTINAL   - Diet: NPO, NGT   - Monitor bowel function  - Monitor drain output  - Drain amylases  - NOT on Whipple pathway    /RENAL   - IV fluids: LR @ 100 cc/hr  - Strict I/Os  - Monitor BMP qd  - Maintain hernandez catheter, strict Is/Os  - Monitor electrolytes, replete PRN    HEMATOLOGIC  - Monitor H/H   - DVT ppx SQH    INFECTIOUS DISEASE  - Monitor fever / WBC  - C/W Cipro and Flagyl x7d per primary team    ENDOCRINE  - Monitor gluc    LINES  - RIJ Triple Lumen CVC   - A line L radial  - Hernandez  - PIV     DISPO: SICU   71 y/o female s/p Whipple 2/2 pancreatic cancer recovering in SICU post operatively. Extubated afternoon 09/24, reintubated o/n 09/24 2/2 hypoxic respiratory failure.     PLAN  NEUROLOGIC   - Pain control: Tylenol  - Precedex  - Dilaudid PRN  - Wean sedation as tolerated       RESPIRATORY   - Extubated 09/24 afternoon, reintubated 09/24 o/n  - Hypoxic respiratory failure w CXR demonstrating L lung white out  - Bronchoscopy-L Bronchi obstructing mucus plug  - AC 18/400/5/50  - CPAP trials, extubate if tolerating  - Mucomyst, Duoneb  - repeat CXR    CARDIOVASCULAR   - MAP> 65  - q1 Vitals   - Wean cem as tolerated     GASTROINTESTINAL   - Diet: NPO, NGT   - Monitor bowel function  - Monitor drain output  - Drain amylases  - NOT on Whipple pathway    /RENAL   - IV fluids: LR @ 100 cc/hr  - Strict I/Os  - Monitor BMP qd  - Maintain hernandez catheter, strict Is/Os  - Monitor electrolytes, replete PRN    HEMATOLOGIC  - Monitor H/H   - DVT ppx SQH    INFECTIOUS DISEASE  - Monitor fever / WBC  - C/W Cipro and Flagyl x7d per primary team    ENDOCRINE  - Monitor gluc    LINES  - RIJ Triple Lumen CVC   - A line L radial  - Hernandez  - PIV     DISPO: SICU

## 2022-09-25 NOTE — PROGRESS NOTE ADULT - SUBJECTIVE AND OBJECTIVE BOX
24 HOUR EVENTS:  -Extubated in PM  -Acute hypoxic respiratory failure o/n refractory to NC, NRB  -CXR demonstrating white-out L lung  -Reintubated   -Bedside Bronchoscopy-Mucus plug L bronchi, lavaged, irrigated, suctioned  -Repeat CXR clear b/l   -HypoT o/n i/s/o sedation, requiring Alon    SUBJECTIVE/ROS:  [ ] A ten-point review of systems was otherwise negative except as noted.  [ ] Due to altered mental status/intubation, subjective information were not able to be obtained from the patient. History was obtained, to the extent possible, from review of the chart and collateral sources of information.      NEURO  RASS:  -1      Exam: sedated   Meds: acetaminophen   IVPB .. 1000 milliGRAM(s) IV Intermittent every 6 hours  dexMEDEtomidine Infusion 1 MICROgram(s)/kG/Hr IV Continuous <Continuous>  fentaNYL    Injectable 50 MICROGram(s) IV Push once  HYDROmorphone  Injectable 0.5 milliGRAM(s) IV Push every 4 hours PRN Severe Pain (7 - 10)  HYDROmorphone  Injectable 0.25 milliGRAM(s) IV Push every 4 hours PRN Moderate Pain (4 - 6)  midazolam Injectable 2 milliGRAM(s) IV Push once  midazolam Injectable 2 milliGRAM(s) IV Push once  propofol Infusion 20 MICROgram(s)/kG/Min IV Continuous <Continuous>    [x] Adequacy of sedation and pain control has been assessed and adjusted      RESPIRATORY  RR: 16 (09-25-22 @ 02:15) (11 - 38)  SpO2: 97% (09-25-22 @ 02:15) (80% - 100%)  Wt(kg): --  Exam: unlabored, clear to auscultation bilaterally  Mechanical Ventilation: Mode: AC/ CMV (Assist Control/ Continuous Mandatory Ventilation), RR (machine): 16, RR (patient): 16, TV (machine): 350, FiO2: 50, PEEP: 5, ITime: 0.8, MAP: 10, PIP: 20  ABG - ( 25 Sep 2022 01:00 )  pH: 7.32  /  pCO2: 48    /  pO2: 97    / HCO3: 25    / Base Excess: -1.7  /  SaO2: 99.6    Lactate: x                [N/A] Extubation Readiness Assessed  Meds: acetylcysteine 10%  Inhalation 4 milliLiter(s) Inhalation daily  albuterol/ipratropium for Nebulization. 3 milliLiter(s) Nebulizer once        CARDIOVASCULAR  HR: 78 (09-25-22 @ 02:15) (70 - 127)  BP: 127/50 (09-24-22 @ 22:15) (127/50 - 127/50)  BP(mean): 72 (09-24-22 @ 22:15) (72 - 72)  ABP: 115/44 (09-25-22 @ 02:15) (91/40 - 207/87)  ABP(mean): 68 (09-25-22 @ 02:15) (55 - 138)  Wt(kg): --  CVP(cm H2O): --      Exam: regular rate and rhythm  Cardiac Rhythm: sinus  Perfusion     [x]Adequate   [ ]Inadequate  Mentation   [x]Normal       [ ]Reduced  Extremities  [x]Warm         [ ]Cool  Volume Status [ ]Hypervolemic [x]Euvolemic [ ]Hypovolemic  Meds: metoprolol tartrate Injectable 5 milliGRAM(s) IV Push every 6 hours  phenylephrine    Infusion 0.5 MICROgram(s)/kG/Min IV Continuous <Continuous>        GI/NUTRITION  Exam: soft, nondistended, MARGRET x2 SS, Midline incision with dressing CD, NGT LWS   Diet: NPO  Meds: pantoprazole  Injectable 40 milliGRAM(s) IV Push daily      GENITOURINARY  I&O's Detail    09-23 @ 07:01  -  09-24 @ 07:00  --------------------------------------------------------  IN:    IV PiggyBack: 2333.1 mL    Lactated Ringers: 2400 mL  Total IN: 4733.1 mL    OUT:    Dexmedetomidine: 0 mL    Drain (mL): 85 mL    Drain (mL): 155 mL    Indwelling Catheter - Urethral (mL): 2179 mL    Nasogastric/Oral tube (mL): 550 mL    Phenylephrine: 0 mL  Total OUT: 2969 mL    Total NET: 1764.1 mL      09-24 @ 07:01  -  09-25 @ 02:43  --------------------------------------------------------  IN:    Albumin 5%  - 250 mL: 250 mL    IV PiggyBack: 700 mL    Lactated Ringers: 2000 mL  Total IN: 2950 mL    OUT:    Drain (mL): 110 mL    Drain (mL): 1170 mL    Indwelling Catheter - Urethral (mL): 1667 mL    Nasogastric/Oral tube (mL): 500 mL  Total OUT: 3447 mL    Total NET: -497 mL          09-25    136  |  104  |  13  ----------------------------<  122<H>  3.7   |  23  |  0.84    Ca    7.4<L>      25 Sep 2022 01:00  Phos  2.3     09-25  Mg     1.90     09-25    TPro  4.9<L>  /  Alb  2.5<L>  /  TBili  0.8  /  DBili  x   /  AST  27  /  ALT  16  /  AlkPhos  48  09-25    [ ] Melgar catheter, indication: N/A  Meds: lactated ringers Bolus 1000 milliLiter(s) IV Bolus once  lactated ringers. 1000 milliLiter(s) IV Continuous <Continuous>  sodium chloride 0.9% lock flush 10 milliLiter(s) IV Push every 1 hour PRN Pre/post blood products, medications, blood draw, and to maintain line patency        HEMATOLOGIC  Meds: heparin   Injectable 5000 Unit(s) SubCutaneous every 8 hours    [x] VTE Prophylaxis                        10.7   16.69 )-----------( 222      ( 25 Sep 2022 01:00 )             33.5     PT/INR - ( 25 Sep 2022 01:00 )   PT: 17.5 sec;   INR: 1.50 ratio         PTT - ( 25 Sep 2022 01:00 )  PTT:40.1 sec  Transfusion     [ ] PRBC   [ ] Platelets   [ ] FFP   [ ] Cryoprecipitate      INFECTIOUS DISEASES  WBC Count: 16.69 K/uL (09-25 @ 01:00)    RECENT CULTURES:    Meds: ciprofloxacin   IVPB 400 milliGRAM(s) IV Intermittent every 12 hours  metroNIDAZOLE  IVPB      metroNIDAZOLE  IVPB 500 milliGRAM(s) IV Intermittent every 8 hours        ENDOCRINE  CAPILLARY BLOOD GLUCOSE      POCT Blood Glucose.: 109 mg/dL (25 Sep 2022 00:57)  POCT Blood Glucose.: 121 mg/dL (24 Sep 2022 17:17)  POCT Blood Glucose.: 149 mg/dL (24 Sep 2022 12:00)  POCT Blood Glucose.: 127 mg/dL (24 Sep 2022 08:59)  POCT Blood Glucose.: 118 mg/dL (24 Sep 2022 06:26)    Meds: insulin glargine Injectable (LANTUS) 12 Unit(s) SubCutaneous every morning  insulin lispro (ADMELOG) corrective regimen sliding scale   SubCutaneous every 6 hours        ACCESS DEVICES:  [x] Peripheral IV  [ x] Central Venous Line	[x ] R	[ ] L	[ x] IJ	[ ] Fem	[ ] SC	Placed:   [x ] Arterial Line		[ ] R	[x ] L	[ ] Fem	[x ] Rad	[ ] Ax	Placed:   [ ] PICC:					[ ] Mediport  [ ] Urinary Catheter, Date Placed:   [x] Necessity of urinary, arterial, and venous catheters discussed    OTHER MEDICATIONS:  chlorhexidine 0.12% Liquid 15 milliLiter(s) Oral Mucosa every 12 hours  chlorhexidine 4% Liquid 1 Application(s) Topical <User Schedule>  tetracaine/benzocaine/butamben Spray 1 Spray(s) Topical once PRN     24 HOUR EVENTS:  -Extubated in PM  -Acute hypoxic respiratory failure o/n refractory to NC, NRB  -CXR demonstrating white-out L lung  -Reintubated   -Bedside Bronchoscopy-Mucus plug L bronchi, lavaged, irrigated, suctioned  -Repeat CXR clear b/l   -HypoT o/n i/s/o sedation, requiring Alon    NEURO  RASS:  -1      Exam: sedated   Meds: acetaminophen   IVPB .. 1000 milliGRAM(s) IV Intermittent every 6 hours  dexMEDEtomidine Infusion 1 MICROgram(s)/kG/Hr IV Continuous <Continuous>  fentaNYL    Injectable 50 MICROGram(s) IV Push once  HYDROmorphone  Injectable 0.5 milliGRAM(s) IV Push every 4 hours PRN Severe Pain (7 - 10)  HYDROmorphone  Injectable 0.25 milliGRAM(s) IV Push every 4 hours PRN Moderate Pain (4 - 6)  midazolam Injectable 2 milliGRAM(s) IV Push once  midazolam Injectable 2 milliGRAM(s) IV Push once  propofol Infusion 20 MICROgram(s)/kG/Min IV Continuous <Continuous>    [x] Adequacy of sedation and pain control has been assessed and adjusted      RESPIRATORY  RR: 16 (09-25-22 @ 02:15) (11 - 38)  SpO2: 97% (09-25-22 @ 02:15) (80% - 100%)  Wt(kg): --  Exam: unlabored, clear to auscultation bilaterally  Mechanical Ventilation: Mode: AC/ CMV (Assist Control/ Continuous Mandatory Ventilation), RR (machine): 16, RR (patient): 16, TV (machine): 350, FiO2: 50, PEEP: 5, ITime: 0.8, MAP: 10, PIP: 20  ABG - ( 25 Sep 2022 01:00 )  pH: 7.32  /  pCO2: 48    /  pO2: 97    / HCO3: 25    / Base Excess: -1.7  /  SaO2: 99.6    Lactate: x                [N/A] Extubation Readiness Assessed  Meds: acetylcysteine 10%  Inhalation 4 milliLiter(s) Inhalation daily  albuterol/ipratropium for Nebulization. 3 milliLiter(s) Nebulizer once        CARDIOVASCULAR  HR: 78 (09-25-22 @ 02:15) (70 - 127)  BP: 127/50 (09-24-22 @ 22:15) (127/50 - 127/50)  BP(mean): 72 (09-24-22 @ 22:15) (72 - 72)  ABP: 115/44 (09-25-22 @ 02:15) (91/40 - 207/87)  ABP(mean): 68 (09-25-22 @ 02:15) (55 - 138)  Wt(kg): --  CVP(cm H2O): --      Exam: regular rate and rhythm  Cardiac Rhythm: sinus  Perfusion     [x]Adequate   [ ]Inadequate  Mentation   [x]Normal       [ ]Reduced  Extremities  [x]Warm         [ ]Cool  Volume Status [ ]Hypervolemic [x]Euvolemic [ ]Hypovolemic  Meds: metoprolol tartrate Injectable 5 milliGRAM(s) IV Push every 6 hours  phenylephrine    Infusion 0.5 MICROgram(s)/kG/Min IV Continuous <Continuous>        GI/NUTRITION  Exam: soft, nondistended, MARGRET x2 SS, Midline incision with dressing CD, NGT LWS   Diet: NPO  Meds: pantoprazole  Injectable 40 milliGRAM(s) IV Push daily      GENITOURINARY  I&O's Detail    09-23 @ 07:01 - 09-24 @ 07:00  --------------------------------------------------------  IN:    IV PiggyBack: 2333.1 mL    Lactated Ringers: 2400 mL  Total IN: 4733.1 mL    OUT:    Dexmedetomidine: 0 mL    Drain (mL): 85 mL    Drain (mL): 155 mL    Indwelling Catheter - Urethral (mL): 2179 mL    Nasogastric/Oral tube (mL): 550 mL    Phenylephrine: 0 mL  Total OUT: 2969 mL    Total NET: 1764.1 mL      09-24 @ 07:01  -  09-25 @ 02:43  --------------------------------------------------------  IN:    Albumin 5%  - 250 mL: 250 mL    IV PiggyBack: 700 mL    Lactated Ringers: 2000 mL  Total IN: 2950 mL    OUT:    Drain (mL): 110 mL    Drain (mL): 1170 mL    Indwelling Catheter - Urethral (mL): 1667 mL    Nasogastric/Oral tube (mL): 500 mL  Total OUT: 3447 mL    Total NET: -497 mL          09-25    136  |  104  |  13  ----------------------------<  122<H>  3.7   |  23  |  0.84    Ca    7.4<L>      25 Sep 2022 01:00  Phos  2.3     09-25  Mg     1.90     09-25    TPro  4.9<L>  /  Alb  2.5<L>  /  TBili  0.8  /  DBili  x   /  AST  27  /  ALT  16  /  AlkPhos  48  09-25    [ ] Melgar catheter, indication: N/A  Meds: lactated ringers Bolus 1000 milliLiter(s) IV Bolus once  lactated ringers. 1000 milliLiter(s) IV Continuous <Continuous>  sodium chloride 0.9% lock flush 10 milliLiter(s) IV Push every 1 hour PRN Pre/post blood products, medications, blood draw, and to maintain line patency        HEMATOLOGIC  Meds: heparin   Injectable 5000 Unit(s) SubCutaneous every 8 hours    [x] VTE Prophylaxis                        10.7   16.69 )-----------( 222      ( 25 Sep 2022 01:00 )             33.5     PT/INR - ( 25 Sep 2022 01:00 )   PT: 17.5 sec;   INR: 1.50 ratio         PTT - ( 25 Sep 2022 01:00 )  PTT:40.1 sec  Transfusion     [ ] PRBC   [ ] Platelets   [ ] FFP   [ ] Cryoprecipitate      INFECTIOUS DISEASES  WBC Count: 16.69 K/uL (09-25 @ 01:00)    RECENT CULTURES:    Meds: ciprofloxacin   IVPB 400 milliGRAM(s) IV Intermittent every 12 hours  metroNIDAZOLE  IVPB      metroNIDAZOLE  IVPB 500 milliGRAM(s) IV Intermittent every 8 hours        ENDOCRINE  CAPILLARY BLOOD GLUCOSE      POCT Blood Glucose.: 109 mg/dL (25 Sep 2022 00:57)  POCT Blood Glucose.: 121 mg/dL (24 Sep 2022 17:17)  POCT Blood Glucose.: 149 mg/dL (24 Sep 2022 12:00)  POCT Blood Glucose.: 127 mg/dL (24 Sep 2022 08:59)  POCT Blood Glucose.: 118 mg/dL (24 Sep 2022 06:26)    Meds: insulin glargine Injectable (LANTUS) 12 Unit(s) SubCutaneous every morning  insulin lispro (ADMELOG) corrective regimen sliding scale   SubCutaneous every 6 hours        ACCESS DEVICES:  [x] Peripheral IV  [ x] Central Venous Line	[x ] R	[ ] L	[ x] IJ	[ ] Fem	[ ] SC	Placed:   [x ] Arterial Line		[ ] R	[x ] L	[ ] Fem	[x ] Rad	[ ] Ax	Placed:   [ ] PICC:					[ ] Mediport  [ ] Urinary Catheter, Date Placed:   [x] Necessity of urinary, arterial, and venous catheters discussed    OTHER MEDICATIONS:  chlorhexidine 0.12% Liquid 15 milliLiter(s) Oral Mucosa every 12 hours  chlorhexidine 4% Liquid 1 Application(s) Topical <User Schedule>  tetracaine/benzocaine/butamben Spray 1 Spray(s) Topical once PRN

## 2022-09-25 NOTE — CHART NOTE - NSCHARTNOTEFT_GEN_A_CORE
Discussed patient with SICU team and patient is still intubated.  Will discontinue IV Dilaudid PCA hold orders.  Pain service to sign off. May call pain service if needed.

## 2022-09-25 NOTE — PROCEDURE NOTE - NSBRONCHPROCDETAILS_GEN_A_CORE_FT
Bedside bronchoscopy performed post intubation.  Bedside bronchoscopy performed post emergent intubation.

## 2022-09-25 NOTE — PROGRESS NOTE ADULT - ASSESSMENT
S/P Whipple procedure  Patient nutritionally depleted with significant pre-operative weight loss  No jaundice  In respiratory failure and hypotension.

## 2022-09-26 LAB
ALBUMIN SERPL ELPH-MCNC: 2 G/DL — LOW (ref 3.3–5)
ALP SERPL-CCNC: 54 U/L — SIGNIFICANT CHANGE UP (ref 40–120)
ALT FLD-CCNC: 14 U/L — SIGNIFICANT CHANGE UP (ref 4–33)
AMYLASE FLD-CCNC: 5566 U/L — SIGNIFICANT CHANGE UP
AMYLASE FLD-CCNC: 704 U/L — SIGNIFICANT CHANGE UP
ANION GAP SERPL CALC-SCNC: 9 MMOL/L — SIGNIFICANT CHANGE UP (ref 7–14)
AST SERPL-CCNC: 19 U/L — SIGNIFICANT CHANGE UP (ref 4–32)
BASE EXCESS BLDA CALC-SCNC: -2.9 MMOL/L — LOW (ref -2–3)
BILIRUB DIRECT SERPL-MCNC: 0.4 MG/DL — HIGH (ref 0–0.3)
BILIRUB INDIRECT FLD-MCNC: 0.4 MG/DL — SIGNIFICANT CHANGE UP (ref 0–1)
BILIRUB SERPL-MCNC: 0.8 MG/DL — SIGNIFICANT CHANGE UP (ref 0.2–1.2)
BLOOD GAS ARTERIAL - LYTES,HGB,ICA,LACT RESULT: SIGNIFICANT CHANGE UP
BUN SERPL-MCNC: 16 MG/DL — SIGNIFICANT CHANGE UP (ref 7–23)
CALCIUM SERPL-MCNC: 7.1 MG/DL — LOW (ref 8.4–10.5)
CHLORIDE SERPL-SCNC: 107 MMOL/L — SIGNIFICANT CHANGE UP (ref 98–107)
CO2 BLDA-SCNC: 22 MMOL/L — SIGNIFICANT CHANGE UP (ref 19–24)
CO2 SERPL-SCNC: 21 MMOL/L — LOW (ref 22–31)
CREAT SERPL-MCNC: 0.73 MG/DL — SIGNIFICANT CHANGE UP (ref 0.5–1.3)
EGFR: 87 ML/MIN/1.73M2 — SIGNIFICANT CHANGE UP
GLUCOSE BLDC GLUCOMTR-MCNC: 105 MG/DL — HIGH (ref 70–99)
GLUCOSE BLDC GLUCOMTR-MCNC: 116 MG/DL — HIGH (ref 70–99)
GLUCOSE BLDC GLUCOMTR-MCNC: 152 MG/DL — HIGH (ref 70–99)
GLUCOSE BLDC GLUCOMTR-MCNC: 156 MG/DL — HIGH (ref 70–99)
GLUCOSE BLDC GLUCOMTR-MCNC: 174 MG/DL — HIGH (ref 70–99)
GLUCOSE SERPL-MCNC: 155 MG/DL — HIGH (ref 70–99)
HCO3 BLDA-SCNC: 21 MMOL/L — SIGNIFICANT CHANGE UP (ref 21–28)
HCT VFR BLD CALC: 34.3 % — LOW (ref 34.5–45)
HGB BLD-MCNC: 11.2 G/DL — LOW (ref 11.5–15.5)
MAGNESIUM SERPL-MCNC: 2.5 MG/DL — SIGNIFICANT CHANGE UP (ref 1.6–2.6)
MCHC RBC-ENTMCNC: 29.2 PG — SIGNIFICANT CHANGE UP (ref 27–34)
MCHC RBC-ENTMCNC: 32.7 GM/DL — SIGNIFICANT CHANGE UP (ref 32–36)
MCV RBC AUTO: 89.6 FL — SIGNIFICANT CHANGE UP (ref 80–100)
NRBC # BLD: 0 /100 WBCS — SIGNIFICANT CHANGE UP (ref 0–0)
NRBC # FLD: 0 K/UL — SIGNIFICANT CHANGE UP (ref 0–0)
PCO2 BLDA: 33 MMHG — SIGNIFICANT CHANGE UP (ref 32–35)
PH BLDA: 7.41 — SIGNIFICANT CHANGE UP (ref 7.35–7.45)
PHOSPHATE SERPL-MCNC: 1.7 MG/DL — LOW (ref 2.5–4.5)
PLATELET # BLD AUTO: 246 K/UL — SIGNIFICANT CHANGE UP (ref 150–400)
PO2 BLDA: 141 MMHG — HIGH (ref 83–108)
POTASSIUM SERPL-MCNC: 3.8 MMOL/L — SIGNIFICANT CHANGE UP (ref 3.5–5.3)
POTASSIUM SERPL-SCNC: 3.8 MMOL/L — SIGNIFICANT CHANGE UP (ref 3.5–5.3)
PROT SERPL-MCNC: 4.7 G/DL — LOW (ref 6–8.3)
RBC # BLD: 3.83 M/UL — SIGNIFICANT CHANGE UP (ref 3.8–5.2)
RBC # FLD: 12.7 % — SIGNIFICANT CHANGE UP (ref 10.3–14.5)
SAO2 % BLDA: 99.4 % — HIGH (ref 94–98)
SODIUM SERPL-SCNC: 137 MMOL/L — SIGNIFICANT CHANGE UP (ref 135–145)
WBC # BLD: 13.93 K/UL — HIGH (ref 3.8–10.5)
WBC # FLD AUTO: 13.93 K/UL — HIGH (ref 3.8–10.5)

## 2022-09-26 PROCEDURE — 71045 X-RAY EXAM CHEST 1 VIEW: CPT | Mod: 26

## 2022-09-26 PROCEDURE — 76604 US EXAM CHEST: CPT | Mod: 26

## 2022-09-26 PROCEDURE — 76700 US EXAM ABDOM COMPLETE: CPT | Mod: 26

## 2022-09-26 PROCEDURE — 99291 CRITICAL CARE FIRST HOUR: CPT

## 2022-09-26 RX ORDER — POTASSIUM PHOSPHATE, MONOBASIC POTASSIUM PHOSPHATE, DIBASIC 236; 224 MG/ML; MG/ML
30 INJECTION, SOLUTION INTRAVENOUS ONCE
Refills: 0 | Status: COMPLETED | OUTPATIENT
Start: 2022-09-26 | End: 2022-09-26

## 2022-09-26 RX ORDER — ACETAMINOPHEN 500 MG
1000 TABLET ORAL ONCE
Refills: 0 | Status: COMPLETED | OUTPATIENT
Start: 2022-09-26 | End: 2022-09-26

## 2022-09-26 RX ORDER — FENTANYL CITRATE 50 UG/ML
0.5 INJECTION INTRAVENOUS
Qty: 2500 | Refills: 0 | Status: DISCONTINUED | OUTPATIENT
Start: 2022-09-26 | End: 2022-09-27

## 2022-09-26 RX ADMIN — POTASSIUM PHOSPHATE, MONOBASIC POTASSIUM PHOSPHATE, DIBASIC 83.33 MILLIMOLE(S): 236; 224 INJECTION, SOLUTION INTRAVENOUS at 06:30

## 2022-09-26 RX ADMIN — HEPARIN SODIUM 5000 UNIT(S): 5000 INJECTION INTRAVENOUS; SUBCUTANEOUS at 05:19

## 2022-09-26 RX ADMIN — PHENYLEPHRINE HYDROCHLORIDE 14.1 MICROGRAM(S)/KG/MIN: 10 INJECTION INTRAVENOUS at 19:27

## 2022-09-26 RX ADMIN — Medication 4 MILLILITER(S): at 10:35

## 2022-09-26 RX ADMIN — Medication 100 MILLIGRAM(S): at 22:38

## 2022-09-26 RX ADMIN — DEXTROSE MONOHYDRATE, SODIUM CHLORIDE, AND POTASSIUM CHLORIDE 50 MILLILITER(S): 50; .745; 4.5 INJECTION, SOLUTION INTRAVENOUS at 19:27

## 2022-09-26 RX ADMIN — Medication 100 MILLIGRAM(S): at 14:50

## 2022-09-26 RX ADMIN — HEPARIN SODIUM 5000 UNIT(S): 5000 INJECTION INTRAVENOUS; SUBCUTANEOUS at 14:50

## 2022-09-26 RX ADMIN — CHLORHEXIDINE GLUCONATE 15 MILLILITER(S): 213 SOLUTION TOPICAL at 17:27

## 2022-09-26 RX ADMIN — HEPARIN SODIUM 5000 UNIT(S): 5000 INJECTION INTRAVENOUS; SUBCUTANEOUS at 22:37

## 2022-09-26 RX ADMIN — Medication 200 MILLIGRAM(S): at 05:20

## 2022-09-26 RX ADMIN — FENTANYL CITRATE 3.77 MICROGRAM(S)/KG/HR: 50 INJECTION INTRAVENOUS at 19:28

## 2022-09-26 RX ADMIN — Medication 200 MILLIGRAM(S): at 17:27

## 2022-09-26 RX ADMIN — PANTOPRAZOLE SODIUM 40 MILLIGRAM(S): 20 TABLET, DELAYED RELEASE ORAL at 11:57

## 2022-09-26 RX ADMIN — POTASSIUM PHOSPHATE, MONOBASIC POTASSIUM PHOSPHATE, DIBASIC 83.33 MILLIMOLE(S): 236; 224 INJECTION, SOLUTION INTRAVENOUS at 11:58

## 2022-09-26 RX ADMIN — Medication 400 MILLIGRAM(S): at 22:36

## 2022-09-26 RX ADMIN — Medication 2: at 11:57

## 2022-09-26 RX ADMIN — INSULIN GLARGINE 8 UNIT(S): 100 INJECTION, SOLUTION SUBCUTANEOUS at 10:01

## 2022-09-26 RX ADMIN — CHLORHEXIDINE GLUCONATE 1 APPLICATION(S): 213 SOLUTION TOPICAL at 05:20

## 2022-09-26 RX ADMIN — Medication 2: at 05:43

## 2022-09-26 RX ADMIN — Medication 1 DROP(S): at 22:37

## 2022-09-26 RX ADMIN — CHLORHEXIDINE GLUCONATE 15 MILLILITER(S): 213 SOLUTION TOPICAL at 05:20

## 2022-09-26 RX ADMIN — Medication 1000 MILLIGRAM(S): at 22:48

## 2022-09-26 RX ADMIN — Medication 100 MILLIGRAM(S): at 05:19

## 2022-09-26 NOTE — PROGRESS NOTE ADULT - SUBJECTIVE AND OBJECTIVE BOX
24 HOUR EVENTS:  - CXR improved 9/25 AM in setting of reintubation. Resolution of suspected RUL atelectasis with small layering left effusion and ET tube in place.    NEURO  RASS:  -1      Exam: sedated   Meds: acetaminophen   IVPB .. 1000 milliGRAM(s) IV Intermittent every 6 hours  dexMEDEtomidine Infusion 1 MICROgram(s)/kG/Hr IV Continuous <Continuous>  fentaNYL    Injectable 50 MICROGram(s) IV Push once  HYDROmorphone  Injectable 0.5 milliGRAM(s) IV Push every 4 hours PRN Severe Pain (7 - 10)  HYDROmorphone  Injectable 0.25 milliGRAM(s) IV Push every 4 hours PRN Moderate Pain (4 - 6)  midazolam Injectable 2 milliGRAM(s) IV Push once  midazolam Injectable 2 milliGRAM(s) IV Push once  propofol Infusion 20 MICROgram(s)/kG/Min IV Continuous <Continuous>    [x] Adequacy of sedation and pain control has been assessed and adjusted      RESPIRATORY  RR: 16 (09-25-22 @ 02:15) (11 - 38)  SpO2: 97% (09-25-22 @ 02:15) (80% - 100%)  Wt(kg): --  Exam: unlabored, clear to auscultation bilaterally  Mechanical Ventilation: Mode: AC/ CMV (Assist Control/ Continuous Mandatory Ventilation), RR (machine): 16, RR (patient): 16, TV (machine): 350, FiO2: 50, PEEP: 5, ITime: 0.8, MAP: 10, PIP: 20  ABG - ( 25 Sep 2022 01:00 )  pH: 7.32  /  pCO2: 48    /  pO2: 97    / HCO3: 25    / Base Excess: -1.7  /  SaO2: 99.6    Lactate: x                [N/A] Extubation Readiness Assessed  Meds: acetylcysteine 10%  Inhalation 4 milliLiter(s) Inhalation daily  albuterol/ipratropium for Nebulization. 3 milliLiter(s) Nebulizer once        CARDIOVASCULAR  HR: 78 (09-25-22 @ 02:15) (70 - 127)  BP: 127/50 (09-24-22 @ 22:15) (127/50 - 127/50)  BP(mean): 72 (09-24-22 @ 22:15) (72 - 72)  ABP: 115/44 (09-25-22 @ 02:15) (91/40 - 207/87)  ABP(mean): 68 (09-25-22 @ 02:15) (55 - 138)  Wt(kg): --  CVP(cm H2O): --      Exam: regular rate and rhythm  Cardiac Rhythm: sinus  Perfusion     [x]Adequate   [ ]Inadequate  Mentation   [x]Normal       [ ]Reduced  Extremities  [x]Warm         [ ]Cool  Volume Status [ ]Hypervolemic [x]Euvolemic [ ]Hypovolemic  Meds: metoprolol tartrate Injectable 5 milliGRAM(s) IV Push every 6 hours  phenylephrine    Infusion 0.5 MICROgram(s)/kG/Min IV Continuous <Continuous>        GI/NUTRITION  Exam: soft, nondistended, MARGRET x2 SS, Midline incision with dressing CD, NGT LWS   Diet: NPO  Meds: pantoprazole  Injectable 40 milliGRAM(s) IV Push daily      GENITOURINARY  I&O's Detail    09-23 @ 07:01  -  09-24 @ 07:00  --------------------------------------------------------  IN:    IV PiggyBack: 2333.1 mL    Lactated Ringers: 2400 mL  Total IN: 4733.1 mL    OUT:    Dexmedetomidine: 0 mL    Drain (mL): 85 mL    Drain (mL): 155 mL    Indwelling Catheter - Urethral (mL): 2179 mL    Nasogastric/Oral tube (mL): 550 mL    Phenylephrine: 0 mL  Total OUT: 2969 mL    Total NET: 1764.1 mL      09-24 @ 07:01  -  09-25 @ 02:43  --------------------------------------------------------  IN:    Albumin 5%  - 250 mL: 250 mL    IV PiggyBack: 700 mL    Lactated Ringers: 2000 mL  Total IN: 2950 mL    OUT:    Drain (mL): 110 mL    Drain (mL): 1170 mL    Indwelling Catheter - Urethral (mL): 1667 mL    Nasogastric/Oral tube (mL): 500 mL  Total OUT: 3447 mL    Total NET: -497 mL          09-25    136  |  104  |  13  ----------------------------<  122<H>  3.7   |  23  |  0.84    Ca    7.4<L>      25 Sep 2022 01:00  Phos  2.3     09-25  Mg     1.90     09-25    TPro  4.9<L>  /  Alb  2.5<L>  /  TBili  0.8  /  DBili  x   /  AST  27  /  ALT  16  /  AlkPhos  48  09-25    [ ] Melgar catheter, indication: N/A  Meds: lactated ringers Bolus 1000 milliLiter(s) IV Bolus once  lactated ringers. 1000 milliLiter(s) IV Continuous <Continuous>  sodium chloride 0.9% lock flush 10 milliLiter(s) IV Push every 1 hour PRN Pre/post blood products, medications, blood draw, and to maintain line patency        HEMATOLOGIC  Meds: heparin   Injectable 5000 Unit(s) SubCutaneous every 8 hours    [x] VTE Prophylaxis                        10.7   16.69 )-----------( 222      ( 25 Sep 2022 01:00 )             33.5     PT/INR - ( 25 Sep 2022 01:00 )   PT: 17.5 sec;   INR: 1.50 ratio         PTT - ( 25 Sep 2022 01:00 )  PTT:40.1 sec  Transfusion     [ ] PRBC   [ ] Platelets   [ ] FFP   [ ] Cryoprecipitate      INFECTIOUS DISEASES  WBC Count: 16.69 K/uL (09-25 @ 01:00)    RECENT CULTURES:    Meds: ciprofloxacin   IVPB 400 milliGRAM(s) IV Intermittent every 12 hours  metroNIDAZOLE  IVPB      metroNIDAZOLE  IVPB 500 milliGRAM(s) IV Intermittent every 8 hours        ENDOCRINE  CAPILLARY BLOOD GLUCOSE      POCT Blood Glucose.: 109 mg/dL (25 Sep 2022 00:57)  POCT Blood Glucose.: 121 mg/dL (24 Sep 2022 17:17)  POCT Blood Glucose.: 149 mg/dL (24 Sep 2022 12:00)  POCT Blood Glucose.: 127 mg/dL (24 Sep 2022 08:59)  POCT Blood Glucose.: 118 mg/dL (24 Sep 2022 06:26)    Meds: insulin glargine Injectable (LANTUS) 12 Unit(s) SubCutaneous every morning  insulin lispro (ADMELOG) corrective regimen sliding scale   SubCutaneous every 6 hours        ACCESS DEVICES:  [x] Peripheral IV  [ x] Central Venous Line	[x ] R	[ ] L	[ x] IJ	[ ] Fem	[ ] SC	Placed:   [x ] Arterial Line		[ ] R	[x ] L	[ ] Fem	[x ] Rad	[ ] Ax	Placed:   [ ] PICC:					[ ] Mediport  [ ] Urinary Catheter, Date Placed:   [x] Necessity of urinary, arterial, and venous catheters discussed    OTHER MEDICATIONS:  chlorhexidine 0.12% Liquid 15 milliLiter(s) Oral Mucosa every 12 hours  chlorhexidine 4% Liquid 1 Application(s) Topical <User Schedule>  tetracaine/benzocaine/butamben Spray 1 Spray(s) Topical once PRN   24 HOUR EVENTS:  - CXR improved 9/25 AM in setting of reintubation. Resolution of suspected RUL atelectasis with small layering left effusion and ET tube in place.    ICU Vital Signs Last 24 Hrs  T(C): 37.7 (25 Sep 2022 16:00), Max: 38.3 (25 Sep 2022 12:00)  T(F): 99.9 (25 Sep 2022 16:00), Max: 100.9 (25 Sep 2022 12:00)  HR: 58 (25 Sep 2022 23:43) (57 - 120)  BP: --  BP(mean): --  ABP: 113/49 (25 Sep 2022 22:00) (76/36 - 151/59)  ABP(mean): 71 (25 Sep 2022 22:00) (49 - 88)  RR: 18 (25 Sep 2022 22:00) (16 - 24)  SpO2: 100% (25 Sep 2022 23:43) (93% - 100%)    O2 Parameters below as of 25 Sep 2022 21:00  Patient On (Oxygen Delivery Method): ventilator    O2 Concentration (%): 40        NEURO  RASS:  -1      Exam: sedated   Meds: acetaminophen   IVPB .. 1000 milliGRAM(s) IV Intermittent every 6 hours  dexMEDEtomidine Infusion 1 MICROgram(s)/kG/Hr IV Continuous <Continuous>  fentaNYL    Injectable 50 MICROGram(s) IV Push once  HYDROmorphone  Injectable 0.5 milliGRAM(s) IV Push every 4 hours PRN Severe Pain (7 - 10)  HYDROmorphone  Injectable 0.25 milliGRAM(s) IV Push every 4 hours PRN Moderate Pain (4 - 6)  midazolam Injectable 2 milliGRAM(s) IV Push once  midazolam Injectable 2 milliGRAM(s) IV Push once  propofol Infusion 20 MICROgram(s)/kG/Min IV Continuous <Continuous>    [x] Adequacy of sedation and pain control has been assessed and adjusted      RESPIRATORY  Wt(kg): --  Exam: unlabored, clear to auscultation bilaterally  Mechanical Ventilation: Mode: AC/ CMV (Assist Control/ Continuous Mandatory Ventilation), RR (machine): 16, RR (patient): 16, TV (machine): 350, FiO2: 50, PEEP: 5, ITime: 0.8, MAP: 10, PIP: 20  ABG - ( 25 Sep 2022 01:00 )  pH: 7.32  /  pCO2: 48    /  pO2: 97    / HCO3: 25    / Base Excess: -1.7  /  SaO2: 99.6    Lactate: x                [N/A] Extubation Readiness Assessed  Meds: acetylcysteine 10%  Inhalation 4 milliLiter(s) Inhalation daily  albuterol/ipratropium for Nebulization. 3 milliLiter(s) Nebulizer once        CARDIOVASCULAR  Exam: regular rate and rhythm  Cardiac Rhythm: sinus  Perfusion     [x]Adequate   [ ]Inadequate  Mentation   [x]Normal       [ ]Reduced  Extremities  [x]Warm         [ ]Cool  Volume Status [ ]Hypervolemic [x]Euvolemic [ ]Hypovolemic  Meds: metoprolol tartrate Injectable 5 milliGRAM(s) IV Push every 6 hours  phenylephrine    Infusion 0.5 MICROgram(s)/kG/Min IV Continuous <Continuous>        GI/NUTRITION  Exam: soft, nondistended, MARGRET x2 SS, Midline incision with dressing CD, NGT LWS   Diet: NPO  Meds: pantoprazole  Injectable 40 milliGRAM(s) IV Push daily      GENITOURINARY  I&O's Detail    24 Sep 2022 07:01  -  25 Sep 2022 07:00  --------------------------------------------------------  IN:    Albumin 5%  - 250 mL: 250 mL    Dexmedetomidine: 90.4 mL    IV PiggyBack: 1100 mL    Lactated Ringers: 2400 mL    Phenylephrine: 211.7 mL    Propofol: 54.2 mL  Total IN: 4106.3 mL    OUT:    Drain (mL): 130 mL    Drain (mL): 1470 mL    Indwelling Catheter - Urethral (mL): 1877 mL    Nasogastric/Oral tube (mL): 600 mL  Total OUT: 4077 mL    Total NET: 29.3 mL      25 Sep 2022 07:01  -  26 Sep 2022 00:23  --------------------------------------------------------  IN:    Dexmedetomidine: 299.1 mL    dextrose 5% + sodium chloride 0.45% w/ Additives: 500 mL    IV PiggyBack: 100 mL    Lactated Ringers: 1000 mL    Phenylephrine: 127.5 mL  Total IN: 2026.6 mL    OUT:    Drain (mL): 245 mL    Drain (mL): 60 mL    Indwelling Catheter - Urethral (mL): 1035 mL    Nasogastric/Oral tube (mL): 300 mL  Total OUT: 1640 mL    Total NET: 386.6 mL              09-25    136  |  104  |  13  ----------------------------<  122<H>  3.7   |  23  |  0.84    Ca    7.4<L>      25 Sep 2022 01:00  Phos  2.3     09-25  Mg     1.90     09-25    TPro  4.9<L>  /  Alb  2.5<L>  /  TBili  0.8  /  DBili  x   /  AST  27  /  ALT  16  /  AlkPhos  48  09-25    [ ] Melgar catheter, indication: N/A  Meds: lactated ringers Bolus 1000 milliLiter(s) IV Bolus once  lactated ringers. 1000 milliLiter(s) IV Continuous <Continuous>  sodium chloride 0.9% lock flush 10 milliLiter(s) IV Push every 1 hour PRN Pre/post blood products, medications, blood draw, and to maintain line patency        HEMATOLOGIC  Meds: heparin   Injectable 5000 Unit(s) SubCutaneous every 8 hours    [x] VTE Prophylaxis                        10.7   16.69 )-----------( 222      ( 25 Sep 2022 01:00 )             33.5     PT/INR - ( 25 Sep 2022 01:00 )   PT: 17.5 sec;   INR: 1.50 ratio         PTT - ( 25 Sep 2022 01:00 )  PTT:40.1 sec  Transfusion     [ ] PRBC   [ ] Platelets   [ ] FFP   [ ] Cryoprecipitate      INFECTIOUS DISEASES  WBC Count: 16.69 K/uL (09-25 @ 01:00)    RECENT CULTURES:    Meds: ciprofloxacin   IVPB 400 milliGRAM(s) IV Intermittent every 12 hours  metroNIDAZOLE  IVPB      metroNIDAZOLE  IVPB 500 milliGRAM(s) IV Intermittent every 8 hours        ENDOCRINE  CAPILLARY BLOOD GLUCOSE      POCT Blood Glucose.: 109 mg/dL (25 Sep 2022 00:57)  POCT Blood Glucose.: 121 mg/dL (24 Sep 2022 17:17)  POCT Blood Glucose.: 149 mg/dL (24 Sep 2022 12:00)  POCT Blood Glucose.: 127 mg/dL (24 Sep 2022 08:59)  POCT Blood Glucose.: 118 mg/dL (24 Sep 2022 06:26)    Meds: insulin glargine Injectable (LANTUS) 12 Unit(s) SubCutaneous every morning  insulin lispro (ADMELOG) corrective regimen sliding scale   SubCutaneous every 6 hours        ACCESS DEVICES:  [x] Peripheral IV  [ x] Central Venous Line	[x ] R	[ ] L	[ x] IJ	[ ] Fem	[ ] SC	Placed:   [x ] Arterial Line		[ ] R	[x ] L	[ ] Fem	[x ] Rad	[ ] Ax	Placed:   [ ] PICC:					[ ] Mediport  [ ] Urinary Catheter, Date Placed:   [x] Necessity of urinary, arterial, and venous catheters discussed    OTHER MEDICATIONS:  chlorhexidine 0.12% Liquid 15 milliLiter(s) Oral Mucosa every 12 hours  chlorhexidine 4% Liquid 1 Application(s) Topical <User Schedule>  tetracaine/benzocaine/butamben Spray 1 Spray(s) Topical once PRN   24 HOUR EVENTS:  - CXR improved 9/25 AM in setting of reintubation. Resolution of suspected RUL atelectasis with small layering left effusion and ET tube in place  - IVF @50cc/hr  - wean precedex, start fentanyl   - SBTs today  - POCUS     ICU Vital Signs Last 24 Hrs  T(C): 37.7 (25 Sep 2022 16:00), Max: 38.3 (25 Sep 2022 12:00)  T(F): 99.9 (25 Sep 2022 16:00), Max: 100.9 (25 Sep 2022 12:00)  HR: 58 (25 Sep 2022 23:43) (57 - 120)  BP: --  BP(mean): --  ABP: 113/49 (25 Sep 2022 22:00) (76/36 - 151/59)  ABP(mean): 71 (25 Sep 2022 22:00) (49 - 88)  RR: 18 (25 Sep 2022 22:00) (16 - 24)  SpO2: 100% (25 Sep 2022 23:43) (93% - 100%)    O2 Parameters below as of 25 Sep 2022 21:00  Patient On (Oxygen Delivery Method): ventilator    O2 Concentration (%): 40        NEURO  RASS:  -1      Exam: sedated   Meds: acetaminophen   IVPB .. 1000 milliGRAM(s) IV Intermittent every 6 hours  dexMEDEtomidine Infusion 1 MICROgram(s)/kG/Hr IV Continuous <Continuous>  fentaNYL    Injectable 50 MICROGram(s) IV Push once  HYDROmorphone  Injectable 0.5 milliGRAM(s) IV Push every 4 hours PRN Severe Pain (7 - 10)  HYDROmorphone  Injectable 0.25 milliGRAM(s) IV Push every 4 hours PRN Moderate Pain (4 - 6)  midazolam Injectable 2 milliGRAM(s) IV Push once  midazolam Injectable 2 milliGRAM(s) IV Push once  propofol Infusion 20 MICROgram(s)/kG/Min IV Continuous <Continuous>    [x] Adequacy of sedation and pain control has been assessed and adjusted      RESPIRATORY  Wt(kg): --  Exam: unlabored, clear to auscultation bilaterally  Mechanical Ventilation: Mode: AC/ CMV (Assist Control/ Continuous Mandatory Ventilation), RR (machine): 16, RR (patient): 16, TV (machine): 350, FiO2: 50, PEEP: 5, ITime: 0.8, MAP: 10, PIP: 20  ABG - ( 25 Sep 2022 01:00 )  pH: 7.32  /  pCO2: 48    /  pO2: 97    / HCO3: 25    / Base Excess: -1.7  /  SaO2: 99.6    Lactate: x                [N/A] Extubation Readiness Assessed  Meds: acetylcysteine 10%  Inhalation 4 milliLiter(s) Inhalation daily  albuterol/ipratropium for Nebulization. 3 milliLiter(s) Nebulizer once        CARDIOVASCULAR  Exam: regular rate and rhythm  Cardiac Rhythm: sinus  Perfusion     [x]Adequate   [ ]Inadequate  Mentation   [x]Normal       [ ]Reduced  Extremities  [x]Warm         [ ]Cool  Volume Status [ ]Hypervolemic [x]Euvolemic [ ]Hypovolemic  Meds: metoprolol tartrate Injectable 5 milliGRAM(s) IV Push every 6 hours  phenylephrine    Infusion 0.5 MICROgram(s)/kG/Min IV Continuous <Continuous>        GI/NUTRITION  Exam: soft, nondistended, MARGRET x2 SS, Midline incision with dressing CD, NGT LWS   Diet: NPO  Meds: pantoprazole  Injectable 40 milliGRAM(s) IV Push daily      GENITOURINARY  I&O's Detail    24 Sep 2022 07:01  -  25 Sep 2022 07:00  --------------------------------------------------------  IN:    Albumin 5%  - 250 mL: 250 mL    Dexmedetomidine: 90.4 mL    IV PiggyBack: 1100 mL    Lactated Ringers: 2400 mL    Phenylephrine: 211.7 mL    Propofol: 54.2 mL  Total IN: 4106.3 mL    OUT:    Drain (mL): 130 mL    Drain (mL): 1470 mL    Indwelling Catheter - Urethral (mL): 1877 mL    Nasogastric/Oral tube (mL): 600 mL  Total OUT: 4077 mL    Total NET: 29.3 mL      25 Sep 2022 07:01  -  26 Sep 2022 00:23  --------------------------------------------------------  IN:    Dexmedetomidine: 299.1 mL    dextrose 5% + sodium chloride 0.45% w/ Additives: 500 mL    IV PiggyBack: 100 mL    Lactated Ringers: 1000 mL    Phenylephrine: 127.5 mL  Total IN: 2026.6 mL    OUT:    Drain (mL): 245 mL    Drain (mL): 60 mL    Indwelling Catheter - Urethral (mL): 1035 mL    Nasogastric/Oral tube (mL): 300 mL  Total OUT: 1640 mL    Total NET: 386.6 mL              09-25    136  |  104  |  13  ----------------------------<  122<H>  3.7   |  23  |  0.84    Ca    7.4<L>      25 Sep 2022 01:00  Phos  2.3     09-25  Mg     1.90     09-25    TPro  4.9<L>  /  Alb  2.5<L>  /  TBili  0.8  /  DBili  x   /  AST  27  /  ALT  16  /  AlkPhos  48  09-25    [ ] Melgar catheter, indication: N/A  Meds: lactated ringers Bolus 1000 milliLiter(s) IV Bolus once  lactated ringers. 1000 milliLiter(s) IV Continuous <Continuous>  sodium chloride 0.9% lock flush 10 milliLiter(s) IV Push every 1 hour PRN Pre/post blood products, medications, blood draw, and to maintain line patency        HEMATOLOGIC  Meds: heparin   Injectable 5000 Unit(s) SubCutaneous every 8 hours    [x] VTE Prophylaxis                        10.7   16.69 )-----------( 222      ( 25 Sep 2022 01:00 )             33.5     PT/INR - ( 25 Sep 2022 01:00 )   PT: 17.5 sec;   INR: 1.50 ratio         PTT - ( 25 Sep 2022 01:00 )  PTT:40.1 sec  Transfusion     [ ] PRBC   [ ] Platelets   [ ] FFP   [ ] Cryoprecipitate      INFECTIOUS DISEASES  WBC Count: 16.69 K/uL (09-25 @ 01:00)    RECENT CULTURES:    Meds: ciprofloxacin   IVPB 400 milliGRAM(s) IV Intermittent every 12 hours  metroNIDAZOLE  IVPB      metroNIDAZOLE  IVPB 500 milliGRAM(s) IV Intermittent every 8 hours        ENDOCRINE  CAPILLARY BLOOD GLUCOSE      POCT Blood Glucose.: 109 mg/dL (25 Sep 2022 00:57)  POCT Blood Glucose.: 121 mg/dL (24 Sep 2022 17:17)  POCT Blood Glucose.: 149 mg/dL (24 Sep 2022 12:00)  POCT Blood Glucose.: 127 mg/dL (24 Sep 2022 08:59)  POCT Blood Glucose.: 118 mg/dL (24 Sep 2022 06:26)    Meds: insulin glargine Injectable (LANTUS) 12 Unit(s) SubCutaneous every morning  insulin lispro (ADMELOG) corrective regimen sliding scale   SubCutaneous every 6 hours        ACCESS DEVICES:  [x] Peripheral IV  [ x] Central Venous Line	[x ] R	[ ] L	[ x] IJ	[ ] Fem	[ ] SC	Placed:   [x ] Arterial Line		[ ] R	[x ] L	[ ] Fem	[x ] Rad	[ ] Ax	Placed:   [ ] PICC:					[ ] Mediport  [ ] Urinary Catheter, Date Placed:   [x] Necessity of urinary, arterial, and venous catheters discussed    OTHER MEDICATIONS:  chlorhexidine 0.12% Liquid 15 milliLiter(s) Oral Mucosa every 12 hours  chlorhexidine 4% Liquid 1 Application(s) Topical <User Schedule>  tetracaine/benzocaine/butamben Spray 1 Spray(s) Topical once PRN

## 2022-09-26 NOTE — PROGRESS NOTE ADULT - SUBJECTIVE AND OBJECTIVE BOX
TEAM Surgery Progress Note  Patient is a 72y old  Female who presents with a chief complaint of Pancreatic neoplasm (25 Sep 2022 23:44)      INTERVAL EVENTS: Patient is POD# ***No acute events overnight.  SUBJECTIVE: Patient seen and examined at bedside with surgical team, patient without complaints. Denies fever, chills, CP, SOB nausea, vomiting, abdominal pain.    REVIEW OF SYSTEMS:  Constitutional: No fevers or chills. No malaise or weakness.  EENT: No vision changes. No ear pain. No nasal congestion or rhinitis. No throat pain or dysphagia.  Respiratory: No cough, wheezing, or SOB. No hemoptysis.  Cardiovascular: No chest pain or palpitations.  Gastrointestinal: No abdominal pain. No nausea, vomiting, diarrhea or constipation. No hematochezia. No melena.  Genitourinary: No dysuria, hematuria, or frequency.  Neurologic: No numbness or tingling. No weakness.  Skin: No rashes or pruritus.     OBJECTIVE:    Vital Signs Last 24 Hrs  T(C): 37.7 (25 Sep 2022 16:00), Max: 38.3 (25 Sep 2022 12:00)  T(F): 99.9 (25 Sep 2022 16:00), Max: 100.9 (25 Sep 2022 12:00)  HR: 58 (25 Sep 2022 23:43) (57 - 120)  BP: --  BP(mean): --  RR: 18 (25 Sep 2022 22:00) (16 - 24)  SpO2: 100% (25 Sep 2022 23:43) (93% - 100%)    Parameters below as of 25 Sep 2022 21:00  Patient On (Oxygen Delivery Method): ventilator    O2 Concentration (%): 40I&O's Detail    24 Sep 2022 07:01  -  25 Sep 2022 07:00  --------------------------------------------------------  IN:    Albumin 5%  - 250 mL: 250 mL    Dexmedetomidine: 90.4 mL    IV PiggyBack: 1100 mL    Lactated Ringers: 2400 mL    Phenylephrine: 211.7 mL    Propofol: 54.2 mL  Total IN: 4106.3 mL    OUT:    Drain (mL): 130 mL    Drain (mL): 1470 mL    Indwelling Catheter - Urethral (mL): 1877 mL    Nasogastric/Oral tube (mL): 600 mL  Total OUT: 4077 mL    Total NET: 29.3 mL      25 Sep 2022 07:01  -  26 Sep 2022 00:49  --------------------------------------------------------  IN:    Dexmedetomidine: 299.1 mL    dextrose 5% + sodium chloride 0.45% w/ Additives: 500 mL    IV PiggyBack: 100 mL    Lactated Ringers: 1000 mL    Phenylephrine: 127.5 mL  Total IN: 2026.6 mL    OUT:    Drain (mL): 245 mL    Drain (mL): 60 mL    Indwelling Catheter - Urethral (mL): 1035 mL    Nasogastric/Oral tube (mL): 300 mL  Total OUT: 1640 mL    Total NET: 386.6 mL      MEDICATIONS  (STANDING):  acetylcysteine 20%  Inhalation 4 milliLiter(s) Inhalation daily  chlorhexidine 0.12% Liquid 15 milliLiter(s) Oral Mucosa every 12 hours  chlorhexidine 4% Liquid 1 Application(s) Topical <User Schedule>  ciprofloxacin   IVPB 400 milliGRAM(s) IV Intermittent every 12 hours  dexMEDEtomidine Infusion 1 MICROgram(s)/kG/Hr (18.8 mL/Hr) IV Continuous <Continuous>  dextrose 5% + sodium chloride 0.45% with potassium chloride 20 mEq/L 1000 milliLiter(s) (100 mL/Hr) IV Continuous <Continuous>  heparin   Injectable 5000 Unit(s) SubCutaneous every 8 hours  insulin glargine Injectable (LANTUS) 8 Unit(s) SubCutaneous every morning  insulin lispro (ADMELOG) corrective regimen sliding scale   SubCutaneous every 6 hours  metroNIDAZOLE  IVPB      metroNIDAZOLE  IVPB 500 milliGRAM(s) IV Intermittent every 8 hours  pantoprazole  Injectable 40 milliGRAM(s) IV Push daily  phenylephrine    Infusion 0.5 MICROgram(s)/kG/Min (14.1 mL/Hr) IV Continuous <Continuous>    MEDICATIONS  (PRN):  HYDROmorphone  Injectable 0.5 milliGRAM(s) IV Push every 4 hours PRN Severe Pain (7 - 10)  HYDROmorphone  Injectable 0.25 milliGRAM(s) IV Push every 4 hours PRN Moderate Pain (4 - 6)  sodium chloride 0.9% lock flush 10 milliLiter(s) IV Push every 1 hour PRN Pre/post blood products, medications, blood draw, and to maintain line patency      PHYSICAL EXAM:  Constitutional: A&Ox3, NAD  Respiratory: Unlabored breathing  Abdomen: Soft, nondistended, NTTP. No rebound or guarding.  Extremities: WWP, VENEGAS spontaneously    LABS:                        10.7   16.69 )-----------( 222      ( 25 Sep 2022 01:00 )             33.5     09-25    136  |  104  |  13  ----------------------------<  122<H>  3.7   |  23  |  0.84    Ca    7.4<L>      25 Sep 2022 01:00  Phos  2.3     09-25  Mg     1.90     09-25    TPro  4.9<L>  /  Alb  2.5<L>  /  TBili  0.8  /  DBili  x   /  AST  27  /  ALT  16  /  AlkPhos  48  09-25    PT/INR - ( 25 Sep 2022 01:00 )   PT: 17.5 sec;   INR: 1.50 ratio         PTT - ( 25 Sep 2022 01:00 )  PTT:40.1 sec  LIVER FUNCTIONS - ( 25 Sep 2022 01:00 )  Alb: 2.5 g/dL / Pro: 4.9 g/dL / ALK PHOS: 48 U/L / ALT: 16 U/L / AST: 27 U/L / GGT: x                 IMAGING:     TEAM Surgery Progress Note  Patient is a 72y old  Female who presents with a chief complaint of Pancreatic neoplasm (25 Sep 2022 23:44)      INTERVAL EVENTS:   - CXR improved 9/25 AM in setting of reintubation. Resolution of suspected RUL atelectasis with small layering left effusion and ET tube in place.  - 0.3 mcg/kg/min phenylephrine infusion  - MAP>65    SUBJECTIVE: Patient seen and examined at bedside with surgical team, patient without complaints. Denies fever, chills, CP, SOB nausea, vomiting, abdominal pain.    REVIEW OF SYSTEMS:  Constitutional: No fevers or chills. No malaise or weakness.  EENT: No vision changes. No ear pain. No nasal congestion or rhinitis. No throat pain or dysphagia.  Respiratory: No cough, wheezing, or SOB. No hemoptysis.  Cardiovascular: No chest pain or palpitations.  Gastrointestinal: No abdominal pain. No nausea, vomiting, diarrhea or constipation. No hematochezia. No melena.  Genitourinary: No dysuria, hematuria, or frequency.  Neurologic: No numbness or tingling. No weakness.  Skin: No rashes or pruritus.     OBJECTIVE:    Vital Signs Last 24 Hrs  T(C): 37.7 (25 Sep 2022 16:00), Max: 38.3 (25 Sep 2022 12:00)  T(F): 99.9 (25 Sep 2022 16:00), Max: 100.9 (25 Sep 2022 12:00)  HR: 58 (25 Sep 2022 23:43) (57 - 120)  BP: --  BP(mean): --  RR: 18 (25 Sep 2022 22:00) (16 - 24)  SpO2: 100% (25 Sep 2022 23:43) (93% - 100%)    Parameters below as of 25 Sep 2022 21:00  Patient On (Oxygen Delivery Method): ventilator    O2 Concentration (%): 40I&O's Detail    24 Sep 2022 07:01  -  25 Sep 2022 07:00  --------------------------------------------------------  IN:    Albumin 5%  - 250 mL: 250 mL    Dexmedetomidine: 90.4 mL    IV PiggyBack: 1100 mL    Lactated Ringers: 2400 mL    Phenylephrine: 211.7 mL    Propofol: 54.2 mL  Total IN: 4106.3 mL    OUT:    Drain (mL): 130 mL    Drain (mL): 1470 mL    Indwelling Catheter - Urethral (mL): 1877 mL    Nasogastric/Oral tube (mL): 600 mL  Total OUT: 4077 mL    Total NET: 29.3 mL      25 Sep 2022 07:01  -  26 Sep 2022 00:49  --------------------------------------------------------  IN:    Dexmedetomidine: 299.1 mL    dextrose 5% + sodium chloride 0.45% w/ Additives: 500 mL    IV PiggyBack: 100 mL    Lactated Ringers: 1000 mL    Phenylephrine: 127.5 mL  Total IN: 2026.6 mL    OUT:    Drain (mL): 245 mL    Drain (mL): 60 mL    Indwelling Catheter - Urethral (mL): 1035 mL    Nasogastric/Oral tube (mL): 300 mL  Total OUT: 1640 mL    Total NET: 386.6 mL      MEDICATIONS  (STANDING):  acetylcysteine 20%  Inhalation 4 milliLiter(s) Inhalation daily  chlorhexidine 0.12% Liquid 15 milliLiter(s) Oral Mucosa every 12 hours  chlorhexidine 4% Liquid 1 Application(s) Topical <User Schedule>  ciprofloxacin   IVPB 400 milliGRAM(s) IV Intermittent every 12 hours  dexMEDEtomidine Infusion 1 MICROgram(s)/kG/Hr (18.8 mL/Hr) IV Continuous <Continuous>  dextrose 5% + sodium chloride 0.45% with potassium chloride 20 mEq/L 1000 milliLiter(s) (100 mL/Hr) IV Continuous <Continuous>  heparin   Injectable 5000 Unit(s) SubCutaneous every 8 hours  insulin glargine Injectable (LANTUS) 8 Unit(s) SubCutaneous every morning  insulin lispro (ADMELOG) corrective regimen sliding scale   SubCutaneous every 6 hours  metroNIDAZOLE  IVPB      metroNIDAZOLE  IVPB 500 milliGRAM(s) IV Intermittent every 8 hours  pantoprazole  Injectable 40 milliGRAM(s) IV Push daily  phenylephrine    Infusion 0.5 MICROgram(s)/kG/Min (14.1 mL/Hr) IV Continuous <Continuous>    MEDICATIONS  (PRN):  HYDROmorphone  Injectable 0.5 milliGRAM(s) IV Push every 4 hours PRN Severe Pain (7 - 10)  HYDROmorphone  Injectable 0.25 milliGRAM(s) IV Push every 4 hours PRN Moderate Pain (4 - 6)  sodium chloride 0.9% lock flush 10 milliLiter(s) IV Push every 1 hour PRN Pre/post blood products, medications, blood draw, and to maintain line patency      PHYSICAL EXAM:  Constitutional: A&Ox3, NAD  Respiratory: Unlabored breathing  Abdomen: soft, non-distended. Wound - clean- with staples. MARGRET drains with serosanguinous output.  Extremities: WWP, VENEGAS spontaneously    LABS:                        10.7   16.69 )-----------( 222      ( 25 Sep 2022 01:00 )             33.5     09-25    136  |  104  |  13  ----------------------------<  122<H>  3.7   |  23  |  0.84    Ca    7.4<L>      25 Sep 2022 01:00  Phos  2.3     09-25  Mg     1.90     09-25    TPro  4.9<L>  /  Alb  2.5<L>  /  TBili  0.8  /  DBili  x   /  AST  27  /  ALT  16  /  AlkPhos  48  09-25    PT/INR - ( 25 Sep 2022 01:00 )   PT: 17.5 sec;   INR: 1.50 ratio         PTT - ( 25 Sep 2022 01:00 )  PTT:40.1 sec  LIVER FUNCTIONS - ( 25 Sep 2022 01:00 )  Alb: 2.5 g/dL / Pro: 4.9 g/dL / ALK PHOS: 48 U/L / ALT: 16 U/L / AST: 27 U/L / GGT: x                 IMAGING:     A TEAM Surgery Progress Note  Patient is a 72y old  Female who presents with a chief complaint of Pancreatic neoplasm (25 Sep 2022 23:44)      INTERVAL EVENTS:   - CXR improved 9/25 AM in setting of reintubation. Resolution of suspected RUL atelectasis with small layering left effusion and ET tube in place.  - 0.3 mcg/kg/min phenylephrine infusion  - MAP>65    SUBJECTIVE: Patient seen and examined at bedside with surgical team, patient without complaints. Denies fever, chills, CP, SOB nausea, vomiting, abdominal pain.    REVIEW OF SYSTEMS:  Constitutional: No fevers or chills. No malaise or weakness.  EENT: No vision changes. No ear pain. No nasal congestion or rhinitis. No throat pain or dysphagia.  Respiratory: No cough, wheezing, or SOB. No hemoptysis.  Cardiovascular: No chest pain or palpitations.  Gastrointestinal: No abdominal pain. No nausea, vomiting, diarrhea or constipation. No hematochezia. No melena.  Genitourinary: No dysuria, hematuria, or frequency.  Neurologic: No numbness or tingling. No weakness.  Skin: No rashes or pruritus.     OBJECTIVE:    Vital Signs Last 24 Hrs  T(C): 37.7 (25 Sep 2022 16:00), Max: 38.3 (25 Sep 2022 12:00)  T(F): 99.9 (25 Sep 2022 16:00), Max: 100.9 (25 Sep 2022 12:00)  HR: 58 (25 Sep 2022 23:43) (57 - 120)  BP: --  BP(mean): --  RR: 18 (25 Sep 2022 22:00) (16 - 24)  SpO2: 100% (25 Sep 2022 23:43) (93% - 100%)    Parameters below as of 25 Sep 2022 21:00  Patient On (Oxygen Delivery Method): ventilator    O2 Concentration (%): 40I&O's Detail    24 Sep 2022 07:01  -  25 Sep 2022 07:00  --------------------------------------------------------  IN:    Albumin 5%  - 250 mL: 250 mL    Dexmedetomidine: 90.4 mL    IV PiggyBack: 1100 mL    Lactated Ringers: 2400 mL    Phenylephrine: 211.7 mL    Propofol: 54.2 mL  Total IN: 4106.3 mL    OUT:    Drain (mL): 130 mL    Drain (mL): 1470 mL    Indwelling Catheter - Urethral (mL): 1877 mL    Nasogastric/Oral tube (mL): 600 mL  Total OUT: 4077 mL    Total NET: 29.3 mL      25 Sep 2022 07:01  -  26 Sep 2022 00:49  --------------------------------------------------------  IN:    Dexmedetomidine: 299.1 mL    dextrose 5% + sodium chloride 0.45% w/ Additives: 500 mL    IV PiggyBack: 100 mL    Lactated Ringers: 1000 mL    Phenylephrine: 127.5 mL  Total IN: 2026.6 mL    OUT:    Drain (mL): 245 mL    Drain (mL): 60 mL    Indwelling Catheter - Urethral (mL): 1035 mL    Nasogastric/Oral tube (mL): 300 mL  Total OUT: 1640 mL    Total NET: 386.6 mL      MEDICATIONS  (STANDING):  acetylcysteine 20%  Inhalation 4 milliLiter(s) Inhalation daily  chlorhexidine 0.12% Liquid 15 milliLiter(s) Oral Mucosa every 12 hours  chlorhexidine 4% Liquid 1 Application(s) Topical <User Schedule>  ciprofloxacin   IVPB 400 milliGRAM(s) IV Intermittent every 12 hours  dexMEDEtomidine Infusion 1 MICROgram(s)/kG/Hr (18.8 mL/Hr) IV Continuous <Continuous>  dextrose 5% + sodium chloride 0.45% with potassium chloride 20 mEq/L 1000 milliLiter(s) (100 mL/Hr) IV Continuous <Continuous>  heparin   Injectable 5000 Unit(s) SubCutaneous every 8 hours  insulin glargine Injectable (LANTUS) 8 Unit(s) SubCutaneous every morning  insulin lispro (ADMELOG) corrective regimen sliding scale   SubCutaneous every 6 hours  metroNIDAZOLE  IVPB      metroNIDAZOLE  IVPB 500 milliGRAM(s) IV Intermittent every 8 hours  pantoprazole  Injectable 40 milliGRAM(s) IV Push daily  phenylephrine    Infusion 0.5 MICROgram(s)/kG/Min (14.1 mL/Hr) IV Continuous <Continuous>    MEDICATIONS  (PRN):  HYDROmorphone  Injectable 0.5 milliGRAM(s) IV Push every 4 hours PRN Severe Pain (7 - 10)  HYDROmorphone  Injectable 0.25 milliGRAM(s) IV Push every 4 hours PRN Moderate Pain (4 - 6)  sodium chloride 0.9% lock flush 10 milliLiter(s) IV Push every 1 hour PRN Pre/post blood products, medications, blood draw, and to maintain line patency      PHYSICAL EXAM:  Constitutional: A&Ox3, NAD  Respiratory: Unlabored breathing  Abdomen: soft, non-distended. Wound - clean- with staples. MARGRET drains with serosanguinous output.  Extremities: WWP, VENEGAS spontaneously    LABS:                        10.7   16.69 )-----------( 222      ( 25 Sep 2022 01:00 )             33.5     09-25    136  |  104  |  13  ----------------------------<  122<H>  3.7   |  23  |  0.84    Ca    7.4<L>      25 Sep 2022 01:00  Phos  2.3     09-25  Mg     1.90     09-25    TPro  4.9<L>  /  Alb  2.5<L>  /  TBili  0.8  /  DBili  x   /  AST  27  /  ALT  16  /  AlkPhos  48  09-25    PT/INR - ( 25 Sep 2022 01:00 )   PT: 17.5 sec;   INR: 1.50 ratio         PTT - ( 25 Sep 2022 01:00 )  PTT:40.1 sec  LIVER FUNCTIONS - ( 25 Sep 2022 01:00 )  Alb: 2.5 g/dL / Pro: 4.9 g/dL / ALK PHOS: 48 U/L / ALT: 16 U/L / AST: 27 U/L / GGT: x                 IMAGING:     A TEAM Surgery Progress Note  Patient is a 72y old  Female who presents with a chief complaint of Pancreatic neoplasm (25 Sep 2022 23:44)      INTERVAL EVENTS:   - CXR improved 9/25 AM in setting of reintubation. Resolution of suspected RUL atelectasis with small layering left effusion and ET tube in place.  - 0.3 mcg/kg/min phenylephrine infusion  - MAP>65    SUBJECTIVE: Patient seen and examined at bedside with surgical team, patient without complaints. Denies fever, chills, CP, SOB nausea, vomiting, abdominal pain.      OBJECTIVE:    Vital Signs Last 24 Hrs  T(C): 37.7 (25 Sep 2022 16:00), Max: 38.3 (25 Sep 2022 12:00)  T(F): 99.9 (25 Sep 2022 16:00), Max: 100.9 (25 Sep 2022 12:00)  HR: 58 (25 Sep 2022 23:43) (57 - 120)  BP: --  BP(mean): --  RR: 18 (25 Sep 2022 22:00) (16 - 24)  SpO2: 100% (25 Sep 2022 23:43) (93% - 100%)    Parameters below as of 25 Sep 2022 21:00  Patient On (Oxygen Delivery Method): ventilator    O2 Concentration (%): 40I&O's Detail    24 Sep 2022 07:01  -  25 Sep 2022 07:00  --------------------------------------------------------  IN:    Albumin 5%  - 250 mL: 250 mL    Dexmedetomidine: 90.4 mL    IV PiggyBack: 1100 mL    Lactated Ringers: 2400 mL    Phenylephrine: 211.7 mL    Propofol: 54.2 mL  Total IN: 4106.3 mL    OUT:    Drain (mL): 130 mL    Drain (mL): 1470 mL    Indwelling Catheter - Urethral (mL): 1877 mL    Nasogastric/Oral tube (mL): 600 mL  Total OUT: 4077 mL    Total NET: 29.3 mL      25 Sep 2022 07:01  -  26 Sep 2022 00:49  --------------------------------------------------------  IN:    Dexmedetomidine: 299.1 mL    dextrose 5% + sodium chloride 0.45% w/ Additives: 500 mL    IV PiggyBack: 100 mL    Lactated Ringers: 1000 mL    Phenylephrine: 127.5 mL  Total IN: 2026.6 mL    OUT:    Drain (mL): 245 mL    Drain (mL): 60 mL    Indwelling Catheter - Urethral (mL): 1035 mL    Nasogastric/Oral tube (mL): 300 mL  Total OUT: 1640 mL    Total NET: 386.6 mL      MEDICATIONS  (STANDING):  acetylcysteine 20%  Inhalation 4 milliLiter(s) Inhalation daily  chlorhexidine 0.12% Liquid 15 milliLiter(s) Oral Mucosa every 12 hours  chlorhexidine 4% Liquid 1 Application(s) Topical <User Schedule>  ciprofloxacin   IVPB 400 milliGRAM(s) IV Intermittent every 12 hours  dexMEDEtomidine Infusion 1 MICROgram(s)/kG/Hr (18.8 mL/Hr) IV Continuous <Continuous>  dextrose 5% + sodium chloride 0.45% with potassium chloride 20 mEq/L 1000 milliLiter(s) (100 mL/Hr) IV Continuous <Continuous>  heparin   Injectable 5000 Unit(s) SubCutaneous every 8 hours  insulin glargine Injectable (LANTUS) 8 Unit(s) SubCutaneous every morning  insulin lispro (ADMELOG) corrective regimen sliding scale   SubCutaneous every 6 hours  metroNIDAZOLE  IVPB      metroNIDAZOLE  IVPB 500 milliGRAM(s) IV Intermittent every 8 hours  pantoprazole  Injectable 40 milliGRAM(s) IV Push daily  phenylephrine    Infusion 0.5 MICROgram(s)/kG/Min (14.1 mL/Hr) IV Continuous <Continuous>    MEDICATIONS  (PRN):  HYDROmorphone  Injectable 0.5 milliGRAM(s) IV Push every 4 hours PRN Severe Pain (7 - 10)  HYDROmorphone  Injectable 0.25 milliGRAM(s) IV Push every 4 hours PRN Moderate Pain (4 - 6)  sodium chloride 0.9% lock flush 10 milliLiter(s) IV Push every 1 hour PRN Pre/post blood products, medications, blood draw, and to maintain line patency      PHYSICAL EXAM:  Constitutional: A&Ox3, NAD  Respiratory: Unlabored breathing  Abdomen: soft, non-distended. Wound - clean- with staples. MARGRET drains with serosanguinous output.  Extremities: WWP, VENEGAS spontaneously    LABS:                        10.7   16.69 )-----------( 222      ( 25 Sep 2022 01:00 )             33.5     09-25    136  |  104  |  13  ----------------------------<  122<H>  3.7   |  23  |  0.84    Ca    7.4<L>      25 Sep 2022 01:00  Phos  2.3     09-25  Mg     1.90     09-25    TPro  4.9<L>  /  Alb  2.5<L>  /  TBili  0.8  /  DBili  x   /  AST  27  /  ALT  16  /  AlkPhos  48  09-25    PT/INR - ( 25 Sep 2022 01:00 )   PT: 17.5 sec;   INR: 1.50 ratio         PTT - ( 25 Sep 2022 01:00 )  PTT:40.1 sec  LIVER FUNCTIONS - ( 25 Sep 2022 01:00 )  Alb: 2.5 g/dL / Pro: 4.9 g/dL / ALK PHOS: 48 U/L / ALT: 16 U/L / AST: 27 U/L / GGT: x                 IMAGING:

## 2022-09-26 NOTE — PROGRESS NOTE ADULT - ASSESSMENT
72F w/ diagnosis of pancreatic cancer on endoscopy, s/p exploratory laparotomy with Whipple procedure, and placement of drains R posterior and L anterior to the anastomoses on 9/23. Drain amylase downtrending.    Plan:   - NPO/IVF hydration  - IV abx: Cipro/Flagyl  - PRN analgesia  - Wean sedation/vent per SICU  - Continue hernandez  - Monitor I&Os  - Monitor MARGRET drain output  - Monitor bowel function  - f/u AM labs  - f/u drain amylase  - DVT ppx  - Appreciate excellent care per SICU      [facility]  P #[id number] 72F w/ diagnosis of pancreatic cancer on endoscopy, s/p exploratory laparotomy with Whipple procedure, and placement of drains R posterior and L anterior to the anastomoses on 9/23. Drain amylase downtrending.    Plan:   - NPO/IVF hydration  - IV abx: Cipro/Flagyl  - PRN analgesia  - Wean sedation/vent per SICU  - Continue hernandez  - Monitor I&Os  - Monitor MARGRET 1 and 2 drains output  - Monitor bowel function  - f/u AM labs  - f/u drain amylase  - DVT ppx  - Appreciate excellent care per SICU      [facility]  P #[id number]

## 2022-09-26 NOTE — CHART NOTE - NSCHARTNOTEFT_GEN_A_CORE
Bedside ultrasound:    Left lung with no evidence of pneumothorax, minimal B lines WNL. Lung sliding visualized. No loculations or effusions visualized. Bedside ultrasound:    Left lung with no evidence of pneumothorax, minimal B lines WNL. Lung sliding visualized. No loculations or effusions visualized.    IVC visualized and 1.8cm diameter. Bedside ultrasound:  Bilateral lungs with no evidence of pneumothorax, A lines visible. Lung sliding visualized. No loculations or effusions visualized.    IVC visualized and 1.8cm diameter.

## 2022-09-26 NOTE — PROGRESS NOTE ADULT - SUBJECTIVE AND OBJECTIVE BOX
Surgery Progress Note     Patient is a 72y old  Female who presents with a chief complaint of Pancreatic neoplasm (25 Sep 2022 23:44)      HPI:  This is a 73 y/o female whose native language is [place] Creole; comprehends and verbalizes minimal English. Refused hospital ; requested sister Mariaa function as . Patient   presents with diagnosis of pancreatic cancer on endoscopy biopsy. Subsequent CT scan and PET scan confirm pathology. Scheduled for whipple procedure pancreatic cancer (20 Sep 2022 07:10)      PAST MEDICAL & SURGICAL HISTORY:  Language barrier  native language [place] Creole; comprehends and verbzlizes minimal English      Type 2 diabetes mellitus      Anxiety and depression      Hypertension      Pancreatic cancer  September 2022      Uterine fibroid  hysterectomyu 1988      H/O jaundice  biliary stent in place          Physical Exam:    Vital Signs Last 24 Hrs  T(C): 37.7 (26 Sep 2022 12:00), Max: 37.8 (26 Sep 2022 08:00)  T(F): 99.8 (26 Sep 2022 12:00), Max: 100 (26 Sep 2022 08:00)  HR: 81 (26 Sep 2022 17:00) (55 - 83)  BP: 123/53 (26 Sep 2022 17:00) (123/53 - 123/53)  BP(mean): 73 (26 Sep 2022 17:00) (73 - 73)  RR: 19 (26 Sep 2022 17:00) (17 - 27)  SpO2: 100% (26 Sep 2022 17:00) (98% - 100%)    Parameters below as of 26 Sep 2022 16:00  Patient On (Oxygen Delivery Method): ventilator,CPAP                   On vent .Being Weaned off.      General appearance:  awake. Responds appropriately.        Drainage Min    Labs:                          11.2   13.93 )-----------( 246      ( 26 Sep 2022 01:15 )             34.3     09-26    137  |  107  |  16  ----------------------------<  155<H>  3.8   |  21<L>  |  0.73    Ca    7.1<L>      26 Sep 2022 01:15  Phos  1.7     09-26  Mg     2.50     09-26    TPro  4.7<L>  /  Alb  2.0<L>  /  TBili  0.8  /  DBili  0.4<H>  /  AST  19  /  ALT  14  /  AlkPhos  54  09-26          Assessment and Plan:

## 2022-09-26 NOTE — PROGRESS NOTE ADULT - ASSESSMENT
71 y/o female s/p Whipple 2/2 pancreatic cancer recovering in SICU post operatively. Extubated afternoon 09/24, reintubated o/n 09/24 2/2 hypoxic respiratory failure.     PLAN  NEUROLOGIC   - Pain control: Tylenol  - Precedex  - Dilaudid PRN  - Wean sedation as tolerated       RESPIRATORY   - Extubated 09/24 afternoon, reintubated 09/24 o/n  - Hypoxic respiratory failure w CXR demonstrating L lung white out  - Bronchoscopy-L Bronchi obstructing mucus plug  - AC 18/400/5/50  - CPAP trials, extubate if tolerating  - Mucomyst, Duoneb  - Resolution of atelectasis on CXR 9/25    CARDIOVASCULAR   - MAP> 65  - q1 Vitals   - Wean cem as tolerated     GASTROINTESTINAL   - Diet: NPO, NGT   - Monitor bowel function  - Monitor drain output  - Drain amylases  - NOT on Whipple pathway    /RENAL   - IV fluids: LR @ 100 cc/hr  - Strict I/Os  - Monitor BMP qd  - Maintain hernandez catheter, strict Is/Os  - Monitor electrolytes, replete PRN    HEMATOLOGIC  - Monitor H/H   - DVT ppx SQH    INFECTIOUS DISEASE  - Monitor fever / WBC  - C/W Cipro and Flagyl x7d per primary team    ENDOCRINE  - Monitor gluc    LINES  - RIJ Triple Lumen CVC   - A line L radial  - Hernandez  - PIV     DISPO: SICU   73 y/o female s/p Whipple 2/2 pancreatic cancer recovering in SICU post operatively. Extubated afternoon 09/24, reintubated o/n 09/24 2/2 hypoxic respiratory failure.     PLAN  NEUROLOGIC   - Pain control: Tylenol  - wean precedex, start fent   - Dilaudid PRN  - Wean sedation as tolerated     RESPIRATORY   - Extubated 09/24 afternoon, reintubated 09/24 due to Hypoxic respiratory failure w CXR demonstrating L lung white out. S/p Bronchoscopy  - AC 18/400/8/50  - CPAP trials, extubate if tolerating  - Mucomyst, Duoneb    CARDIOVASCULAR   - MAP> 65  - q1 Vitals   - Wean cem as tolerated     GASTROINTESTINAL   - Diet: NPO, NGT   - Monitor bowel function  - Monitor drain output  - Drain amylases  - NOT on Whipple pathway    /RENAL   - IV fluids: LR @ 50 cc/hr  - Strict I/Os  - Monitor BMP qd  - Maintain hernandez catheter, strict Is/Os  - Monitor electrolytes, replete PRN    HEMATOLOGIC  - Monitor H/H   - DVT ppx SQH    INFECTIOUS DISEASE  - Monitor fever / WBC  - C/W Cipro and Flagyl x7d per primary team    ENDOCRINE  - Monitor gluc    LINES  - RIJ Triple Lumen CVC   - A line L radial  - Hernandez  - PIV     DISPO: SICU

## 2022-09-27 LAB
ALBUMIN SERPL ELPH-MCNC: 1.9 G/DL — LOW (ref 3.3–5)
ALP SERPL-CCNC: 62 U/L — SIGNIFICANT CHANGE UP (ref 40–120)
ALT FLD-CCNC: 10 U/L — SIGNIFICANT CHANGE UP (ref 4–33)
AMYLASE FLD-CCNC: 1308 U/L — SIGNIFICANT CHANGE UP
AMYLASE FLD-CCNC: 324 U/L — SIGNIFICANT CHANGE UP
ANION GAP SERPL CALC-SCNC: 10 MMOL/L — SIGNIFICANT CHANGE UP (ref 7–14)
APTT BLD: 32.7 SEC — SIGNIFICANT CHANGE UP (ref 27–36.3)
AST SERPL-CCNC: 14 U/L — SIGNIFICANT CHANGE UP (ref 4–32)
BILIRUB SERPL-MCNC: 0.7 MG/DL — SIGNIFICANT CHANGE UP (ref 0.2–1.2)
BLOOD GAS ARTERIAL - LYTES,HGB,ICA,LACT RESULT: SIGNIFICANT CHANGE UP
BUN SERPL-MCNC: 19 MG/DL — SIGNIFICANT CHANGE UP (ref 7–23)
CALCIUM SERPL-MCNC: 7.1 MG/DL — LOW (ref 8.4–10.5)
CHLORIDE SERPL-SCNC: 112 MMOL/L — HIGH (ref 98–107)
CO2 SERPL-SCNC: 18 MMOL/L — LOW (ref 22–31)
CREAT SERPL-MCNC: 0.78 MG/DL — SIGNIFICANT CHANGE UP (ref 0.5–1.3)
EGFR: 81 ML/MIN/1.73M2 — SIGNIFICANT CHANGE UP
GLUCOSE BLDC GLUCOMTR-MCNC: 107 MG/DL — HIGH (ref 70–99)
GLUCOSE BLDC GLUCOMTR-MCNC: 131 MG/DL — HIGH (ref 70–99)
GLUCOSE BLDC GLUCOMTR-MCNC: 140 MG/DL — HIGH (ref 70–99)
GLUCOSE BLDC GLUCOMTR-MCNC: 146 MG/DL — HIGH (ref 70–99)
GLUCOSE BLDC GLUCOMTR-MCNC: 94 MG/DL — SIGNIFICANT CHANGE UP (ref 70–99)
GLUCOSE SERPL-MCNC: 120 MG/DL — HIGH (ref 70–99)
HCT VFR BLD CALC: 31.1 % — LOW (ref 34.5–45)
HGB BLD-MCNC: 9.9 G/DL — LOW (ref 11.5–15.5)
INR BLD: 1.45 RATIO — HIGH (ref 0.88–1.16)
MAGNESIUM SERPL-MCNC: 2.6 MG/DL — SIGNIFICANT CHANGE UP (ref 1.6–2.6)
MCHC RBC-ENTMCNC: 29.4 PG — SIGNIFICANT CHANGE UP (ref 27–34)
MCHC RBC-ENTMCNC: 31.8 GM/DL — LOW (ref 32–36)
MCV RBC AUTO: 92.3 FL — SIGNIFICANT CHANGE UP (ref 80–100)
NRBC # BLD: 0 /100 WBCS — SIGNIFICANT CHANGE UP (ref 0–0)
NRBC # FLD: 0 K/UL — SIGNIFICANT CHANGE UP (ref 0–0)
PHOSPHATE SERPL-MCNC: 1.8 MG/DL — LOW (ref 2.5–4.5)
PLATELET # BLD AUTO: 280 K/UL — SIGNIFICANT CHANGE UP (ref 150–400)
POTASSIUM SERPL-MCNC: 4.1 MMOL/L — SIGNIFICANT CHANGE UP (ref 3.5–5.3)
POTASSIUM SERPL-SCNC: 4.1 MMOL/L — SIGNIFICANT CHANGE UP (ref 3.5–5.3)
PROT SERPL-MCNC: 4.5 G/DL — LOW (ref 6–8.3)
PROTHROM AB SERPL-ACNC: 16.9 SEC — HIGH (ref 10.5–13.4)
RBC # BLD: 3.37 M/UL — LOW (ref 3.8–5.2)
RBC # FLD: 13 % — SIGNIFICANT CHANGE UP (ref 10.3–14.5)
SODIUM SERPL-SCNC: 140 MMOL/L — SIGNIFICANT CHANGE UP (ref 135–145)
WBC # BLD: 16.16 K/UL — HIGH (ref 3.8–10.5)
WBC # FLD AUTO: 16.16 K/UL — HIGH (ref 3.8–10.5)

## 2022-09-27 PROCEDURE — 71045 X-RAY EXAM CHEST 1 VIEW: CPT | Mod: 26

## 2022-09-27 PROCEDURE — 99291 CRITICAL CARE FIRST HOUR: CPT

## 2022-09-27 RX ORDER — FUROSEMIDE 40 MG
40 TABLET ORAL ONCE
Refills: 0 | Status: COMPLETED | OUTPATIENT
Start: 2022-09-27 | End: 2022-09-27

## 2022-09-27 RX ORDER — RISPERIDONE 4 MG/1
2 TABLET ORAL AT BEDTIME
Refills: 0 | Status: DISCONTINUED | OUTPATIENT
Start: 2022-09-27 | End: 2022-10-06

## 2022-09-27 RX ORDER — POTASSIUM PHOSPHATE, MONOBASIC POTASSIUM PHOSPHATE, DIBASIC 236; 224 MG/ML; MG/ML
15 INJECTION, SOLUTION INTRAVENOUS ONCE
Refills: 0 | Status: COMPLETED | OUTPATIENT
Start: 2022-09-27 | End: 2022-09-27

## 2022-09-27 RX ORDER — FUROSEMIDE 40 MG
40 TABLET ORAL ONCE
Refills: 0 | Status: DISCONTINUED | OUTPATIENT
Start: 2022-09-27 | End: 2022-09-27

## 2022-09-27 RX ORDER — CALCIUM GLUCONATE 100 MG/ML
1 VIAL (ML) INTRAVENOUS ONCE
Refills: 0 | Status: COMPLETED | OUTPATIENT
Start: 2022-09-27 | End: 2022-09-27

## 2022-09-27 RX ORDER — ACETAMINOPHEN 500 MG
1000 TABLET ORAL EVERY 6 HOURS
Refills: 0 | Status: COMPLETED | OUTPATIENT
Start: 2022-09-27 | End: 2022-09-28

## 2022-09-27 RX ORDER — RISPERIDONE 4 MG/1
2 TABLET ORAL ONCE
Refills: 0 | Status: COMPLETED | OUTPATIENT
Start: 2022-09-27 | End: 2022-09-27

## 2022-09-27 RX ORDER — METOPROLOL TARTRATE 50 MG
5 TABLET ORAL EVERY 6 HOURS
Refills: 0 | Status: DISCONTINUED | OUTPATIENT
Start: 2022-09-27 | End: 2022-09-28

## 2022-09-27 RX ADMIN — Medication 4 MILLILITER(S): at 08:10

## 2022-09-27 RX ADMIN — DEXTROSE MONOHYDRATE, SODIUM CHLORIDE, AND POTASSIUM CHLORIDE 50 MILLILITER(S): 50; .745; 4.5 INJECTION, SOLUTION INTRAVENOUS at 08:54

## 2022-09-27 RX ADMIN — HEPARIN SODIUM 5000 UNIT(S): 5000 INJECTION INTRAVENOUS; SUBCUTANEOUS at 21:48

## 2022-09-27 RX ADMIN — HYDROMORPHONE HYDROCHLORIDE 0.5 MILLIGRAM(S): 2 INJECTION INTRAMUSCULAR; INTRAVENOUS; SUBCUTANEOUS at 20:40

## 2022-09-27 RX ADMIN — CHLORHEXIDINE GLUCONATE 15 MILLILITER(S): 213 SOLUTION TOPICAL at 05:11

## 2022-09-27 RX ADMIN — Medication 5 MILLIGRAM(S): at 18:08

## 2022-09-27 RX ADMIN — HYDROMORPHONE HYDROCHLORIDE 0.5 MILLIGRAM(S): 2 INJECTION INTRAMUSCULAR; INTRAVENOUS; SUBCUTANEOUS at 16:19

## 2022-09-27 RX ADMIN — Medication 400 MILLIGRAM(S): at 11:07

## 2022-09-27 RX ADMIN — POTASSIUM PHOSPHATE, MONOBASIC POTASSIUM PHOSPHATE, DIBASIC 62.5 MILLIMOLE(S): 236; 224 INJECTION, SOLUTION INTRAVENOUS at 05:11

## 2022-09-27 RX ADMIN — HEPARIN SODIUM 5000 UNIT(S): 5000 INJECTION INTRAVENOUS; SUBCUTANEOUS at 14:17

## 2022-09-27 RX ADMIN — Medication 100 MILLIGRAM(S): at 05:10

## 2022-09-27 RX ADMIN — Medication 40 MILLIGRAM(S): at 16:50

## 2022-09-27 RX ADMIN — Medication 1000 MILLIGRAM(S): at 18:00

## 2022-09-27 RX ADMIN — Medication 5 MILLIGRAM(S): at 08:54

## 2022-09-27 RX ADMIN — PANTOPRAZOLE SODIUM 40 MILLIGRAM(S): 20 TABLET, DELAYED RELEASE ORAL at 11:08

## 2022-09-27 RX ADMIN — Medication 1000 MILLIGRAM(S): at 12:00

## 2022-09-27 RX ADMIN — Medication 1 DROP(S): at 17:24

## 2022-09-27 RX ADMIN — RISPERIDONE 2 MILLIGRAM(S): 4 TABLET ORAL at 10:10

## 2022-09-27 RX ADMIN — CHLORHEXIDINE GLUCONATE 1 APPLICATION(S): 213 SOLUTION TOPICAL at 05:11

## 2022-09-27 RX ADMIN — Medication 200 MILLIGRAM(S): at 05:11

## 2022-09-27 RX ADMIN — DEXMEDETOMIDINE HYDROCHLORIDE IN 0.9% SODIUM CHLORIDE 18.8 MICROGRAM(S)/KG/HR: 4 INJECTION INTRAVENOUS at 08:54

## 2022-09-27 RX ADMIN — HEPARIN SODIUM 5000 UNIT(S): 5000 INJECTION INTRAVENOUS; SUBCUTANEOUS at 05:11

## 2022-09-27 RX ADMIN — Medication 40 MILLIGRAM(S): at 09:17

## 2022-09-27 RX ADMIN — RISPERIDONE 2 MILLIGRAM(S): 4 TABLET ORAL at 22:38

## 2022-09-27 RX ADMIN — Medication 100 GRAM(S): at 08:54

## 2022-09-27 RX ADMIN — HYDROMORPHONE HYDROCHLORIDE 0.5 MILLIGRAM(S): 2 INJECTION INTRAMUSCULAR; INTRAVENOUS; SUBCUTANEOUS at 20:20

## 2022-09-27 RX ADMIN — Medication 400 MILLIGRAM(S): at 17:23

## 2022-09-27 RX ADMIN — Medication 1 DROP(S): at 05:12

## 2022-09-27 RX ADMIN — HYDROMORPHONE HYDROCHLORIDE 0.5 MILLIGRAM(S): 2 INJECTION INTRAMUSCULAR; INTRAVENOUS; SUBCUTANEOUS at 16:36

## 2022-09-27 RX ADMIN — CHLORHEXIDINE GLUCONATE 15 MILLILITER(S): 213 SOLUTION TOPICAL at 17:23

## 2022-09-27 NOTE — PROGRESS NOTE ADULT - SUBJECTIVE AND OBJECTIVE BOX
SICU DAILY PROGRESS NOTE    71 y/o female s/p Whipple 2/2 pancreatic cancer recovering in SICU post operatively. Extubated afternoon 09/24, reintubated o/n 09/24 2/2 hypoxic respiratory failure.     24 HOUR EVENTS:  - IVF to 50cc/hr  - Precedex d/renaldo in favor of fent/cem  - POCUS- IVC 1.8 cm with minimal b lines  - CPAP 5/5/30%    SUBJECTIVE/ROS:  [ ] A ten-point review of systems was otherwise negative except as noted.  [x] Due to altered mental status/intubation, subjective information were not able to be obtained from the patient. History was obtained, to the extent possible, from review of the chart and collateral sources of information.      NEURO  Exam: intubated, sedated  Meds: dexMEDEtomidine Infusion 1 MICROgram(s)/kG/Hr IV Continuous <Continuous>  fentaNYL   Infusion 0.5 MICROgram(s)/kG/Hr IV Continuous <Continuous>  HYDROmorphone  Injectable 0.5 milliGRAM(s) IV Push every 4 hours PRN Severe Pain (7 - 10)  HYDROmorphone  Injectable 0.25 milliGRAM(s) IV Push every 4 hours PRN Moderate Pain (4 - 6)    [x] Adequacy of sedation and pain control has been assessed and adjusted      RESPIRATORY  RR: 15 (09-26-22 @ 23:00) (15 - 27)  SpO2: 100% (09-26-22 @ 23:33) (98% - 100%)  Wt(kg): --  Exam: unlabored, intubated  Mechanical Ventilation: Mode: CPAP with PS, RR (patient): 22, FiO2: 40, PEEP: 5, PS: 5, MAP: 7, PIP: 13  ABG - ( 26 Sep 2022 15:25 )  pH: 7.41  /  pCO2: 33    /  pO2: 141   / HCO3: 21    / Base Excess: -2.9  /  SaO2: 99.4    Lactate: x        [N/A] Extubation Readiness Assessed  Meds: acetylcysteine 20%  Inhalation 4 milliLiter(s) Inhalation daily    CARDIOVASCULAR  HR: 90 (09-26-22 @ 23:33) (55 - 97)  BP: 132/51 (09-26-22 @ 18:00) (123/53 - 132/51)  BP(mean): 74 (09-26-22 @ 18:00) (73 - 74)  ABP: 112/45 (09-26-22 @ 23:00) (73/60 - 132/62)  ABP(mean): 65 (09-26-22 @ 23:00) (58 - 86)  Wt(kg): --  CVP(cm H2O): --      Exam: regular rate and rhythm  Cardiac Rhythm: sinus  Perfusion     [x]Adequate   [ ]Inadequate  Mentation   [x]Normal       [ ]Reduced  Extremities  [x]Warm         [ ]Cool  Volume Status [ ]Hypervolemic [x]Euvolemic [ ]Hypovolemic  Meds: phenylephrine    Infusion 0.5 MICROgram(s)/kG/Min IV Continuous <Continuous>        GI/NUTRITION  Exam: soft, nontender, nondistended, incision C/D/I  Diet:  Meds: pantoprazole  Injectable 40 milliGRAM(s) IV Push daily      GENITOURINARY  I&O's Detail    09-25 @ 07:01  -  09-26 @ 07:00  --------------------------------------------------------  IN:    Dexmedetomidine: 468.3 mL    dextrose 5% + sodium chloride 0.45% w/ Additives: 1400 mL    IV PiggyBack: 400 mL    Lactated Ringers: 1000 mL    Phenylephrine: 200.4 mL  Total IN: 3468.7 mL    OUT:    Drain (mL): 75 mL    Drain (mL): 280 mL    Indwelling Catheter - Urethral (mL): 1595 mL    Nasogastric/Oral tube (mL): 400 mL  Total OUT: 2350 mL    Total NET: 1118.7 mL      09-26 @ 07:01  -  09-27 @ 00:42  --------------------------------------------------------  IN:    Dexmedetomidine: 37.6 mL    dextrose 5% + sodium chloride 0.45% w/ Additives: 900 mL    FentaNYL: 41.1 mL    Phenylephrine: 208.1 mL  Total IN: 1186.8 mL    OUT:    Drain (mL): 15 mL    Drain (mL): 770 mL    Indwelling Catheter - Urethral (mL): 1000 mL    Nasogastric/Oral tube (mL): 200 mL  Total OUT: 1985 mL    Total NET: -798.2 mL          09-26    137  |  107  |  16  ----------------------------<  155<H>  3.8   |  21<L>  |  0.73    Ca    7.1<L>      26 Sep 2022 01:15  Phos  1.7     09-26  Mg     2.50     09-26    TPro  4.7<L>  /  Alb  2.0<L>  /  TBili  0.8  /  DBili  0.4<H>  /  AST  19  /  ALT  14  /  AlkPhos  54  09-26    [ ] Melgar catheter, indication: N/A  Meds: dextrose 5% + sodium chloride 0.45% with potassium chloride 20 mEq/L 1000 milliLiter(s) IV Continuous <Continuous>  sodium chloride 0.9% lock flush 10 milliLiter(s) IV Push every 1 hour PRN Pre/post blood products, medications, blood draw, and to maintain line patency        HEMATOLOGIC  Meds: heparin   Injectable 5000 Unit(s) SubCutaneous every 8 hours    [x] VTE Prophylaxis                        11.2   13.93 )-----------( 246      ( 26 Sep 2022 01:15 )             34.3     PT/INR - ( 25 Sep 2022 01:00 )   PT: 17.5 sec;   INR: 1.50 ratio         PTT - ( 25 Sep 2022 01:00 )  PTT:40.1 sec  Transfusion     [ ] PRBC   [ ] Platelets   [ ] FFP   [ ] Cryoprecipitate      INFECTIOUS DISEASES  WBC Count: 13.93 K/uL (09-26 @ 01:15)    RECENT CULTURES:    Meds: ciprofloxacin   IVPB 400 milliGRAM(s) IV Intermittent every 12 hours  metroNIDAZOLE  IVPB      metroNIDAZOLE  IVPB 500 milliGRAM(s) IV Intermittent every 8 hours        ENDOCRINE  CAPILLARY BLOOD GLUCOSE      POCT Blood Glucose.: 105 mg/dL (26 Sep 2022 23:37)  POCT Blood Glucose.: 116 mg/dL (26 Sep 2022 17:25)  POCT Blood Glucose.: 156 mg/dL (26 Sep 2022 11:26)  POCT Blood Glucose.: 152 mg/dL (26 Sep 2022 05:32)    Meds: insulin glargine Injectable (LANTUS) 8 Unit(s) SubCutaneous every morning  insulin lispro (ADMELOG) corrective regimen sliding scale   SubCutaneous every 6 hours        ACCESS DEVICES:  [ ] Peripheral IV  [ ] Central Venous Line	[ ] R	[ ] L	[ ] IJ	[ ] Fem	[ ] SC	Placed:   [ ] Arterial Line		[ ] R	[ ] L	[ ] Fem	[ ] Rad	[ ] Ax	Placed:   [ ] PICC:					[ ] Mediport  [ ] Urinary Catheter, Date Placed:   [x] Necessity of urinary, arterial, and venous catheters discussed    OTHER MEDICATIONS:  artificial  tears Solution 1 Drop(s) Both EYES two times a day  chlorhexidine 0.12% Liquid 15 milliLiter(s) Oral Mucosa every 12 hours  chlorhexidine 4% Liquid 1 Application(s) Topical <User Schedule>      CODE STATUS:      IMAGING: SICU DAILY PROGRESS NOTE    71 y/o female s/p Whipple 2/2 pancreatic cancer recovering in SICU post operatively. Extubated afternoon 09/24, reintubated o/n 09/24 2/2 hypoxic respiratory failure.     24 HOUR EVENTS:  - IVF to 50cc/hr  - Precedex d/renaldo in favor of fent/cem  - POCUS- IVC 1.8 cm with minimal b lines  - CPAP 5/5/30%  - To start trickle TF's thru NG tube  - Restarted home Risperidone and Lopressor  - DC'd antibiotics    SUBJECTIVE/ROS:  [ ] A ten-point review of systems was otherwise negative except as noted.  [x] Due to altered mental status/intubation, subjective information were not able to be obtained from the patient. History was obtained, to the extent possible, from review of the chart and collateral sources of information.      NEURO  Exam: intubated, sedated  Meds: dexMEDEtomidine Infusion 1 MICROgram(s)/kG/Hr IV Continuous <Continuous>  fentaNYL   Infusion 0.5 MICROgram(s)/kG/Hr IV Continuous <Continuous>  HYDROmorphone  Injectable 0.5 milliGRAM(s) IV Push every 4 hours PRN Severe Pain (7 - 10)  HYDROmorphone  Injectable 0.25 milliGRAM(s) IV Push every 4 hours PRN Moderate Pain (4 - 6)    [x] Adequacy of sedation and pain control has been assessed and adjusted      RESPIRATORY  RR: 15 (09-26-22 @ 23:00) (15 - 27)  SpO2: 100% (09-26-22 @ 23:33) (98% - 100%)  Wt(kg): --  Exam: unlabored, intubated  Mechanical Ventilation: Mode: CPAP with PS, RR (patient): 22, FiO2: 40, PEEP: 5, PS: 5, MAP: 7, PIP: 13  ABG - ( 26 Sep 2022 15:25 )  pH: 7.41  /  pCO2: 33    /  pO2: 141   / HCO3: 21    / Base Excess: -2.9  /  SaO2: 99.4    Lactate: x        [N/A] Extubation Readiness Assessed  Meds: acetylcysteine 20%  Inhalation 4 milliLiter(s) Inhalation daily    CARDIOVASCULAR  HR: 90 (09-26-22 @ 23:33) (55 - 97)  BP: 132/51 (09-26-22 @ 18:00) (123/53 - 132/51)  BP(mean): 74 (09-26-22 @ 18:00) (73 - 74)  ABP: 112/45 (09-26-22 @ 23:00) (73/60 - 132/62)  ABP(mean): 65 (09-26-22 @ 23:00) (58 - 86)  Wt(kg): --  CVP(cm H2O): --      Exam: regular rate and rhythm  Cardiac Rhythm: sinus  Perfusion     [x]Adequate   [ ]Inadequate  Mentation   [x]Normal       [ ]Reduced  Extremities  [x]Warm         [ ]Cool  Volume Status [ ]Hypervolemic [x]Euvolemic [ ]Hypovolemic  Meds: phenylephrine    Infusion 0.5 MICROgram(s)/kG/Min IV Continuous <Continuous>        GI/NUTRITION  Exam: soft, nontender, nondistended, incision C/D/I  Diet:  Meds: pantoprazole  Injectable 40 milliGRAM(s) IV Push daily      GENITOURINARY  I&O's Detail    09-25 @ 07:01  -  09-26 @ 07:00  --------------------------------------------------------  IN:    Dexmedetomidine: 468.3 mL    dextrose 5% + sodium chloride 0.45% w/ Additives: 1400 mL    IV PiggyBack: 400 mL    Lactated Ringers: 1000 mL    Phenylephrine: 200.4 mL  Total IN: 3468.7 mL    OUT:    Drain (mL): 75 mL    Drain (mL): 280 mL    Indwelling Catheter - Urethral (mL): 1595 mL    Nasogastric/Oral tube (mL): 400 mL  Total OUT: 2350 mL    Total NET: 1118.7 mL      09-26 @ 07:01  -  09-27 @ 00:42  --------------------------------------------------------  IN:    Dexmedetomidine: 37.6 mL    dextrose 5% + sodium chloride 0.45% w/ Additives: 900 mL    FentaNYL: 41.1 mL    Phenylephrine: 208.1 mL  Total IN: 1186.8 mL    OUT:    Drain (mL): 15 mL    Drain (mL): 770 mL    Indwelling Catheter - Urethral (mL): 1000 mL    Nasogastric/Oral tube (mL): 200 mL  Total OUT: 1985 mL    Total NET: -798.2 mL          09-26    137  |  107  |  16  ----------------------------<  155<H>  3.8   |  21<L>  |  0.73    Ca    7.1<L>      26 Sep 2022 01:15  Phos  1.7     09-26  Mg     2.50     09-26    TPro  4.7<L>  /  Alb  2.0<L>  /  TBili  0.8  /  DBili  0.4<H>  /  AST  19  /  ALT  14  /  AlkPhos  54  09-26    [ ] Melgar catheter, indication: N/A  Meds: dextrose 5% + sodium chloride 0.45% with potassium chloride 20 mEq/L 1000 milliLiter(s) IV Continuous <Continuous>  sodium chloride 0.9% lock flush 10 milliLiter(s) IV Push every 1 hour PRN Pre/post blood products, medications, blood draw, and to maintain line patency        HEMATOLOGIC  Meds: heparin   Injectable 5000 Unit(s) SubCutaneous every 8 hours    [x] VTE Prophylaxis                        11.2   13.93 )-----------( 246      ( 26 Sep 2022 01:15 )             34.3     PT/INR - ( 25 Sep 2022 01:00 )   PT: 17.5 sec;   INR: 1.50 ratio         PTT - ( 25 Sep 2022 01:00 )  PTT:40.1 sec  Transfusion     [ ] PRBC   [ ] Platelets   [ ] FFP   [ ] Cryoprecipitate      INFECTIOUS DISEASES  WBC Count: 13.93 K/uL (09-26 @ 01:15)    RECENT CULTURES:    Meds: ciprofloxacin   IVPB 400 milliGRAM(s) IV Intermittent every 12 hours  metroNIDAZOLE  IVPB      metroNIDAZOLE  IVPB 500 milliGRAM(s) IV Intermittent every 8 hours        ENDOCRINE  CAPILLARY BLOOD GLUCOSE      POCT Blood Glucose.: 105 mg/dL (26 Sep 2022 23:37)  POCT Blood Glucose.: 116 mg/dL (26 Sep 2022 17:25)  POCT Blood Glucose.: 156 mg/dL (26 Sep 2022 11:26)  POCT Blood Glucose.: 152 mg/dL (26 Sep 2022 05:32)    Meds: insulin glargine Injectable (LANTUS) 8 Unit(s) SubCutaneous every morning  insulin lispro (ADMELOG) corrective regimen sliding scale   SubCutaneous every 6 hours        ACCESS DEVICES:  [ ] Peripheral IV  [ ] Central Venous Line	[ ] R	[ ] L	[ ] IJ	[ ] Fem	[ ] SC	Placed:   [ ] Arterial Line		[ ] R	[ ] L	[ ] Fem	[ ] Rad	[ ] Ax	Placed:   [ ] PICC:					[ ] Mediport  [ ] Urinary Catheter, Date Placed:   [x] Necessity of urinary, arterial, and venous catheters discussed    OTHER MEDICATIONS:  artificial  tears Solution 1 Drop(s) Both EYES two times a day  chlorhexidine 0.12% Liquid 15 milliLiter(s) Oral Mucosa every 12 hours  chlorhexidine 4% Liquid 1 Application(s) Topical <User Schedule>      CODE STATUS:      IMAGING:

## 2022-09-27 NOTE — PROGRESS NOTE ADULT - SUBJECTIVE AND OBJECTIVE BOX
POD # 5    Events of the past 24 hours were reviewed  Subjective: (By signs) I still have some incisional pain.  I have not passed any flatus yet.    Objective:   Vital Signs Last 24 Hrs  T(C): 37.5 (27 Sep 2022 12:00), Max: 37.5 (27 Sep 2022 12:00)  T(F): 99.5 (27 Sep 2022 12:00), Max: 99.5 (27 Sep 2022 12:00)  HR: 96 (27 Sep 2022 13:00) (59 - 117)  BP: 118/55 (27 Sep 2022 12:00) (91/50 - 159/56)  BP(mean): 73 (27 Sep 2022 12:00) (64 - 84)  RR: 18 (27 Sep 2022 13:00) (11 - 27)  SpO2: 100% (27 Sep 2022 13:00) (95% - 100%)    Parameters below as of 27 Sep 2022 12:00  Patient On (Oxygen Delivery Method): ventilator,CPAP5/5        Daily     Daily     Heent: N/C, AT PER no scleral icterus, No JVD  Pul: clear  Cor: RRR  Abdomen: soft, non distended.  Wound - clean  Extremities: diffuse edema, motor/sensory intact    I&O's Detail    26 Sep 2022 07:01  -  27 Sep 2022 07:00  --------------------------------------------------------  IN:    Dexmedetomidine: 37.6 mL    dextrose 5% + sodium chloride 0.45% w/ Additives: 1250 mL    FentaNYL: 41.1 mL    Phenylephrine: 309.5 mL  Total IN: 1638.2 mL    OUT:    Drain (mL): 1070 mL    Drain (mL): 55 mL    Indwelling Catheter - Urethral (mL): 1300 mL    Nasogastric/Oral tube (mL): 450 mL  Total OUT: 2875 mL    Total NET: -1236.8 mL      27 Sep 2022 07:01  -  27 Sep 2022 13:53  --------------------------------------------------------  IN:    Dexmedetomidine: 11.4 mL    dextrose 5% + sodium chloride 0.45% w/ Additives: 200 mL    Glucerna 1.5: 20 mL    IV PiggyBack: 100 mL  Total IN: 331.4 mL    OUT:    Drain (mL): 270 mL    Drain (mL): 30 mL    Indwelling Catheter - Urethral (mL): 1105 mL  Total OUT: 1405 mL    Total NET: -1073.6 mL          MEDICATIONS  (STANDING):  acetaminophen   IVPB .. 1000 milliGRAM(s) IV Intermittent every 6 hours  artificial  tears Solution 1 Drop(s) Both EYES two times a day  chlorhexidine 0.12% Liquid 15 milliLiter(s) Oral Mucosa every 12 hours  chlorhexidine 4% Liquid 1 Application(s) Topical <User Schedule>  heparin   Injectable 5000 Unit(s) SubCutaneous every 8 hours  insulin lispro (ADMELOG) corrective regimen sliding scale   SubCutaneous every 6 hours  metoprolol tartrate Injectable 5 milliGRAM(s) IV Push every 6 hours  pantoprazole  Injectable 40 milliGRAM(s) IV Push daily  risperiDONE   Tablet 2 milliGRAM(s) Oral at bedtime    MEDICATIONS  (PRN):  HYDROmorphone  Injectable 0.5 milliGRAM(s) IV Push every 4 hours PRN Severe Pain (7 - 10)  HYDROmorphone  Injectable 0.25 milliGRAM(s) IV Push every 4 hours PRN Moderate Pain (4 - 6)  sodium chloride 0.9% lock flush 10 milliLiter(s) IV Push every 1 hour PRN Pre/post blood products, medications, blood draw, and to maintain line patency                            9.9    16.16 )-----------( 280      ( 27 Sep 2022 01:10 )             31.1     09-27    140  |  112<H>  |  19  ----------------------------<  120<H>  4.1   |  18<L>  |  0.78    Ca    7.1<L>      27 Sep 2022 01:10  Phos  1.8     09-27  Mg     2.60     09-27    TPro  4.5<L>  /  Alb  1.9<L>  /  TBili  0.7  /  DBili  x   /  AST  14  /  ALT  10  /  AlkPhos  62  09-27    PT/INR - ( 27 Sep 2022 01:10 )   PT: 16.9 sec;   INR: 1.45 ratio         PTT - ( 27 Sep 2022 01:10 )  PTT:32.7 sec    Radiologic Studies:

## 2022-09-27 NOTE — PROGRESS NOTE ADULT - ASSESSMENT
PLAN  NEUROLOGIC   - Pain control: Tylenol  - Fent for sedation  - Dilaudid PRN  - Wean sedation as tolerated     RESPIRATORY   - Extubated 09/24 afternoon, reintubated 09/24 due to Hypoxic respiratory failure w CXR demonstrating L lung white out. S/p Bronchoscopy  - AC 18/400/8/50  - Patient CPAP'd 5/5/30%  - Mucomyst, Duoneb  - Plan for extubation in AM    CARDIOVASCULAR   - MAP> 65  - q1 Vitals   - Wean cem as tolerated     GASTROINTESTINAL   - Diet: NPO, NGT   - Monitor bowel function  - Monitor drain output  - Drain amylases  - NOT on Whipple pathway    /RENAL   - IV fluids: LR @ 50 cc/hr  - Strict I/Os  - Monitor BMP qd  - Maintain hernandez catheter, strict Is/Os  - Monitor electrolytes, replete PRN    HEMATOLOGIC  - Monitor H/H   - DVT ppx SQH    INFECTIOUS DISEASE  - Monitor fever / WBC  - C/W Cipro and Flagyl x7d per primary team    ENDOCRINE  - Monitor gluc    LINES  - RIJ Triple Lumen CVC   - A line L radial  - Hernandez  - PIV     DISPO: SICU           PLAN  NEUROLOGIC   - Pain control: Tylenol  - Fent for sedation. precedex held for hypotension  - Tylenol atc. Dilaudid PRN  - Wean sedation as tolerated   - Resume home risperidone    RESPIRATORY   - Extubated 09/24 afternoon, reintubated 09/24 due to Hypoxic respiratory failure w CXR demonstrating L lung white out. S/p Bronchoscopy  - CPAP 5/5/30%  - Mucomyst, Duoneb  - Wean to extubate    CARDIOVASCULAR   - MAP> 65  - q1 Vitals   - metop 5mg q6hr    GASTROINTESTINAL   - Diet: NGT  - Start trickle TF  - Monitor bowel function  - Monitor drain output  - Drain amylases  - NOT on Whipple pathway    /RENAL   - IV fluids:n d5 1/2NS +20kcl @ 50cc/hr  - Strict I/Os  - Monitor BMP qd  - Maintain hernandez catheter, strict Is/Os  - Monitor electrolytes, replete PRN    HEMATOLOGIC  - Monitor H/H   - DVT ppx SQH    INFECTIOUS DISEASE  - Monitor fever / WBC  - completed Cipro and Flagyl     ENDOCRINE  - Monitor gluc  - ISS    LINES  - RIJ Triple Lumen CVC   - A line L radial  - Hernandez  - PIV     DISPO: SICU           PLAN  NEUROLOGIC   - Pain control: Tylenol  - Fent for sedation. precedex held for hypotension  - Tylenol atc. Dilaudid PRN  - Wean sedation as tolerated   - Resume home risperidone    RESPIRATORY   - Extubated 09/24 afternoon, reintubated 09/24 due to Hypoxic respiratory failure w CXR demonstrating L lung white out. S/p Bronchoscopy  - CPAP 5/5/30%  - Mucomyst, Duoneb  - Wean to extubate    CARDIOVASCULAR   - MAP> 65  - q1 Vitals   - metoprolol 5mg q6hr    GASTROINTESTINAL   - Diet: NGT  - Start trickle TF  - Monitor bowel function  - Monitor drain output  - Drain amylases  - NOT on Whipple pathway    /RENAL   - IV fluids:n d5 1/2NS +20kcl @ 50cc/hr  - Strict I/Os  - Monitor BMP qd  - Maintain hernandez catheter, strict Is/Os  - Monitor electrolytes, replete PRN    HEMATOLOGIC  - Monitor H/H   - DVT ppx SQH    INFECTIOUS DISEASE  - Monitor fever / WBC  - completed Cipro and Flagyl     ENDOCRINE  - Monitor gluc  - ISS    LINES  - RIJ Triple Lumen CVC   - A line L radial  - Hernandez  - PIV     DISPO: SICU           PLAN  NEUROLOGIC   - Pain control: Tylenol  - Fent for sedation. precedex held for hypotension  - Tylenol atc. Dilaudid PRN  - Wean sedation as tolerated   - Resume home risperidone    RESPIRATORY   - Extubated 09/24 afternoon, reintubated 09/24 due to Hypoxic respiratory failure w CXR demonstrating L lung white out. S/p Bronchoscopy  - CPAP 5/5/30%  - Mucomyst, Duoneb  - Wean to extubate    CARDIOVASCULAR   - MAP> 65  - q1 Vitals   - metoprolol 5mg q6hr    GASTROINTESTINAL   - Diet: NGT  - Start trickle TF  - Monitor bowel function  - Monitor drain output  - Drain amylases  - NOT on Whipple pathway    /RENAL   - IV fluids: d5 1/2NS +20kcl @ 50cc/hr  - Strict I/Os  - Monitor BMP qd  - Maintain hernandez catheter, strict Is/Os  - Monitor electrolytes, replete PRN  - lasix 40mg iv x1    HEMATOLOGIC  - Monitor H/H   - DVT ppx SQH    INFECTIOUS DISEASE  - Monitor fever / WBC  - completed Cipro and Flagyl     ENDOCRINE  - Monitor gluc  - ISS    LINES  - RIJ Triple Lumen CVC   - d/c A line L radial  - Hernandez  - PIV     DISPO: SICU

## 2022-09-27 NOTE — PROGRESS NOTE ADULT - ASSESSMENT
72F w/ diagnosis of pancreatic cancer on endoscopy, s/p exploratory laparotomy with Whipple procedure, and placement of drains R posterior and L anterior to the anastomoses on 9/23. Drain amylase downtrending.    Plan:   - NPO/IVF hydration  - IV abx: Cipro/Flagyl  - PRN analgesia  - Wean sedation/vent per SICU  - Continue hernandez  - Monitor I&Os  - Monitor MARGRET 1 and 2 drains output  - Monitor bowel function  - f/u AM labs  - f/u drain amylase  - DVT ppx  - Appreciate excellent care per SICU      [facility]  P #[phone]

## 2022-09-27 NOTE — PROGRESS NOTE ADULT - SUBJECTIVE AND OBJECTIVE BOX
TEAM Surgery Progress Note  Patient is a 72y old  Female who presents with a chief complaint of S / P  WHIPPLE Procedure. (26 Sep 2022 17:38)      INTERVAL EVENTS: Patient is POD# ***No acute events overnight.  SUBJECTIVE: Patient seen and examined at bedside with surgical team, patient without complaints. Denies fever, chills, CP, SOB nausea, vomiting, abdominal pain.    REVIEW OF SYSTEMS:  Constitutional: No fevers or chills. No malaise or weakness.  EENT: No vision changes. No ear pain. No nasal congestion or rhinitis. No throat pain or dysphagia.  Respiratory: No cough, wheezing, or SOB. No hemoptysis.  Cardiovascular: No chest pain or palpitations.  Gastrointestinal: No abdominal pain. No nausea, vomiting, diarrhea or constipation. No hematochezia. No melena.  Genitourinary: No dysuria, hematuria, or frequency.  Neurologic: No numbness or tingling. No weakness.  Skin: No rashes or pruritus.     OBJECTIVE:    Vital Signs Last 24 Hrs  T(C): 37.2 (26 Sep 2022 20:00), Max: 37.8 (26 Sep 2022 08:00)  T(F): 98.9 (26 Sep 2022 20:00), Max: 100 (26 Sep 2022 08:00)  HR: 90 (26 Sep 2022 23:33) (55 - 97)  BP: 132/51 (26 Sep 2022 18:00) (123/53 - 132/51)  BP(mean): 74 (26 Sep 2022 18:00) (73 - 74)  RR: 15 (26 Sep 2022 23:00) (15 - 27)  SpO2: 100% (26 Sep 2022 23:33) (98% - 100%)    Parameters below as of 26 Sep 2022 23:00      O2 Concentration (%): 40I&O's Detail    25 Sep 2022 07:01  -  26 Sep 2022 07:00  --------------------------------------------------------  IN:    Dexmedetomidine: 468.3 mL    dextrose 5% + sodium chloride 0.45% w/ Additives: 1400 mL    IV PiggyBack: 400 mL    Lactated Ringers: 1000 mL    Phenylephrine: 200.4 mL  Total IN: 3468.7 mL    OUT:    Drain (mL): 75 mL    Drain (mL): 280 mL    Indwelling Catheter - Urethral (mL): 1595 mL    Nasogastric/Oral tube (mL): 400 mL  Total OUT: 2350 mL    Total NET: 1118.7 mL      26 Sep 2022 07:01  -  27 Sep 2022 00:35  --------------------------------------------------------  IN:    Dexmedetomidine: 37.6 mL    dextrose 5% + sodium chloride 0.45% w/ Additives: 900 mL    FentaNYL: 41.1 mL    Phenylephrine: 208.1 mL  Total IN: 1186.8 mL    OUT:    Drain (mL): 15 mL    Drain (mL): 770 mL    Indwelling Catheter - Urethral (mL): 1000 mL    Nasogastric/Oral tube (mL): 200 mL  Total OUT: 1985 mL    Total NET: -798.2 mL      MEDICATIONS  (STANDING):  acetylcysteine 20%  Inhalation 4 milliLiter(s) Inhalation daily  artificial  tears Solution 1 Drop(s) Both EYES two times a day  chlorhexidine 0.12% Liquid 15 milliLiter(s) Oral Mucosa every 12 hours  chlorhexidine 4% Liquid 1 Application(s) Topical <User Schedule>  ciprofloxacin   IVPB 400 milliGRAM(s) IV Intermittent every 12 hours  dexMEDEtomidine Infusion 1 MICROgram(s)/kG/Hr (18.8 mL/Hr) IV Continuous <Continuous>  dextrose 5% + sodium chloride 0.45% with potassium chloride 20 mEq/L 1000 milliLiter(s) (50 mL/Hr) IV Continuous <Continuous>  fentaNYL   Infusion 0.5 MICROgram(s)/kG/Hr (3.77 mL/Hr) IV Continuous <Continuous>  heparin   Injectable 5000 Unit(s) SubCutaneous every 8 hours  insulin glargine Injectable (LANTUS) 8 Unit(s) SubCutaneous every morning  insulin lispro (ADMELOG) corrective regimen sliding scale   SubCutaneous every 6 hours  metroNIDAZOLE  IVPB      metroNIDAZOLE  IVPB 500 milliGRAM(s) IV Intermittent every 8 hours  pantoprazole  Injectable 40 milliGRAM(s) IV Push daily  phenylephrine    Infusion 0.5 MICROgram(s)/kG/Min (14.1 mL/Hr) IV Continuous <Continuous>    MEDICATIONS  (PRN):  HYDROmorphone  Injectable 0.5 milliGRAM(s) IV Push every 4 hours PRN Severe Pain (7 - 10)  HYDROmorphone  Injectable 0.25 milliGRAM(s) IV Push every 4 hours PRN Moderate Pain (4 - 6)  sodium chloride 0.9% lock flush 10 milliLiter(s) IV Push every 1 hour PRN Pre/post blood products, medications, blood draw, and to maintain line patency      PHYSICAL EXAM:  Constitutional: A&Ox3, NAD  Respiratory: Unlabored breathing  Abdomen: Soft, nondistended, NTTP. No rebound or guarding.  Extremities: WWP, VENEGAS spontaneously    LABS:                        11.2   13.93 )-----------( 246      ( 26 Sep 2022 01:15 )             34.3     09-26    137  |  107  |  16  ----------------------------<  155<H>  3.8   |  21<L>  |  0.73    Ca    7.1<L>      26 Sep 2022 01:15  Phos  1.7     09-26  Mg     2.50     09-26    TPro  4.7<L>  /  Alb  2.0<L>  /  TBili  0.8  /  DBili  0.4<H>  /  AST  19  /  ALT  14  /  AlkPhos  54  09-26    PT/INR - ( 25 Sep 2022 01:00 )   PT: 17.5 sec;   INR: 1.50 ratio         PTT - ( 25 Sep 2022 01:00 )  PTT:40.1 sec  LIVER FUNCTIONS - ( 26 Sep 2022 01:15 )  Alb: 2.0 g/dL / Pro: 4.7 g/dL / ALK PHOS: 54 U/L / ALT: 14 U/L / AST: 19 U/L / GGT: x                 IMAGING:     Subjective:  No acute overnight events.   Patient seen and examined at bedside with surgical team during morning rounds.      PHYSICAL EXAM:  Constitutional: A&Ox3, NAD  Respiratory: Unlabored breathing  Abdomen: soft, non-distended. Wound - clean- with staples. MARGRET drains with serosanguinous output.  Extremities: WWP, VENEGAS spontaneously      T(C): 37.3 (09-27-22 @ 08:00), Max: 37.7 (09-26-22 @ 12:00)  HR: 88 (09-27-22 @ 09:00) (56 - 117)  BP: 112/49 (09-27-22 @ 09:00) (112/49 - 159/56)  RR: 14 (09-27-22 @ 09:00) (11 - 27)  SpO2: 100% (09-27-22 @ 09:00) (95% - 100%)      09-26-22 @ 07:01  -  09-27-22 @ 07:00  --------------------------------------------------------  IN: 1638.2 mL / OUT: 2875 mL / NET: -1236.8 mL    09-27-22 @ 07:01  -  09-27-22 @ 10:12  --------------------------------------------------------  IN: 107.6 mL / OUT: 385 mL / NET: -277.4 mL        LABS:  cret                        9.9    16.16 )-----------( 280      ( 27 Sep 2022 01:10 )             31.1     09-27    140  |  112<H>  |  19  ----------------------------<  120<H>  4.1   |  18<L>  |  0.78    Ca    7.1<L>      27 Sep 2022 01:10  Phos  1.8     09-27  Mg     2.60     09-27    TPro  4.5<L>  /  Alb  1.9<L>  /  TBili  0.7  /  DBili  x   /  AST  14  /  ALT  10  /  AlkPhos  62  09-27    PT/INR - ( 27 Sep 2022 01:10 )   PT: 16.9 sec;   INR: 1.45 ratio         PTT - ( 27 Sep 2022 01:10 )  PTT:32.7 sec      acetaminophen   IVPB .. 1000 milliGRAM(s) IV Intermittent every 6 hours  acetylcysteine 20%  Inhalation 4 milliLiter(s) Inhalation daily  artificial  tears Solution 1 Drop(s) Both EYES two times a day  chlorhexidine 0.12% Liquid 15 milliLiter(s) Oral Mucosa every 12 hours  chlorhexidine 4% Liquid 1 Application(s) Topical <User Schedule>  dexMEDEtomidine Infusion 1 MICROgram(s)/kG/Hr IV Continuous <Continuous>  dextrose 5% + sodium chloride 0.45% with potassium chloride 20 mEq/L 1000 milliLiter(s) IV Continuous <Continuous>  heparin   Injectable 5000 Unit(s) SubCutaneous every 8 hours  HYDROmorphone  Injectable 0.5 milliGRAM(s) IV Push every 4 hours PRN  HYDROmorphone  Injectable 0.25 milliGRAM(s) IV Push every 4 hours PRN  insulin lispro (ADMELOG) corrective regimen sliding scale   SubCutaneous every 6 hours  metoprolol tartrate Injectable 5 milliGRAM(s) IV Push every 6 hours  pantoprazole  Injectable 40 milliGRAM(s) IV Push daily  risperiDONE   Tablet 2 milliGRAM(s) Oral at bedtime  sodium chloride 0.9% lock flush 10 milliLiter(s) IV Push every 1 hour PRN      Imaging:

## 2022-09-28 LAB
ALBUMIN SERPL ELPH-MCNC: 2.1 G/DL — LOW (ref 3.3–5)
ALP SERPL-CCNC: 118 U/L — SIGNIFICANT CHANGE UP (ref 40–120)
ALT FLD-CCNC: 12 U/L — SIGNIFICANT CHANGE UP (ref 4–33)
AMYLASE FLD-CCNC: 64 U/L — SIGNIFICANT CHANGE UP
AMYLASE FLD-CCNC: >7500 U/L — SIGNIFICANT CHANGE UP
ANION GAP SERPL CALC-SCNC: 13 MMOL/L — SIGNIFICANT CHANGE UP (ref 7–14)
AST SERPL-CCNC: 12 U/L — SIGNIFICANT CHANGE UP (ref 4–32)
BILIRUB SERPL-MCNC: 0.7 MG/DL — SIGNIFICANT CHANGE UP (ref 0.2–1.2)
BUN SERPL-MCNC: 23 MG/DL — SIGNIFICANT CHANGE UP (ref 7–23)
CALCIUM SERPL-MCNC: 8.3 MG/DL — LOW (ref 8.4–10.5)
CHLORIDE SERPL-SCNC: 108 MMOL/L — HIGH (ref 98–107)
CO2 SERPL-SCNC: 22 MMOL/L — SIGNIFICANT CHANGE UP (ref 22–31)
CREAT SERPL-MCNC: 0.81 MG/DL — SIGNIFICANT CHANGE UP (ref 0.5–1.3)
EGFR: 77 ML/MIN/1.73M2 — SIGNIFICANT CHANGE UP
GLUCOSE BLDC GLUCOMTR-MCNC: 127 MG/DL — HIGH (ref 70–99)
GLUCOSE BLDC GLUCOMTR-MCNC: 157 MG/DL — HIGH (ref 70–99)
GLUCOSE BLDC GLUCOMTR-MCNC: 159 MG/DL — HIGH (ref 70–99)
GLUCOSE BLDC GLUCOMTR-MCNC: 213 MG/DL — HIGH (ref 70–99)
GLUCOSE SERPL-MCNC: 172 MG/DL — HIGH (ref 70–99)
HCT VFR BLD CALC: 30.8 % — LOW (ref 34.5–45)
HGB BLD-MCNC: 9.9 G/DL — LOW (ref 11.5–15.5)
MAGNESIUM SERPL-MCNC: 2.6 MG/DL — SIGNIFICANT CHANGE UP (ref 1.6–2.6)
MCHC RBC-ENTMCNC: 29.9 PG — SIGNIFICANT CHANGE UP (ref 27–34)
MCHC RBC-ENTMCNC: 32.1 GM/DL — SIGNIFICANT CHANGE UP (ref 32–36)
MCV RBC AUTO: 93.1 FL — SIGNIFICANT CHANGE UP (ref 80–100)
NRBC # BLD: 0 /100 WBCS — SIGNIFICANT CHANGE UP (ref 0–0)
NRBC # FLD: 0 K/UL — SIGNIFICANT CHANGE UP (ref 0–0)
PHOSPHATE SERPL-MCNC: 2.6 MG/DL — SIGNIFICANT CHANGE UP (ref 2.5–4.5)
PLATELET # BLD AUTO: 310 K/UL — SIGNIFICANT CHANGE UP (ref 150–400)
POTASSIUM SERPL-MCNC: 4.5 MMOL/L — SIGNIFICANT CHANGE UP (ref 3.5–5.3)
POTASSIUM SERPL-SCNC: 4.5 MMOL/L — SIGNIFICANT CHANGE UP (ref 3.5–5.3)
PROT SERPL-MCNC: 5 G/DL — LOW (ref 6–8.3)
RBC # BLD: 3.31 M/UL — LOW (ref 3.8–5.2)
RBC # FLD: 13.3 % — SIGNIFICANT CHANGE UP (ref 10.3–14.5)
SODIUM SERPL-SCNC: 143 MMOL/L — SIGNIFICANT CHANGE UP (ref 135–145)
WBC # BLD: 17.03 K/UL — HIGH (ref 3.8–10.5)
WBC # FLD AUTO: 17.03 K/UL — HIGH (ref 3.8–10.5)

## 2022-09-28 PROCEDURE — 71045 X-RAY EXAM CHEST 1 VIEW: CPT | Mod: 26,77

## 2022-09-28 PROCEDURE — 71045 X-RAY EXAM CHEST 1 VIEW: CPT | Mod: 26

## 2022-09-28 PROCEDURE — 93010 ELECTROCARDIOGRAM REPORT: CPT

## 2022-09-28 PROCEDURE — 99233 SBSQ HOSP IP/OBS HIGH 50: CPT

## 2022-09-28 PROCEDURE — 76604 US EXAM CHEST: CPT | Mod: 26

## 2022-09-28 RX ORDER — ACETAMINOPHEN 500 MG
1000 TABLET ORAL EVERY 6 HOURS
Refills: 0 | Status: DISCONTINUED | OUTPATIENT
Start: 2022-09-28 | End: 2022-10-02

## 2022-09-28 RX ORDER — FUROSEMIDE 40 MG
40 TABLET ORAL ONCE
Refills: 0 | Status: COMPLETED | OUTPATIENT
Start: 2022-09-28 | End: 2022-09-28

## 2022-09-28 RX ORDER — ENOXAPARIN SODIUM 100 MG/ML
40 INJECTION SUBCUTANEOUS EVERY 24 HOURS
Refills: 0 | Status: DISCONTINUED | OUTPATIENT
Start: 2022-09-28 | End: 2022-10-20

## 2022-09-28 RX ORDER — METOPROLOL TARTRATE 50 MG
50 TABLET ORAL DAILY
Refills: 0 | Status: DISCONTINUED | OUTPATIENT
Start: 2022-09-28 | End: 2022-10-20

## 2022-09-28 RX ORDER — OXYCODONE HYDROCHLORIDE 5 MG/1
5 TABLET ORAL EVERY 4 HOURS
Refills: 0 | Status: DISCONTINUED | OUTPATIENT
Start: 2022-09-28 | End: 2022-10-03

## 2022-09-28 RX ORDER — ALBUMIN HUMAN 25 %
250 VIAL (ML) INTRAVENOUS ONCE
Refills: 0 | Status: COMPLETED | OUTPATIENT
Start: 2022-09-28 | End: 2022-09-28

## 2022-09-28 RX ORDER — OXYCODONE HYDROCHLORIDE 5 MG/1
10 TABLET ORAL EVERY 4 HOURS
Refills: 0 | Status: DISCONTINUED | OUTPATIENT
Start: 2022-09-28 | End: 2022-10-05

## 2022-09-28 RX ORDER — INSULIN LISPRO 100/ML
VIAL (ML) SUBCUTANEOUS
Refills: 0 | Status: DISCONTINUED | OUTPATIENT
Start: 2022-09-28 | End: 2022-10-20

## 2022-09-28 RX ORDER — METOPROLOL TARTRATE 50 MG
5 TABLET ORAL ONCE
Refills: 0 | Status: COMPLETED | OUTPATIENT
Start: 2022-09-28 | End: 2022-09-28

## 2022-09-28 RX ORDER — SODIUM CHLORIDE 9 MG/ML
250 INJECTION, SOLUTION INTRAVENOUS ONCE
Refills: 0 | Status: COMPLETED | OUTPATIENT
Start: 2022-09-28 | End: 2022-09-28

## 2022-09-28 RX ADMIN — HYDROMORPHONE HYDROCHLORIDE 0.5 MILLIGRAM(S): 2 INJECTION INTRAMUSCULAR; INTRAVENOUS; SUBCUTANEOUS at 15:53

## 2022-09-28 RX ADMIN — ENOXAPARIN SODIUM 40 MILLIGRAM(S): 100 INJECTION SUBCUTANEOUS at 11:19

## 2022-09-28 RX ADMIN — Medication 5 MILLIGRAM(S): at 11:19

## 2022-09-28 RX ADMIN — Medication 2: at 06:48

## 2022-09-28 RX ADMIN — Medication 1000 MILLIGRAM(S): at 05:30

## 2022-09-28 RX ADMIN — OXYCODONE HYDROCHLORIDE 5 MILLIGRAM(S): 5 TABLET ORAL at 21:24

## 2022-09-28 RX ADMIN — PANTOPRAZOLE SODIUM 40 MILLIGRAM(S): 20 TABLET, DELAYED RELEASE ORAL at 11:19

## 2022-09-28 RX ADMIN — Medication 5 MILLIGRAM(S): at 00:18

## 2022-09-28 RX ADMIN — RISPERIDONE 2 MILLIGRAM(S): 4 TABLET ORAL at 21:23

## 2022-09-28 RX ADMIN — Medication 5 MILLIGRAM(S): at 08:45

## 2022-09-28 RX ADMIN — Medication 125 MILLILITER(S): at 03:01

## 2022-09-28 RX ADMIN — HYDROMORPHONE HYDROCHLORIDE 0.5 MILLIGRAM(S): 2 INJECTION INTRAMUSCULAR; INTRAVENOUS; SUBCUTANEOUS at 04:37

## 2022-09-28 RX ADMIN — HYDROMORPHONE HYDROCHLORIDE 0.5 MILLIGRAM(S): 2 INJECTION INTRAMUSCULAR; INTRAVENOUS; SUBCUTANEOUS at 00:46

## 2022-09-28 RX ADMIN — CHLORHEXIDINE GLUCONATE 1 APPLICATION(S): 213 SOLUTION TOPICAL at 05:14

## 2022-09-28 RX ADMIN — Medication 1000 MILLIGRAM(S): at 21:23

## 2022-09-28 RX ADMIN — Medication 1000 MILLIGRAM(S): at 22:00

## 2022-09-28 RX ADMIN — Medication 2: at 11:18

## 2022-09-28 RX ADMIN — Medication 5 MILLIGRAM(S): at 05:11

## 2022-09-28 RX ADMIN — HYDROMORPHONE HYDROCHLORIDE 0.5 MILLIGRAM(S): 2 INJECTION INTRAMUSCULAR; INTRAVENOUS; SUBCUTANEOUS at 14:57

## 2022-09-28 RX ADMIN — Medication 50 MILLIGRAM(S): at 16:07

## 2022-09-28 RX ADMIN — Medication 400 MILLIGRAM(S): at 00:20

## 2022-09-28 RX ADMIN — Medication 4: at 21:45

## 2022-09-28 RX ADMIN — SODIUM CHLORIDE 250 MILLILITER(S): 9 INJECTION, SOLUTION INTRAVENOUS at 04:31

## 2022-09-28 RX ADMIN — Medication 400 MILLIGRAM(S): at 05:11

## 2022-09-28 RX ADMIN — HYDROMORPHONE HYDROCHLORIDE 0.5 MILLIGRAM(S): 2 INJECTION INTRAMUSCULAR; INTRAVENOUS; SUBCUTANEOUS at 04:22

## 2022-09-28 RX ADMIN — HYDROMORPHONE HYDROCHLORIDE 0.5 MILLIGRAM(S): 2 INJECTION INTRAMUSCULAR; INTRAVENOUS; SUBCUTANEOUS at 09:55

## 2022-09-28 RX ADMIN — Medication 1 DROP(S): at 05:11

## 2022-09-28 RX ADMIN — Medication 40 MILLIGRAM(S): at 15:03

## 2022-09-28 RX ADMIN — OXYCODONE HYDROCHLORIDE 5 MILLIGRAM(S): 5 TABLET ORAL at 22:00

## 2022-09-28 RX ADMIN — HEPARIN SODIUM 5000 UNIT(S): 5000 INJECTION INTRAVENOUS; SUBCUTANEOUS at 05:12

## 2022-09-28 RX ADMIN — Medication 1 DROP(S): at 17:28

## 2022-09-28 RX ADMIN — HYDROMORPHONE HYDROCHLORIDE 0.5 MILLIGRAM(S): 2 INJECTION INTRAMUSCULAR; INTRAVENOUS; SUBCUTANEOUS at 00:21

## 2022-09-28 RX ADMIN — HYDROMORPHONE HYDROCHLORIDE 0.5 MILLIGRAM(S): 2 INJECTION INTRAMUSCULAR; INTRAVENOUS; SUBCUTANEOUS at 09:40

## 2022-09-28 RX ADMIN — Medication 1000 MILLIGRAM(S): at 00:35

## 2022-09-28 NOTE — PROGRESS NOTE ADULT - SUBJECTIVE AND OBJECTIVE BOX
SICU DAILY PROGRESS NOTE    24 HOUR EVENTS:  - Extubated @ 11:30, RSBI 38  - Lasix 40mg x2  - D/c antibiotics  - Restarted home risperidone and lopressor    SUBJECTIVE/ROS:  [ ] A ten-point review of systems was otherwise negative except as noted.  [x] Due to altered mental status/intubation, subjective information were not able to be obtained from the patient. History was obtained, to the extent possible, from review of the chart and collateral sources of information.      NEURO  Exam: awake, alert, oriented  Meds: acetaminophen   IVPB .. 1000 milliGRAM(s) IV Intermittent every 6 hours  HYDROmorphone  Injectable 0.5 milliGRAM(s) IV Push every 4 hours PRN Severe Pain (7 - 10)  HYDROmorphone  Injectable 0.25 milliGRAM(s) IV Push every 4 hours PRN Moderate Pain (4 - 6)  risperiDONE   Tablet 2 milliGRAM(s) Oral at bedtime    [] Adequacy of sedation and pain control has been assessed and adjusted      RESPIRATORY  RR: 16 (09-27-22 @ 23:00) (11 - 26)  SpO2: 100% (09-27-22 @ 23:00) (95% - 100%)  Wt(kg): --  Exam: unlabored on face tent, clear to auscultation bilaterally    ABG - ( 27 Sep 2022 01:10 )  pH: 7.40  /  pCO2: 33    /  pO2: 115   / HCO3: 20    / Base Excess: -3.8  /  SaO2: 99.2    Lactate: x        Meds:       CARDIOVASCULAR  HR: 89 (09-27-22 @ 23:00) (85 - 117)  BP: 139/50 (09-27-22 @ 23:00) (91/50 - 159/56)  BP(mean): 75 (09-27-22 @ 23:00) (64 - 85)  ABP: 114/87 (09-27-22 @ 10:00) (106/53 - 131/90)  ABP(mean): 98 (09-27-22 @ 10:00) (74 - 114)  Wt(kg): --  CVP(cm H2O): --      Exam: regular rate and rhythm  Cardiac Rhythm: sinus  Perfusion     [x]Adequate   [ ]Inadequate  Mentation   [x]Normal       [ ]Reduced  Extremities  [x]Warm         [ ]Cool  Volume Status [ ]Hypervolemic [x]Euvolemic [ ]Hypovolemic  Meds: metoprolol tartrate Injectable 5 milliGRAM(s) IV Push every 6 hours        GI/NUTRITION  Exam: soft, nontender, nondistended, incision C/D/I  Diet:  Meds: pantoprazole  Injectable 40 milliGRAM(s) IV Push daily      GENITOURINARY  I&O's Detail    09-26 @ 07:01  -  09-27 @ 07:00  --------------------------------------------------------  IN:    Dexmedetomidine: 37.6 mL    dextrose 5% + sodium chloride 0.45% w/ Additives: 1250 mL    FentaNYL: 41.1 mL    Phenylephrine: 309.5 mL  Total IN: 1638.2 mL    OUT:    Drain (mL): 1070 mL    Drain (mL): 55 mL    Indwelling Catheter - Urethral (mL): 1300 mL    Nasogastric/Oral tube (mL): 450 mL  Total OUT: 2875 mL    Total NET: -1236.8 mL      09-27 @ 07:01  -  09-28 @ 00:06  --------------------------------------------------------  IN:    Dexmedetomidine: 11.4 mL    dextrose 5% + sodium chloride 0.45% w/ Additives: 200 mL    Glucerna 1.5: 140 mL    IV PiggyBack: 100 mL  Total IN: 451.4 mL    OUT:    Drain (mL): 115 mL    Drain (mL): 590 mL    Indwelling Catheter - Urethral (mL): 2930 mL  Total OUT: 3635 mL    Total NET: -3183.6 mL          09-27    140  |  112<H>  |  19  ----------------------------<  120<H>  4.1   |  18<L>  |  0.78    Ca    7.1<L>      27 Sep 2022 01:10  Phos  1.8     09-27  Mg     2.60     09-27    TPro  4.5<L>  /  Alb  1.9<L>  /  TBili  0.7  /  DBili  x   /  AST  14  /  ALT  10  /  AlkPhos  62  09-27    [ ] Melgar catheter, indication: N/A  Meds: sodium chloride 0.9% lock flush 10 milliLiter(s) IV Push every 1 hour PRN Pre/post blood products, medications, blood draw, and to maintain line patency        HEMATOLOGIC  Meds: heparin   Injectable 5000 Unit(s) SubCutaneous every 8 hours    [x] VTE Prophylaxis                        9.9    16.16 )-----------( 280      ( 27 Sep 2022 01:10 )             31.1     PT/INR - ( 27 Sep 2022 01:10 )   PT: 16.9 sec;   INR: 1.45 ratio         PTT - ( 27 Sep 2022 01:10 )  PTT:32.7 sec  Transfusion     [ ] PRBC   [ ] Platelets   [ ] FFP   [ ] Cryoprecipitate      INFECTIOUS DISEASES  WBC Count: 16.16 K/uL (09-27 @ 01:10)    RECENT CULTURES:    Meds:       ENDOCRINE  CAPILLARY BLOOD GLUCOSE      POCT Blood Glucose.: 146 mg/dL (27 Sep 2022 21:43)  POCT Blood Glucose.: 140 mg/dL (27 Sep 2022 17:28)  POCT Blood Glucose.: 107 mg/dL (27 Sep 2022 11:39)  POCT Blood Glucose.: 94 mg/dL (27 Sep 2022 08:42)  POCT Blood Glucose.: 131 mg/dL (27 Sep 2022 05:32)    Meds: insulin lispro (ADMELOG) corrective regimen sliding scale   SubCutaneous every 6 hours        ACCESS DEVICES:  [ ] Peripheral IV  [ ] Central Venous Line	[ ] R	[ ] L	[ ] IJ	[ ] Fem	[ ] SC	Placed:   [ ] Arterial Line		[ ] R	[ ] L	[ ] Fem	[ ] Rad	[ ] Ax	Placed:   [ ] PICC:					[ ] Mediport  [ ] Urinary Catheter, Date Placed:   [x] Necessity of urinary, arterial, and venous catheters discussed    OTHER MEDICATIONS:  artificial  tears Solution 1 Drop(s) Both EYES two times a day  chlorhexidine 0.12% Liquid 15 milliLiter(s) Oral Mucosa every 12 hours  chlorhexidine 4% Liquid 1 Application(s) Topical <User Schedule>      CODE STATUS:      IMAGING: SICU DAILY PROGRESS NOTE    24 HOUR EVENTS:  - Extubated @ 11:30, RSBI 38  - S/p extra Lopressor 5mg IVP x1 this AM for persistent tachycardia  - NGT out   - DC hernandez  - To POCUS today: if B lines present, will give Lasix 40mg   - Central line and A line out  - Floors tomorrow       SUBJECTIVE/ROS:  [ ] A ten-point review of systems was otherwise negative except as noted.  [x] Due to altered mental status/intubation, subjective information were not able to be obtained from the patient. History was obtained, to the extent possible, from review of the chart and collateral sources of information.      NEURO  Exam: awake, alert, oriented  Meds: acetaminophen   IVPB .. 1000 milliGRAM(s) IV Intermittent every 6 hours  HYDROmorphone  Injectable 0.5 milliGRAM(s) IV Push every 4 hours PRN Severe Pain (7 - 10)  HYDROmorphone  Injectable 0.25 milliGRAM(s) IV Push every 4 hours PRN Moderate Pain (4 - 6)  risperiDONE   Tablet 2 milliGRAM(s) Oral at bedtime    [] Adequacy of sedation and pain control has been assessed and adjusted      RESPIRATORY  RR: 16 (09-27-22 @ 23:00) (11 - 26)  SpO2: 100% (09-27-22 @ 23:00) (95% - 100%)  Wt(kg): --  Exam: unlabored on face tent, clear to auscultation bilaterally    ABG - ( 27 Sep 2022 01:10 )  pH: 7.40  /  pCO2: 33    /  pO2: 115   / HCO3: 20    / Base Excess: -3.8  /  SaO2: 99.2    Lactate: x        Meds:       CARDIOVASCULAR  HR: 89 (09-27-22 @ 23:00) (85 - 117)  BP: 139/50 (09-27-22 @ 23:00) (91/50 - 159/56)  BP(mean): 75 (09-27-22 @ 23:00) (64 - 85)  ABP: 114/87 (09-27-22 @ 10:00) (106/53 - 131/90)  ABP(mean): 98 (09-27-22 @ 10:00) (74 - 114)  Wt(kg): --  CVP(cm H2O): --      Exam: regular rate and rhythm  Cardiac Rhythm: sinus  Perfusion     [x]Adequate   [ ]Inadequate  Mentation   [x]Normal       [ ]Reduced  Extremities  [x]Warm         [ ]Cool  Volume Status [ ]Hypervolemic [x]Euvolemic [ ]Hypovolemic  Meds: metoprolol tartrate Injectable 5 milliGRAM(s) IV Push every 6 hours        GI/NUTRITION  Exam: soft, nontender, nondistended, incision C/D/I  Diet:  Meds: pantoprazole  Injectable 40 milliGRAM(s) IV Push daily      GENITOURINARY  I&O's Detail    09-26 @ 07:01  -  09-27 @ 07:00  --------------------------------------------------------  IN:    Dexmedetomidine: 37.6 mL    dextrose 5% + sodium chloride 0.45% w/ Additives: 1250 mL    FentaNYL: 41.1 mL    Phenylephrine: 309.5 mL  Total IN: 1638.2 mL    OUT:    Drain (mL): 1070 mL    Drain (mL): 55 mL    Indwelling Catheter - Urethral (mL): 1300 mL    Nasogastric/Oral tube (mL): 450 mL  Total OUT: 2875 mL    Total NET: -1236.8 mL      09-27 @ 07:01  -  09-28 @ 00:06  --------------------------------------------------------  IN:    Dexmedetomidine: 11.4 mL    dextrose 5% + sodium chloride 0.45% w/ Additives: 200 mL    Glucerna 1.5: 140 mL    IV PiggyBack: 100 mL  Total IN: 451.4 mL    OUT:    Drain (mL): 115 mL    Drain (mL): 590 mL    Indwelling Catheter - Urethral (mL): 2930 mL  Total OUT: 3635 mL    Total NET: -3183.6 mL          09-27    140  |  112<H>  |  19  ----------------------------<  120<H>  4.1   |  18<L>  |  0.78    Ca    7.1<L>      27 Sep 2022 01:10  Phos  1.8     09-27  Mg     2.60     09-27    TPro  4.5<L>  /  Alb  1.9<L>  /  TBili  0.7  /  DBili  x   /  AST  14  /  ALT  10  /  AlkPhos  62  09-27    [ ] Hernandez catheter, indication: N/A  Meds: sodium chloride 0.9% lock flush 10 milliLiter(s) IV Push every 1 hour PRN Pre/post blood products, medications, blood draw, and to maintain line patency        HEMATOLOGIC  Meds: heparin   Injectable 5000 Unit(s) SubCutaneous every 8 hours    [x] VTE Prophylaxis                        9.9    16.16 )-----------( 280      ( 27 Sep 2022 01:10 )             31.1     PT/INR - ( 27 Sep 2022 01:10 )   PT: 16.9 sec;   INR: 1.45 ratio         PTT - ( 27 Sep 2022 01:10 )  PTT:32.7 sec  Transfusion     [ ] PRBC   [ ] Platelets   [ ] FFP   [ ] Cryoprecipitate      INFECTIOUS DISEASES  WBC Count: 16.16 K/uL (09-27 @ 01:10)    RECENT CULTURES:    Meds:       ENDOCRINE  CAPILLARY BLOOD GLUCOSE      POCT Blood Glucose.: 146 mg/dL (27 Sep 2022 21:43)  POCT Blood Glucose.: 140 mg/dL (27 Sep 2022 17:28)  POCT Blood Glucose.: 107 mg/dL (27 Sep 2022 11:39)  POCT Blood Glucose.: 94 mg/dL (27 Sep 2022 08:42)  POCT Blood Glucose.: 131 mg/dL (27 Sep 2022 05:32)    Meds: insulin lispro (ADMELOG) corrective regimen sliding scale   SubCutaneous every 6 hours        ACCESS DEVICES:  [ ] Peripheral IV  [ ] Central Venous Line	[ ] R	[ ] L	[ ] IJ	[ ] Fem	[ ] SC	Placed:   [ ] Arterial Line		[ ] R	[ ] L	[ ] Fem	[ ] Rad	[ ] Ax	Placed:   [ ] PICC:					[ ] Mediport  [ ] Urinary Catheter, Date Placed:   [x] Necessity of urinary, arterial, and venous catheters discussed    OTHER MEDICATIONS:  artificial  tears Solution 1 Drop(s) Both EYES two times a day  chlorhexidine 0.12% Liquid 15 milliLiter(s) Oral Mucosa every 12 hours  chlorhexidine 4% Liquid 1 Application(s) Topical <User Schedule>      CODE STATUS:      IMAGING: SICU DAILY PROGRESS NOTE  71 y/o female s/p Whipple 2/2 pancreatic cancer recovering in SICU post operatively. Extubated afternoon 09/24, reintubated o/n 09/24 2/2 hypoxic respiratory failure.     24 HOUR EVENTS:  - Extubated @ 11:30, RSBI 38  - S/p extra Lopressor 5mg IVP x1 this AM for persistent tachycardia  - NGT out   - DC hernandez  - To POCUS today: if B lines present, will give Lasix 40mg   - Central line and A line out  - Floors tomorrow       SUBJECTIVE/ROS:  [ ] A ten-point review of systems was otherwise negative except as noted.  [x] Due to altered mental status/intubation, subjective information were not able to be obtained from the patient. History was obtained, to the extent possible, from review of the chart and collateral sources of information.      NEURO  Exam: awake, alert, oriented  Meds: acetaminophen   IVPB .. 1000 milliGRAM(s) IV Intermittent every 6 hours  HYDROmorphone  Injectable 0.5 milliGRAM(s) IV Push every 4 hours PRN Severe Pain (7 - 10)  HYDROmorphone  Injectable 0.25 milliGRAM(s) IV Push every 4 hours PRN Moderate Pain (4 - 6)  risperiDONE   Tablet 2 milliGRAM(s) Oral at bedtime    [] Adequacy of sedation and pain control has been assessed and adjusted      RESPIRATORY  RR: 16 (09-27-22 @ 23:00) (11 - 26)  SpO2: 100% (09-27-22 @ 23:00) (95% - 100%)  Wt(kg): --  Exam: unlabored on face tent, clear to auscultation bilaterally    ABG - ( 27 Sep 2022 01:10 )  pH: 7.40  /  pCO2: 33    /  pO2: 115   / HCO3: 20    / Base Excess: -3.8  /  SaO2: 99.2    Lactate: x        Meds:       CARDIOVASCULAR  HR: 89 (09-27-22 @ 23:00) (85 - 117)  BP: 139/50 (09-27-22 @ 23:00) (91/50 - 159/56)  BP(mean): 75 (09-27-22 @ 23:00) (64 - 85)  ABP: 114/87 (09-27-22 @ 10:00) (106/53 - 131/90)  ABP(mean): 98 (09-27-22 @ 10:00) (74 - 114)  Wt(kg): --  CVP(cm H2O): --      Exam: regular rate and rhythm  Cardiac Rhythm: sinus  Perfusion     [x]Adequate   [ ]Inadequate  Mentation   [x]Normal       [ ]Reduced  Extremities  [x]Warm         [ ]Cool  Volume Status [ ]Hypervolemic [x]Euvolemic [ ]Hypovolemic  Meds: metoprolol tartrate Injectable 5 milliGRAM(s) IV Push every 6 hours        GI/NUTRITION  Exam: soft, nontender, nondistended, incision C/D/I  Diet:  Meds: pantoprazole  Injectable 40 milliGRAM(s) IV Push daily      GENITOURINARY  I&O's Detail    09-26 @ 07:01  -  09-27 @ 07:00  --------------------------------------------------------  IN:    Dexmedetomidine: 37.6 mL    dextrose 5% + sodium chloride 0.45% w/ Additives: 1250 mL    FentaNYL: 41.1 mL    Phenylephrine: 309.5 mL  Total IN: 1638.2 mL    OUT:    Drain (mL): 1070 mL    Drain (mL): 55 mL    Indwelling Catheter - Urethral (mL): 1300 mL    Nasogastric/Oral tube (mL): 450 mL  Total OUT: 2875 mL    Total NET: -1236.8 mL      09-27 @ 07:01  -  09-28 @ 00:06  --------------------------------------------------------  IN:    Dexmedetomidine: 11.4 mL    dextrose 5% + sodium chloride 0.45% w/ Additives: 200 mL    Glucerna 1.5: 140 mL    IV PiggyBack: 100 mL  Total IN: 451.4 mL    OUT:    Drain (mL): 115 mL    Drain (mL): 590 mL    Indwelling Catheter - Urethral (mL): 2930 mL  Total OUT: 3635 mL    Total NET: -3183.6 mL          09-27    140  |  112<H>  |  19  ----------------------------<  120<H>  4.1   |  18<L>  |  0.78    Ca    7.1<L>      27 Sep 2022 01:10  Phos  1.8     09-27  Mg     2.60     09-27    TPro  4.5<L>  /  Alb  1.9<L>  /  TBili  0.7  /  DBili  x   /  AST  14  /  ALT  10  /  AlkPhos  62  09-27    [ ] Hernandez catheter, indication: N/A  Meds: sodium chloride 0.9% lock flush 10 milliLiter(s) IV Push every 1 hour PRN Pre/post blood products, medications, blood draw, and to maintain line patency        HEMATOLOGIC  Meds: heparin   Injectable 5000 Unit(s) SubCutaneous every 8 hours    [x] VTE Prophylaxis                        9.9    16.16 )-----------( 280      ( 27 Sep 2022 01:10 )             31.1     PT/INR - ( 27 Sep 2022 01:10 )   PT: 16.9 sec;   INR: 1.45 ratio         PTT - ( 27 Sep 2022 01:10 )  PTT:32.7 sec  Transfusion     [ ] PRBC   [ ] Platelets   [ ] FFP   [ ] Cryoprecipitate      INFECTIOUS DISEASES  WBC Count: 16.16 K/uL (09-27 @ 01:10)    RECENT CULTURES:    Meds:       ENDOCRINE  CAPILLARY BLOOD GLUCOSE      POCT Blood Glucose.: 146 mg/dL (27 Sep 2022 21:43)  POCT Blood Glucose.: 140 mg/dL (27 Sep 2022 17:28)  POCT Blood Glucose.: 107 mg/dL (27 Sep 2022 11:39)  POCT Blood Glucose.: 94 mg/dL (27 Sep 2022 08:42)  POCT Blood Glucose.: 131 mg/dL (27 Sep 2022 05:32)    Meds: insulin lispro (ADMELOG) corrective regimen sliding scale   SubCutaneous every 6 hours        ACCESS DEVICES:  [ ] Peripheral IV  [ ] Central Venous Line	[ ] R	[ ] L	[ ] IJ	[ ] Fem	[ ] SC	Placed:   [ ] Arterial Line		[ ] R	[ ] L	[ ] Fem	[ ] Rad	[ ] Ax	Placed:   [ ] PICC:					[ ] Mediport  [ ] Urinary Catheter, Date Placed:   [x] Necessity of urinary, arterial, and venous catheters discussed    OTHER MEDICATIONS:  artificial  tears Solution 1 Drop(s) Both EYES two times a day  chlorhexidine 0.12% Liquid 15 milliLiter(s) Oral Mucosa every 12 hours  chlorhexidine 4% Liquid 1 Application(s) Topical <User Schedule>      CODE STATUS:      IMAGING:

## 2022-09-28 NOTE — PROGRESS NOTE ADULT - SUBJECTIVE AND OBJECTIVE BOX
Surgery Progress Note     Patient is a 72y old  Female who presents with a chief complaint of Pancreatic carcinoma (27 Sep 2022 13:45)      HPI:  This is a 73 y/o female whose native language is Maldivian Creole; comprehends and verbalizes minimal English. Refused hospital ; requested sister Mariaa function as . Patient   presents with diagnosis of pancreatic cancer on endoscopy biopsy. Subsequent CT scan and PET scan confirm pathology. Scheduled for whipple procedure pancreatic cancer (20 Sep 2022 07:10)      PAST MEDICAL & SURGICAL HISTORY:  Language barrier  native language [place]ian Creole; comprehends and verbzlizes minimal English      Type 2 diabetes mellitus      Anxiety and depression      Hypertension      Pancreatic cancer  September 2022      Uterine fibroid  hysterectomyu 1988      H/O jaundice  biliary stent in place          Physical Exam:    Vital Signs Last 24 Hrs  T(C): 36.8 (28 Sep 2022 08:00), Max: 37.8 (27 Sep 2022 16:00)  T(F): 98.3 (28 Sep 2022 08:00), Max: 100 (27 Sep 2022 16:00)  HR: 103 (28 Sep 2022 11:00) (82 - 117)  BP: 159/66 (28 Sep 2022 11:00) (118/55 - 176/57)  BP(mean): 93 (28 Sep 2022 11:00) (68 - 93)  RR: 18 (28 Sep 2022 11:00) (13 - 32)  SpO2: 100% (28 Sep 2022 11:00) (97% - 100%)    Parameters below as of 28 Sep 2022 10:00  Patient On (Oxygen Delivery Method): nasal cannula  O2 Flow (L/min): 2      General appearance:    Much better, Edema less. Extubated , N G tube out. To start on clears.        Drainage  as expected. to check amylase level.    Labs:                          9.9    17.03 )-----------( 310      ( 28 Sep 2022 01:30 )             30.8     09-28    143  |  108<H>  |  23  ----------------------------<  172<H>  4.5   |  22  |  0.81    Ca    8.3<L>      28 Sep 2022 01:30  Phos  2.6     09-28  Mg     2.60     09-28    TPro  5.0<L>  /  Alb  2.1<L>  /  TBili  0.7  /  DBili  x   /  AST  12  /  ALT  12  /  AlkPhos  118  09-28          Assessment and Plan:

## 2022-09-28 NOTE — PROGRESS NOTE ADULT - SUBJECTIVE AND OBJECTIVE BOX
A Team (General Surgery) Daily Progress Note    SUBJECTIVE:  Pt seen and examined, and is resting comfortably in bed. No acute events overnight. Pain is adequately controlled on current regimen. Pt has no complaints at this time.    24 HOUR EVENTS:  - Extubated @ 11:30, RSBI 38  - Lasix 40mg x2  - D/c antibiotics  - Restarted home risperidone and lopressor    OBJECTIVE:  Vital Signs Last 24 Hrs  T(C): 36.8 (28 Sep 2022 08:00), Max: 37.8 (27 Sep 2022 16:00)  T(F): 98.3 (28 Sep 2022 08:00), Max: 100 (27 Sep 2022 16:00)  HR: 82 (28 Sep 2022 09:00) (82 - 117)  BP: 129/55 (28 Sep 2022 09:00) (91/50 - 176/57)  BP(mean): 74 (28 Sep 2022 09:00) (64 - 93)  RR: 17 (28 Sep 2022 09:00) (12 - 32)  SpO2: 100% (28 Sep 2022 09:00) (97% - 100%)    Parameters below as of 28 Sep 2022 08:00  Patient On (Oxygen Delivery Method): nasal cannula  O2 Flow (L/min): 2      I&O's Detail    27 Sep 2022 07:01  -  28 Sep 2022 07:00  --------------------------------------------------------  IN:    Albumin 5%  - 250 mL: 250 mL    Dexmedetomidine: 11.4 mL    dextrose 5% + sodium chloride 0.45% w/ Additives: 200 mL    Glucerna 1.5: 210 mL    IV PiggyBack: 300 mL    Lactated Ringers Bolus: 250 mL  Total IN: 1221.4 mL    OUT:    Drain (mL): 135 mL    Drain (mL): 960 mL    Indwelling Catheter - Urethral (mL): 3530 mL  Total OUT: 4625 mL    Total NET: -3403.6 mL      28 Sep 2022 07:01  -  28 Sep 2022 09:52  --------------------------------------------------------  IN:    Glucerna 1.5: 20 mL  Total IN: 20 mL    OUT:    Drain (mL): 5 mL    Drain (mL): 50 mL    Indwelling Catheter - Urethral (mL): 160 mL  Total OUT: 215 mL    Total NET: -195 mL        Exam:  Constitutional: A&Ox3, NAD  Respiratory: Unlabored breathing  Abdomen: soft, non-distended. Wound - clean- with staples. MARGRET drains with serosanguinous output.  Extremities: WWP, VENEGAS spontaneously                        9.9    17.03 )-----------( 310      ( 28 Sep 2022 01:30 )             30.8       09-28    143  |  108<H>  |  23  ----------------------------<  172<H>  4.5   |  22  |  0.81    Ca    8.3<L>      28 Sep 2022 01:30  Phos  2.6     09-28  Mg     2.60     09-28    TPro  5.0<L>  /  Alb  2.1<L>  /  TBili  0.7  /  DBili  x   /  AST  12  /  ALT  12  /  AlkPhos  118  09-28      PT/INR - ( 27 Sep 2022 01:10 )   PT: 16.9 sec;   INR: 1.45 ratio         PTT - ( 27 Sep 2022 01:10 )  PTT:32.7 sec    ASSESSMENT:  72F w/ diagnosis of pancreatic cancer on endoscopy, s/p exploratory laparotomy with Whipple procedure, and placement of drains R posterior and L anterior to the anastomoses on 9/23. Drain amylase downtrending.    Plan:   - NPO/IVF hydration  - IV abx: Cipro/Flagyl  - IVF @30cc/hr  - PRN analgesia  - Wean sedation/vent per SICU  - Continue hernandez  - Monitor I&Os  - Monitor MARGRET 1 and 2 drains output  - Monitor bowel function  - f/u AM labs  - f/u drain amylase  - DVT ppx  - Appreciate excellent care per SICU    A Team Surgery  pager: [phone]

## 2022-09-28 NOTE — CHART NOTE - NSCHARTNOTEFT_GEN_A_CORE
Bedside point of care ultrasound performed. Appropriate lung sliding appreciated b/l. No evidence of pneumothorax or pleural effusion. A lines appreciated b/l w/o evidence of B lines. IVC measured at 1.3cm. Bedside point of care ultrasound performed. Appropriate lung sliding appreciated b/l. No evidence of pneumothorax or pleural effusion. A lines appreciated b/l w/o evidence of B lines. IVC measured at 1.3cm.  Agree/RB

## 2022-09-28 NOTE — PHYSICAL THERAPY INITIAL EVALUATION ADULT - ADDITIONAL COMMENTS
Pt lives in a private house with her sister, +3 steps to enter, no steps inside to negotiate. Pt was independent with all afunctional mobility using rolling walker and ADLs prior to admission.       Pt left seated in recliner at bedside, all lines intact, call bell in reach, in NAD. TATO Green present throughout.

## 2022-09-28 NOTE — PHYSICAL THERAPY INITIAL EVALUATION ADULT - GENERAL OBSERVATIONS, REHAB EVAL
Pt received semi-supine in bed, +hernandez, +telemetry, comfortable and in NAD. Pt agreeable to participate in PT evaluation.

## 2022-09-28 NOTE — PHYSICAL THERAPY INITIAL EVALUATION ADULT - PERTINENT HX OF CURRENT PROBLEM, REHAB EVAL
72  year old female s/p Whipple secondary to pancreatic cancer recovering in SICU post operatively. Extubated afternoon 09/24, reintubated o/n 09/24 secondary to hypoxic respiratory failure.

## 2022-09-28 NOTE — PROGRESS NOTE ADULT - ASSESSMENT
Plan  NEUROLOGIC   - Pain control: Tylenol atc. Dilaudid PRN  - Resume home risperidone    RESPIRATORY   - Extubated 09/24 afternoon, reintubated 09/24 due to Hypoxic respiratory failure w CXR demonstrating L lung white out. S/p Bronchoscopy  - Extubated 09/27 evening  - Face tent    CARDIOVASCULAR   - MAP> 65  - q1 Vitals   - metoprolol 5mg q6hr    GASTROINTESTINAL   - Diet: NGT  - Start trickle TF  - Monitor bowel function  - Monitor drain output  - Drain amylases  - NOT on Whipple pathway    /RENAL   - IV fluids:n d5 1/2NS +20kcl @ 50cc/hr  - Strict I/Os  - Monitor BMP qd  - Maintain hernandez catheter, strict Is/Os  - Monitor electrolytes, replete PRN  - Lasix 40mg x2 9/27    HEMATOLOGIC  - Monitor H/H   - DVT ppx SQH    INFECTIOUS DISEASE  - Monitor fever / WBC  - completed Cipro and Flagyl     ENDOCRINE  - Monitor gluc  - ISS    LINES  - RIJ Triple Lumen CVC   - A line L radial  - Hernandez  - PIV     DISPO: SICU   Plan  NEUROLOGIC   - Pain control: Tylenol atc. Dilaudid PRN  - Resume home risperidone    RESPIRATORY   - Extubated 09/24 afternoon, reintubated 09/24 due to Hypoxic respiratory failure w CXR demonstrating L lung white out. S/p Bronchoscopy  - Extubated 09/28, RSBI 38  - Face tent, wean as tolerated    CARDIOVASCULAR   - MAP> 65  - q1 Vitals   - metoprolol 5mg q6hr    GASTROINTESTINAL   - Diet: CLD, ensure cans BID  - Monitor bowel function  - Monitor drain output  - Drain amylases  - NOT on Whipple pathway    /RENAL   - IV fluids:n d5 1/2NS +20kcl @ 50cc/hr  - Strict I/Os  - Monitor BMP qd  - DC'd hernandez 09/28  - Monitor electrolytes, replete PRN  - Lasix 40mg x2 9/27    HEMATOLOGIC  - Monitor H/H   - DVT ppx SQH    INFECTIOUS DISEASE  - Monitor fever / WBC  - completed Cipro and Flagyl     ENDOCRINE  - Monitor gluc  - ISS    LINES  - PIV     DISPO: SICU

## 2022-09-28 NOTE — PHYSICAL THERAPY INITIAL EVALUATION ADULT - REFERRAL TO ANOTHER SERVICE NEEDED, PT EVAL
occupational therapy [facility] Counseling:  I discussed with the patient the risks of golimumab including but not limited to myelosuppression, immunosuppression, autoimmune hepatitis, demyelinating diseases, lymphoma, and serious infections.  The patient understands that monitoring is required including a PPD at baseline and must alert us or the primary physician if symptoms of infection or other concerning signs are noted.

## 2022-09-29 LAB
AMYLASE FLD-CCNC: 138 U/L — SIGNIFICANT CHANGE UP
AMYLASE FLD-CCNC: 181 U/L — SIGNIFICANT CHANGE UP
GLUCOSE BLDC GLUCOMTR-MCNC: 147 MG/DL — HIGH (ref 70–99)
GLUCOSE BLDC GLUCOMTR-MCNC: 149 MG/DL — HIGH (ref 70–99)
GLUCOSE BLDC GLUCOMTR-MCNC: 290 MG/DL — HIGH (ref 70–99)
GLUCOSE BLDC GLUCOMTR-MCNC: 343 MG/DL — HIGH (ref 70–99)

## 2022-09-29 PROCEDURE — 99233 SBSQ HOSP IP/OBS HIGH 50: CPT

## 2022-09-29 RX ORDER — INSULIN GLARGINE 100 [IU]/ML
9 INJECTION, SOLUTION SUBCUTANEOUS AT BEDTIME
Refills: 0 | Status: DISCONTINUED | OUTPATIENT
Start: 2022-09-29 | End: 2022-10-20

## 2022-09-29 RX ADMIN — Medication 1000 MILLIGRAM(S): at 04:20

## 2022-09-29 RX ADMIN — OXYCODONE HYDROCHLORIDE 10 MILLIGRAM(S): 5 TABLET ORAL at 18:38

## 2022-09-29 RX ADMIN — OXYCODONE HYDROCHLORIDE 10 MILLIGRAM(S): 5 TABLET ORAL at 22:26

## 2022-09-29 RX ADMIN — Medication 1 DROP(S): at 04:20

## 2022-09-29 RX ADMIN — Medication 1000 MILLIGRAM(S): at 05:00

## 2022-09-29 RX ADMIN — OXYCODONE HYDROCHLORIDE 10 MILLIGRAM(S): 5 TABLET ORAL at 08:39

## 2022-09-29 RX ADMIN — OXYCODONE HYDROCHLORIDE 10 MILLIGRAM(S): 5 TABLET ORAL at 18:02

## 2022-09-29 RX ADMIN — OXYCODONE HYDROCHLORIDE 10 MILLIGRAM(S): 5 TABLET ORAL at 01:43

## 2022-09-29 RX ADMIN — Medication 1000 MILLIGRAM(S): at 18:02

## 2022-09-29 RX ADMIN — Medication 1000 MILLIGRAM(S): at 12:26

## 2022-09-29 RX ADMIN — Medication 6: at 18:02

## 2022-09-29 RX ADMIN — OXYCODONE HYDROCHLORIDE 10 MILLIGRAM(S): 5 TABLET ORAL at 02:30

## 2022-09-29 RX ADMIN — Medication 50 MILLIGRAM(S): at 05:18

## 2022-09-29 RX ADMIN — OXYCODONE HYDROCHLORIDE 10 MILLIGRAM(S): 5 TABLET ORAL at 23:00

## 2022-09-29 RX ADMIN — Medication 1000 MILLIGRAM(S): at 23:00

## 2022-09-29 RX ADMIN — Medication 8: at 22:43

## 2022-09-29 RX ADMIN — RISPERIDONE 2 MILLIGRAM(S): 4 TABLET ORAL at 22:42

## 2022-09-29 RX ADMIN — Medication 1000 MILLIGRAM(S): at 18:38

## 2022-09-29 RX ADMIN — Medication 1000 MILLIGRAM(S): at 11:48

## 2022-09-29 RX ADMIN — OXYCODONE HYDROCHLORIDE 10 MILLIGRAM(S): 5 TABLET ORAL at 07:43

## 2022-09-29 RX ADMIN — OXYCODONE HYDROCHLORIDE 5 MILLIGRAM(S): 5 TABLET ORAL at 10:46

## 2022-09-29 RX ADMIN — CHLORHEXIDINE GLUCONATE 1 APPLICATION(S): 213 SOLUTION TOPICAL at 05:19

## 2022-09-29 RX ADMIN — OXYCODONE HYDROCHLORIDE 5 MILLIGRAM(S): 5 TABLET ORAL at 11:14

## 2022-09-29 RX ADMIN — ENOXAPARIN SODIUM 40 MILLIGRAM(S): 100 INJECTION SUBCUTANEOUS at 10:45

## 2022-09-29 NOTE — PROGRESS NOTE ADULT - ASSESSMENT
Plan  NEUROLOGIC   - Pain control: Tylenol atc. Dilaudid PRN  - Resume home risperidone    RESPIRATORY   - Extubated 09/24 afternoon, reintubated 09/24 due to Hypoxic respiratory failure w CXR demonstrating L lung white out. S/p Bronchoscopy  - Extubated 09/28, RSBI 38  - now on room air    CARDIOVASCULAR   - MAP> 65  - q1 Vitals   - metoprolol 5mg q6hr    GASTROINTESTINAL   - Diet: CLD, ensure cans BID  - Monitor bowel function  - Monitor drain output  - Drain amylases  - NOT on Whipple pathway    /RENAL   - IVL  - Strict I/Os  - Monitor BMP qd  - will d/c hernandez  - monitor electrolytes, replete PRN  - Lasix 40mg x 1 9/28    HEMATOLOGIC  - Monitor H/H   - DVT ppx SQH    INFECTIOUS DISEASE  - Monitor fever / WBC  - completed Cipro and Flagyl     ENDOCRINE  - Monitor gluc  - ISS    LINES  - PIV     DISPO: SICU

## 2022-09-29 NOTE — PROGRESS NOTE ADULT - SUBJECTIVE AND OBJECTIVE BOX
SICU DAILY PROGRESS NOTE  73 y/o female s/p Whipple 2/2 pancreatic cancer recovering in SICU post operatively. Extubated afternoon 09/24, reintubated o/n 09/24 2/2 hypoxic respiratory failure.     24 HOUR EVENTS:  - S/p extra Lopressor 5mg IVP x1 yesterday for persistent tachycardia  - NGT out   - will d/c hernandez  - POCUS 09/28: no B lines, IVC 1.3cm   - Central line and A line out  - Floors tomorrow   - Lasix 40mg PO x1      SUBJECTIVE/ROS:  [x] A ten-point review of systems was otherwise negative except as noted.  [] Due to altered mental status/intubation, subjective information were not able to be obtained from the patient. History was obtained, to the extent possible, from review of the chart and collateral sources of information.    MEDICATIONS  (STANDING):  acetaminophen     Tablet .. 1000 milliGRAM(s) Oral every 6 hours  artificial  tears Solution 1 Drop(s) Both EYES two times a day  chlorhexidine 4% Liquid 1 Application(s) Topical <User Schedule>  enoxaparin Injectable 40 milliGRAM(s) SubCutaneous every 24 hours  insulin lispro (ADMELOG) corrective regimen sliding scale   SubCutaneous Before meals and at bedtime  metoprolol tartrate 50 milliGRAM(s) Oral daily  risperiDONE   Tablet 2 milliGRAM(s) Oral at bedtime    MEDICATIONS  (PRN):  oxyCODONE    IR 5 milliGRAM(s) Oral every 4 hours PRN Moderate Pain (4 - 6)  oxyCODONE    IR 10 milliGRAM(s) Oral every 4 hours PRN Severe Pain (7 - 10)  sodium chloride 0.9% lock flush 10 milliLiter(s) IV Push every 1 hour PRN Pre/post blood products, medications, blood draw, and to maintain line patency    ICU Vital Signs Last 24 Hrs  T(C): 36.4 (29 Sep 2022 00:00), Max: 38.2 (28 Sep 2022 20:00)  T(F): 97.6 (29 Sep 2022 00:00), Max: 100.7 (28 Sep 2022 20:00)  HR: 99 (29 Sep 2022 00:00) (82 - 119)  BP: 125/52 (29 Sep 2022 00:00) (125/52 - 176/57)  BP(mean): 74 (29 Sep 2022 00:00) (68 - 98)  ABP: --  ABP(mean): --  RR: 20 (29 Sep 2022 00:00) (17 - 30)  SpO2: 93% (29 Sep 2022 00:00) (92% - 100%)    O2 Parameters below as of 29 Sep 2022 00:00  Patient On (Oxygen Delivery Method): room air    I&O's Detail    27 Sep 2022 07:01  -  28 Sep 2022 07:00  --------------------------------------------------------  IN:    Albumin 5%  - 250 mL: 250 mL    Dexmedetomidine: 11.4 mL    dextrose 5% + sodium chloride 0.45% w/ Additives: 200 mL    Glucerna 1.5: 210 mL    IV PiggyBack: 300 mL    Lactated Ringers Bolus: 250 mL  Total IN: 1221.4 mL    OUT:    Drain (mL): 135 mL    Drain (mL): 960 mL    Indwelling Catheter - Urethral (mL): 3530 mL  Total OUT: 4625 mL    Total NET: -3403.6 mL      28 Sep 2022 07:01  -  29 Sep 2022 00:16  --------------------------------------------------------  IN:    Glucerna 1.5: 30 mL    Oral Fluid: 425 mL  Total IN: 455 mL    OUT:    Drain (mL): 165 mL    Drain (mL): 50 mL    Indwelling Catheter - Urethral (mL): 945 mL  Total OUT: 1160 mL    Total NET: -705 mL      NEURO  Exam: awake, alert, oriented      RESPIRATORY  Exam: unlabored on face tent, clear to auscultation bilaterally    CARDIOVASCULAR  Exam: sinus tachycardia  Cardiac Rhythm:   Perfusion     [x]Adequate   [ ]Inadequate  Mentation   [x]Normal       [ ]Reduced  Extremities  [x]Warm         [ ]Cool  Volume Status [ ]Hypervolemic [x]Euvolemic [ ]Hypovolemic      GI/NUTRITION  Exam: soft, nontender, nondistended, incision C/D/I      ACCESS DEVICES:  [ ] Peripheral IV  [ ] Central Venous Line	[ ] R	[ ] L	[ ] IJ	[ ] Fem	[ ] SC	Placed:   [ ] Arterial Line		[ ] R	[ ] L	[ ] Fem	[ ] Rad	[ ] Ax	Placed:   [ ] PICC:					[ ] Mediport  [ ] Urinary Catheter, Date Placed:   [x] Necessity of urinary, arterial, and venous catheters discussed    Labs:  CBC (09-28 @ 01:30)                              9.9<L>                         17.03<H>  )----------------(  310        --    % Neutrophils, --    % Lymphocytes, ANC: --                                  30.8<L>    BMP (09-28 @ 01:30)             143     |  108<H>  |  23    		Ca++ --      Ca 8.3<L>             ---------------------------------( 172<H>		Mg 2.60               4.5     |  22      |  0.81  			Ph 2.6       LFTs (09-28 @ 01:30)      TPro 5.0<L> / Alb 2.1<L> / TBili 0.7 / DBili -- / AST 12 / ALT 12 / AlkPhos 118           SICU DAILY PROGRESS NOTE  73 y/o female s/p Whipple 2/2 pancreatic cancer recovering in SICU post operatively. Extubated afternoon 09/24, reintubated o/n 09/24 2/2 hypoxic respiratory failure.     24 HOUR EVENTS:  - S/p extra Lopressor 5mg IVP x1 yesterday for persistent tachycardia  - NGT out   - will d/c ehrnandez  - POCUS 09/28: no B lines, IVC 1.3cm   - Central line and A line out  - Floors tomorrow --> listed for floors now.   - Lasix 40mg PO x1  - Able to ambulate with worker with PT.       SUBJECTIVE/ROS:  [x] A ten-point review of systems was otherwise negative except as noted.  [] Due to altered mental status/intubation, subjective information were not able to be obtained from the patient. History was obtained, to the extent possible, from review of the chart and collateral sources of information.    MEDICATIONS  (STANDING):  acetaminophen     Tablet .. 1000 milliGRAM(s) Oral every 6 hours  artificial  tears Solution 1 Drop(s) Both EYES two times a day  chlorhexidine 4% Liquid 1 Application(s) Topical <User Schedule>  enoxaparin Injectable 40 milliGRAM(s) SubCutaneous every 24 hours  insulin lispro (ADMELOG) corrective regimen sliding scale   SubCutaneous Before meals and at bedtime  metoprolol tartrate 50 milliGRAM(s) Oral daily  risperiDONE   Tablet 2 milliGRAM(s) Oral at bedtime    MEDICATIONS  (PRN):  oxyCODONE    IR 5 milliGRAM(s) Oral every 4 hours PRN Moderate Pain (4 - 6)  oxyCODONE    IR 10 milliGRAM(s) Oral every 4 hours PRN Severe Pain (7 - 10)  sodium chloride 0.9% lock flush 10 milliLiter(s) IV Push every 1 hour PRN Pre/post blood products, medications, blood draw, and to maintain line patency    ICU Vital Signs Last 24 Hrs  T(C): 36.4 (29 Sep 2022 00:00), Max: 38.2 (28 Sep 2022 20:00)  T(F): 97.6 (29 Sep 2022 00:00), Max: 100.7 (28 Sep 2022 20:00)  HR: 99 (29 Sep 2022 00:00) (82 - 119)  BP: 125/52 (29 Sep 2022 00:00) (125/52 - 176/57)  BP(mean): 74 (29 Sep 2022 00:00) (68 - 98)  ABP: --  ABP(mean): --  RR: 20 (29 Sep 2022 00:00) (17 - 30)  SpO2: 93% (29 Sep 2022 00:00) (92% - 100%)    O2 Parameters below as of 29 Sep 2022 00:00  Patient On (Oxygen Delivery Method): room air    I&O's Detail    27 Sep 2022 07:01  -  28 Sep 2022 07:00  --------------------------------------------------------  IN:    Albumin 5%  - 250 mL: 250 mL    Dexmedetomidine: 11.4 mL    dextrose 5% + sodium chloride 0.45% w/ Additives: 200 mL    Glucerna 1.5: 210 mL    IV PiggyBack: 300 mL    Lactated Ringers Bolus: 250 mL  Total IN: 1221.4 mL    OUT:    Drain (mL): 135 mL    Drain (mL): 960 mL    Indwelling Catheter - Urethral (mL): 3530 mL  Total OUT: 4625 mL    Total NET: -3403.6 mL      28 Sep 2022 07:01  -  29 Sep 2022 00:16  --------------------------------------------------------  IN:    Glucerna 1.5: 30 mL    Oral Fluid: 425 mL  Total IN: 455 mL    OUT:    Drain (mL): 165 mL    Drain (mL): 50 mL    Indwelling Catheter - Urethral (mL): 945 mL  Total OUT: 1160 mL    Total NET: -705 mL      NEURO  Exam: awake, alert, oriented      RESPIRATORY  Exam: unlabored on face tent, clear to auscultation bilaterally    CARDIOVASCULAR  Exam: sinus tachycardia  Cardiac Rhythm:   Perfusion     [x]Adequate   [ ]Inadequate  Mentation   [x]Normal       [ ]Reduced  Extremities  [x]Warm         [ ]Cool  Volume Status [ ]Hypervolemic [x]Euvolemic [ ]Hypovolemic      GI/NUTRITION  Exam: soft, nontender, nondistended, incision C/D/I      ACCESS DEVICES:  [ ] Peripheral IV  [ ] Central Venous Line	[ ] R	[ ] L	[ ] IJ	[ ] Fem	[ ] SC	Placed:   [ ] Arterial Line		[ ] R	[ ] L	[ ] Fem	[ ] Rad	[ ] Ax	Placed:   [ ] PICC:					[ ] Mediport  [ ] Urinary Catheter, Date Placed:   [x] Necessity of urinary, arterial, and venous catheters discussed    Labs:  CBC (09-28 @ 01:30)                              9.9<L>                         17.03<H>  )----------------(  310        --    % Neutrophils, --    % Lymphocytes, ANC: --                                  30.8<L>    BMP (09-28 @ 01:30)             143     |  108<H>  |  23    		Ca++ --      Ca 8.3<L>             ---------------------------------( 172<H>		Mg 2.60               4.5     |  22      |  0.81  			Ph 2.6       LFTs (09-28 @ 01:30)      TPro 5.0<L> / Alb 2.1<L> / TBili 0.7 / DBili -- / AST 12 / ALT 12 / AlkPhos 118

## 2022-09-29 NOTE — PROGRESS NOTE ADULT - SUBJECTIVE AND OBJECTIVE BOX
TEAM Surgery Progress Note  Patient is a 72y old  Female who presents with a chief complaint of Pancreatic carcinoma (27 Sep 2022 13:45)      INTERVAL EVENTS: Patient is POD# ***No acute events overnight.  SUBJECTIVE: Patient seen and examined at bedside with surgical team, patient without complaints. Denies fever, chills, CP, SOB nausea, vomiting, abdominal pain.    REVIEW OF SYSTEMS:  Constitutional: No fevers or chills. No malaise or weakness.  EENT: No vision changes. No ear pain. No nasal congestion or rhinitis. No throat pain or dysphagia.  Respiratory: No cough, wheezing, or SOB. No hemoptysis.  Cardiovascular: No chest pain or palpitations.  Gastrointestinal: No abdominal pain. No nausea, vomiting, diarrhea or constipation. No hematochezia. No melena.  Genitourinary: No dysuria, hematuria, or frequency.  Neurologic: No numbness or tingling. No weakness.  Skin: No rashes or pruritus.     OBJECTIVE:    Vital Signs Last 24 Hrs  T(C): 36.4 (29 Sep 2022 00:00), Max: 38.2 (28 Sep 2022 20:00)  T(F): 97.6 (29 Sep 2022 00:00), Max: 100.7 (28 Sep 2022 20:00)  HR: 99 (29 Sep 2022 00:00) (82 - 119)  BP: 125/52 (29 Sep 2022 00:00) (125/52 - 176/57)  BP(mean): 74 (29 Sep 2022 00:00) (68 - 98)  RR: 20 (29 Sep 2022 00:00) (17 - 30)  SpO2: 93% (29 Sep 2022 00:00) (92% - 100%)    Parameters below as of 29 Sep 2022 00:00  Patient On (Oxygen Delivery Method): room air    I&O's Detail    27 Sep 2022 07:01  -  28 Sep 2022 07:00  --------------------------------------------------------  IN:    Albumin 5%  - 250 mL: 250 mL    Dexmedetomidine: 11.4 mL    dextrose 5% + sodium chloride 0.45% w/ Additives: 200 mL    Glucerna 1.5: 210 mL    IV PiggyBack: 300 mL    Lactated Ringers Bolus: 250 mL  Total IN: 1221.4 mL    OUT:    Drain (mL): 135 mL    Drain (mL): 960 mL    Indwelling Catheter - Urethral (mL): 3530 mL  Total OUT: 4625 mL    Total NET: -3403.6 mL      28 Sep 2022 07:01  -  29 Sep 2022 00:19  --------------------------------------------------------  IN:    Glucerna 1.5: 30 mL    Oral Fluid: 425 mL  Total IN: 455 mL    OUT:    Drain (mL): 165 mL    Drain (mL): 50 mL    Indwelling Catheter - Urethral (mL): 945 mL  Total OUT: 1160 mL    Total NET: -705 mL      MEDICATIONS  (STANDING):  acetaminophen     Tablet .. 1000 milliGRAM(s) Oral every 6 hours  artificial  tears Solution 1 Drop(s) Both EYES two times a day  chlorhexidine 4% Liquid 1 Application(s) Topical <User Schedule>  enoxaparin Injectable 40 milliGRAM(s) SubCutaneous every 24 hours  insulin lispro (ADMELOG) corrective regimen sliding scale   SubCutaneous Before meals and at bedtime  metoprolol tartrate 50 milliGRAM(s) Oral daily  risperiDONE   Tablet 2 milliGRAM(s) Oral at bedtime    MEDICATIONS  (PRN):  oxyCODONE    IR 5 milliGRAM(s) Oral every 4 hours PRN Moderate Pain (4 - 6)  oxyCODONE    IR 10 milliGRAM(s) Oral every 4 hours PRN Severe Pain (7 - 10)  sodium chloride 0.9% lock flush 10 milliLiter(s) IV Push every 1 hour PRN Pre/post blood products, medications, blood draw, and to maintain line patency      PHYSICAL EXAM:  Constitutional: A&Ox3, NAD  Respiratory: Unlabored breathing  Abdomen: Soft, nondistended, NTTP. No rebound or guarding.  Extremities: WWP, VENEGAS spontaneously    LABS:                        9.9    17.03 )-----------( 310      ( 28 Sep 2022 01:30 )             30.8     09-28    143  |  108<H>  |  23  ----------------------------<  172<H>  4.5   |  22  |  0.81    Ca    8.3<L>      28 Sep 2022 01:30  Phos  2.6     09-28  Mg     2.60     09-28    TPro  5.0<L>  /  Alb  2.1<L>  /  TBili  0.7  /  DBili  x   /  AST  12  /  ALT  12  /  AlkPhos  118  09-28    PT/INR - ( 27 Sep 2022 01:10 )   PT: 16.9 sec;   INR: 1.45 ratio         PTT - ( 27 Sep 2022 01:10 )  PTT:32.7 sec  LIVER FUNCTIONS - ( 28 Sep 2022 01:30 )  Alb: 2.1 g/dL / Pro: 5.0 g/dL / ALK PHOS: 118 U/L / ALT: 12 U/L / AST: 12 U/L / GGT: x                 IMAGING:     A TEAM Surgery Progress Note  Patient is a 72y old  Female who presents with a chief complaint of Pancreatic carcinoma (27 Sep 2022 13:45)      INTERVAL EVENTS: No acute events overnight.  - S/p extra Lopressor 5mg IVP x1 yesterday for persistent tachycardia  - NGT out   - will d/c hernandez  - POCUS 09/28: no B lines, IVC 1.3cm   - Central line and A line out  - Floors tomorrow --> listed for floors now.   - Lasix 40mg PO x1  - Able to ambulate with worker with PT.   SUBJECTIVE: Patient seen and examined at bedside with surgical team, patient without complaints.       OBJECTIVE:    Vital Signs Last 24 Hrs  T(C): 36.4 (29 Sep 2022 00:00), Max: 38.2 (28 Sep 2022 20:00)  T(F): 97.6 (29 Sep 2022 00:00), Max: 100.7 (28 Sep 2022 20:00)  HR: 99 (29 Sep 2022 00:00) (82 - 119)  BP: 125/52 (29 Sep 2022 00:00) (125/52 - 176/57)  BP(mean): 74 (29 Sep 2022 00:00) (68 - 98)  RR: 20 (29 Sep 2022 00:00) (17 - 30)  SpO2: 93% (29 Sep 2022 00:00) (92% - 100%)    Parameters below as of 29 Sep 2022 00:00  Patient On (Oxygen Delivery Method): room air    I&O's Detail    27 Sep 2022 07:01  -  28 Sep 2022 07:00  --------------------------------------------------------  IN:    Albumin 5%  - 250 mL: 250 mL    Dexmedetomidine: 11.4 mL    dextrose 5% + sodium chloride 0.45% w/ Additives: 200 mL    Glucerna 1.5: 210 mL    IV PiggyBack: 300 mL    Lactated Ringers Bolus: 250 mL  Total IN: 1221.4 mL    OUT:    Drain (mL): 135 mL    Drain (mL): 960 mL    Indwelling Catheter - Urethral (mL): 3530 mL  Total OUT: 4625 mL    Total NET: -3403.6 mL      28 Sep 2022 07:01  -  29 Sep 2022 00:19  --------------------------------------------------------  IN:    Glucerna 1.5: 30 mL    Oral Fluid: 425 mL  Total IN: 455 mL    OUT:    Drain (mL): 165 mL    Drain (mL): 50 mL    Indwelling Catheter - Urethral (mL): 945 mL  Total OUT: 1160 mL    Total NET: -705 mL      MEDICATIONS  (STANDING):  acetaminophen     Tablet .. 1000 milliGRAM(s) Oral every 6 hours  artificial  tears Solution 1 Drop(s) Both EYES two times a day  chlorhexidine 4% Liquid 1 Application(s) Topical <User Schedule>  enoxaparin Injectable 40 milliGRAM(s) SubCutaneous every 24 hours  insulin lispro (ADMELOG) corrective regimen sliding scale   SubCutaneous Before meals and at bedtime  metoprolol tartrate 50 milliGRAM(s) Oral daily  risperiDONE   Tablet 2 milliGRAM(s) Oral at bedtime    MEDICATIONS  (PRN):  oxyCODONE    IR 5 milliGRAM(s) Oral every 4 hours PRN Moderate Pain (4 - 6)  oxyCODONE    IR 10 milliGRAM(s) Oral every 4 hours PRN Severe Pain (7 - 10)  sodium chloride 0.9% lock flush 10 milliLiter(s) IV Push every 1 hour PRN Pre/post blood products, medications, blood draw, and to maintain line patency      PHYSICAL EXAM:  Constitutional: A&Ox3, NAD  Respiratory: Unlabored breathing  Abdomen: soft, non-distended. Wound - clean- with staples. MARGRET drains with serosanguinous output.  Extremities: WWP, VENEGAS spontaneously    LABS:                        9.9    17.03 )-----------( 310      ( 28 Sep 2022 01:30 )             30.8     09-28    143  |  108<H>  |  23  ----------------------------<  172<H>  4.5   |  22  |  0.81    Ca    8.3<L>      28 Sep 2022 01:30  Phos  2.6     09-28  Mg     2.60     09-28    TPro  5.0<L>  /  Alb  2.1<L>  /  TBili  0.7  /  DBili  x   /  AST  12  /  ALT  12  /  AlkPhos  118  09-28    PT/INR - ( 27 Sep 2022 01:10 )   PT: 16.9 sec;   INR: 1.45 ratio         PTT - ( 27 Sep 2022 01:10 )  PTT:32.7 sec  LIVER FUNCTIONS - ( 28 Sep 2022 01:30 )  Alb: 2.1 g/dL / Pro: 5.0 g/dL / ALK PHOS: 118 U/L / ALT: 12 U/L / AST: 12 U/L / GGT: x                 IMAGING:

## 2022-09-29 NOTE — PROGRESS NOTE ADULT - SUBJECTIVE AND OBJECTIVE BOX
POD # 7        Subjective:  I have apin in the right upper quadrant. I passed gas but no bowel movement yet.    Objective:   Vital Signs Last 24 Hrs  T(C): 36.8 (29 Sep 2022 12:00), Max: 38.2 (28 Sep 2022 20:00)  T(F): 98.2 (29 Sep 2022 12:00), Max: 100.7 (28 Sep 2022 20:00)  HR: 84 (29 Sep 2022 12:00) (84 - 119)  BP: 122/52 (29 Sep 2022 12:00) (105/45 - 169/64)  BP(mean): 72 (29 Sep 2022 12:00) (64 - 108)  RR: 16 (29 Sep 2022 12:00) (16 - 30)  SpO2: 93% (29 Sep 2022 12:00) (92% - 100%)    Parameters below as of 29 Sep 2022 12:00  Patient On (Oxygen Delivery Method): nasal cannula  O2 Flow (L/min): 2      Daily     Daily Weight in k (29 Sep 2022 02:00)    Heent: N/C, AT PER no scleral icterus, No JVD  Pul: clear  Cor: RRR  Abdomen: soft, non distended. non-tender, decreased                                                                                                                                                                                                                                                                                                                                                                                                                                                                                                                                                                                                                                                                                                                                                                                                                                                                                                                                                                                                                BS. Wound - clean - Drain output sero-sanguineous.  Extremities: with residual edema, motor/sensory intact    I&O's Detail    28 Sep 2022 07:01  -  29 Sep 2022 07:00  --------------------------------------------------------  IN:    Glucerna 1.5: 30 mL    Oral Fluid: 775 mL  Total IN: 805 mL    OUT:    Drain (mL): 90 mL    Drain (mL): 225 mL    Indwelling Catheter - Urethral (mL): 1045 mL    Voided (mL): 200 mL  Total OUT: 1560 mL    Total NET: -755 mL      29 Sep 2022 07:01  -  29 Sep 2022 13:26  --------------------------------------------------------  IN:    Oral Fluid: 400 mL  Total IN: 400 mL    OUT:    Drain (mL): 180 mL  Total OUT: 180 mL    Total NET: 220 mL          MEDICATIONS  (STANDING):  acetaminophen     Tablet .. 1000 milliGRAM(s) Oral every 6 hours  artificial  tears Solution 1 Drop(s) Both EYES two times a day  chlorhexidine 4% Liquid 1 Application(s) Topical <User Schedule>  enoxaparin Injectable 40 milliGRAM(s) SubCutaneous every 24 hours  insulin lispro (ADMELOG) corrective regimen sliding scale   SubCutaneous Before meals and at bedtime  metoprolol tartrate 50 milliGRAM(s) Oral daily  risperiDONE   Tablet 2 milliGRAM(s) Oral at bedtime    MEDICATIONS  (PRN):  oxyCODONE    IR 5 milliGRAM(s) Oral every 4 hours PRN Moderate Pain (4 - 6)  oxyCODONE    IR 10 milliGRAM(s) Oral every 4 hours PRN Severe Pain (7 - 10)  sodium chloride 0.9% lock flush 10 milliLiter(s) IV Push every 1 hour PRN Pre/post blood products, medications, blood draw, and to maintain line patency                            9.9    17.03 )-----------( 310      ( 28 Sep 2022 01:30 )             30.8         143  |  108<H>  |  23  ----------------------------<  172<H>  4.5   |  22  |  0.81    Ca    8.3<L>      28 Sep 2022 01:30  Phos  2.6       Mg     2.60         TPro  5.0<L>  /  Alb  2.1<L>  /  TBili  0.7  /  DBili  x   /  AST  12  /  ALT  12  /  AlkPhos  118          Radiologic Studies:

## 2022-09-29 NOTE — PROGRESS NOTE ADULT - ASSESSMENT
S/P Whipple procedure  Slow recovery associated wit anasarca  Extubated yesterday and with satisfactory O2 sat.  Noted leukocytosis

## 2022-09-29 NOTE — PROGRESS NOTE ADULT - ASSESSMENT
72F w/ diagnosis of pancreatic cancer on endoscopy, s/p exploratory laparotomy with Whipple procedure, and placement of drains R posterior and L anterior to the anastomoses on 9/23. Drain amylase downtrending.    Plan:   - NPO/IVF hydration  - IV abx: Cipro/Flagyl  - IVF @30cc/hr  - PRN analgesia  - Wean sedation/vent per SICU  - Continue hernandez  - Monitor I&Os  - Monitor MARGRET 1 and 2 drains output  - Monitor bowel function  - f/u AM labs  - f/u drain amylase  - DVT ppx  - Appreciate excellent care per SICU    A Team Surgery  pager: [phone]   72F w/ diagnosis of pancreatic cancer on endoscopy, s/p exploratory laparotomy with Whipple procedure, and placement of drains R posterior and L anterior to the anastomoses on 9/23. Drain amylase downtrending.    Plan:   - CLD  - IV abx: Cipro/Flagyl  - IVF @30cc/hr  - PRN analgesia  - Wean sedation/vent per SICU  - Continue hernandez  - Monitor I&Os  - Monitor MARGRET 1 and 2 drains output  - Monitor bowel function  - f/u AM labs  - f/u drain amylase  - DVT ppx  - Appreciate excellent care per SICU.  - Listed for the Floor    A Team Surgery  pager: [phone]

## 2022-09-30 LAB
ALBUMIN SERPL ELPH-MCNC: 2.2 G/DL — LOW (ref 3.3–5)
ALP SERPL-CCNC: 162 U/L — HIGH (ref 40–120)
ALT FLD-CCNC: 11 U/L — SIGNIFICANT CHANGE UP (ref 4–33)
AMYLASE FLD-CCNC: 114 U/L — SIGNIFICANT CHANGE UP
AMYLASE FLD-CCNC: > 7500 U/L — SIGNIFICANT CHANGE UP
ANION GAP SERPL CALC-SCNC: 7 MMOL/L — SIGNIFICANT CHANGE UP (ref 7–14)
APPEARANCE UR: CLEAR — SIGNIFICANT CHANGE UP
APTT BLD: 30.8 SEC — SIGNIFICANT CHANGE UP (ref 27–36.3)
AST SERPL-CCNC: 16 U/L — SIGNIFICANT CHANGE UP (ref 4–32)
BACTERIA # UR AUTO: NEGATIVE — SIGNIFICANT CHANGE UP
BILIRUB SERPL-MCNC: 0.4 MG/DL — SIGNIFICANT CHANGE UP (ref 0.2–1.2)
BILIRUB UR-MCNC: NEGATIVE — SIGNIFICANT CHANGE UP
BUN SERPL-MCNC: 19 MG/DL — SIGNIFICANT CHANGE UP (ref 7–23)
CALCIUM SERPL-MCNC: 8.7 MG/DL — SIGNIFICANT CHANGE UP (ref 8.4–10.5)
CHLORIDE SERPL-SCNC: 105 MMOL/L — SIGNIFICANT CHANGE UP (ref 98–107)
CO2 SERPL-SCNC: 28 MMOL/L — SIGNIFICANT CHANGE UP (ref 22–31)
COLOR SPEC: YELLOW — SIGNIFICANT CHANGE UP
CREAT SERPL-MCNC: 0.65 MG/DL — SIGNIFICANT CHANGE UP (ref 0.5–1.3)
DIFF PNL FLD: NEGATIVE — SIGNIFICANT CHANGE UP
EGFR: 93 ML/MIN/1.73M2 — SIGNIFICANT CHANGE UP
EPI CELLS # UR: 2 /HPF — SIGNIFICANT CHANGE UP (ref 0–5)
GLUCOSE BLDC GLUCOMTR-MCNC: 148 MG/DL — HIGH (ref 70–99)
GLUCOSE BLDC GLUCOMTR-MCNC: 149 MG/DL — HIGH (ref 70–99)
GLUCOSE BLDC GLUCOMTR-MCNC: 165 MG/DL — HIGH (ref 70–99)
GLUCOSE BLDC GLUCOMTR-MCNC: 187 MG/DL — HIGH (ref 70–99)
GLUCOSE BLDC GLUCOMTR-MCNC: 201 MG/DL — HIGH (ref 70–99)
GLUCOSE BLDC GLUCOMTR-MCNC: 242 MG/DL — HIGH (ref 70–99)
GLUCOSE SERPL-MCNC: 169 MG/DL — HIGH (ref 70–99)
GLUCOSE UR QL: ABNORMAL
HCT VFR BLD CALC: 30.5 % — LOW (ref 34.5–45)
HGB BLD-MCNC: 9.5 G/DL — LOW (ref 11.5–15.5)
INR BLD: 1.4 RATIO — HIGH (ref 0.88–1.16)
KETONES UR-MCNC: NEGATIVE — SIGNIFICANT CHANGE UP
LEUKOCYTE ESTERASE UR-ACNC: ABNORMAL
MAGNESIUM SERPL-MCNC: 2.2 MG/DL — SIGNIFICANT CHANGE UP (ref 1.6–2.6)
MCHC RBC-ENTMCNC: 29.1 PG — SIGNIFICANT CHANGE UP (ref 27–34)
MCHC RBC-ENTMCNC: 31.1 GM/DL — LOW (ref 32–36)
MCV RBC AUTO: 93.3 FL — SIGNIFICANT CHANGE UP (ref 80–100)
NITRITE UR-MCNC: NEGATIVE — SIGNIFICANT CHANGE UP
NRBC # BLD: 0 /100 WBCS — SIGNIFICANT CHANGE UP (ref 0–0)
NRBC # FLD: 0 K/UL — SIGNIFICANT CHANGE UP (ref 0–0)
PH UR: 5.5 — SIGNIFICANT CHANGE UP (ref 5–8)
PHOSPHATE SERPL-MCNC: 2.6 MG/DL — SIGNIFICANT CHANGE UP (ref 2.5–4.5)
PLATELET # BLD AUTO: 392 K/UL — SIGNIFICANT CHANGE UP (ref 150–400)
POTASSIUM SERPL-MCNC: 4.1 MMOL/L — SIGNIFICANT CHANGE UP (ref 3.5–5.3)
POTASSIUM SERPL-SCNC: 4.1 MMOL/L — SIGNIFICANT CHANGE UP (ref 3.5–5.3)
PROT SERPL-MCNC: 5.2 G/DL — LOW (ref 6–8.3)
PROT UR-MCNC: NEGATIVE — SIGNIFICANT CHANGE UP
PROTHROM AB SERPL-ACNC: 16.3 SEC — HIGH (ref 10.5–13.4)
RBC # BLD: 3.27 M/UL — LOW (ref 3.8–5.2)
RBC # FLD: 13.2 % — SIGNIFICANT CHANGE UP (ref 10.3–14.5)
RBC CASTS # UR COMP ASSIST: 5 /HPF — HIGH (ref 0–4)
SODIUM SERPL-SCNC: 140 MMOL/L — SIGNIFICANT CHANGE UP (ref 135–145)
SP GR SPEC: 1.03 — SIGNIFICANT CHANGE UP (ref 1.01–1.05)
TRIGL FLD-MCNC: 211 MG/DL — SIGNIFICANT CHANGE UP
UROBILINOGEN FLD QL: SIGNIFICANT CHANGE UP
WBC # BLD: 19.52 K/UL — HIGH (ref 3.8–10.5)
WBC # FLD AUTO: 19.52 K/UL — HIGH (ref 3.8–10.5)
WBC UR QL: 22 /HPF — HIGH (ref 0–5)

## 2022-09-30 PROCEDURE — 99232 SBSQ HOSP IP/OBS MODERATE 35: CPT

## 2022-09-30 PROCEDURE — 74177 CT ABD & PELVIS W/CONTRAST: CPT | Mod: 26

## 2022-09-30 RX ORDER — OCTREOTIDE ACETATE 200 UG/ML
100 INJECTION, SOLUTION INTRAVENOUS; SUBCUTANEOUS DAILY
Refills: 0 | Status: DISCONTINUED | OUTPATIENT
Start: 2022-09-30 | End: 2022-10-15

## 2022-09-30 RX ADMIN — CHLORHEXIDINE GLUCONATE 1 APPLICATION(S): 213 SOLUTION TOPICAL at 05:07

## 2022-09-30 RX ADMIN — Medication 1 DROP(S): at 17:02

## 2022-09-30 RX ADMIN — Medication 4: at 17:01

## 2022-09-30 RX ADMIN — Medication 1000 MILLIGRAM(S): at 05:00

## 2022-09-30 RX ADMIN — Medication 1000 MILLIGRAM(S): at 23:00

## 2022-09-30 RX ADMIN — Medication 1 DROP(S): at 05:08

## 2022-09-30 RX ADMIN — Medication 1000 MILLIGRAM(S): at 11:58

## 2022-09-30 RX ADMIN — INSULIN GLARGINE 9 UNIT(S): 100 INJECTION, SOLUTION SUBCUTANEOUS at 01:37

## 2022-09-30 RX ADMIN — OCTREOTIDE ACETATE 100 MICROGRAM(S): 200 INJECTION, SOLUTION INTRAVENOUS; SUBCUTANEOUS at 17:00

## 2022-09-30 RX ADMIN — OXYCODONE HYDROCHLORIDE 10 MILLIGRAM(S): 5 TABLET ORAL at 05:00

## 2022-09-30 RX ADMIN — OXYCODONE HYDROCHLORIDE 10 MILLIGRAM(S): 5 TABLET ORAL at 09:02

## 2022-09-30 RX ADMIN — RISPERIDONE 2 MILLIGRAM(S): 4 TABLET ORAL at 22:36

## 2022-09-30 RX ADMIN — Medication 2: at 12:09

## 2022-09-30 RX ADMIN — Medication 1000 MILLIGRAM(S): at 11:42

## 2022-09-30 RX ADMIN — Medication 1000 MILLIGRAM(S): at 17:00

## 2022-09-30 RX ADMIN — OXYCODONE HYDROCHLORIDE 10 MILLIGRAM(S): 5 TABLET ORAL at 05:30

## 2022-09-30 RX ADMIN — ENOXAPARIN SODIUM 40 MILLIGRAM(S): 100 INJECTION SUBCUTANEOUS at 12:09

## 2022-09-30 RX ADMIN — Medication 1000 MILLIGRAM(S): at 00:00

## 2022-09-30 RX ADMIN — Medication 50 MILLIGRAM(S): at 05:00

## 2022-09-30 RX ADMIN — Medication 1000 MILLIGRAM(S): at 05:30

## 2022-09-30 RX ADMIN — OXYCODONE HYDROCHLORIDE 10 MILLIGRAM(S): 5 TABLET ORAL at 09:32

## 2022-09-30 RX ADMIN — INSULIN GLARGINE 9 UNIT(S): 100 INJECTION, SOLUTION SUBCUTANEOUS at 22:36

## 2022-09-30 NOTE — PROGRESS NOTE ADULT - SUBJECTIVE AND OBJECTIVE BOX
Surgery Progress Note    SUBJECTIVE: Pt seen and examined at bedside. Patient comfortable and in no-apparent distress. No nausea, vomiting, diarrhea. Pain is controlled.  Tolerating CL diet.    Vital Signs Last 24 Hrs  T(C): 36.9 (30 Sep 2022 08:00), Max: 37.4 (30 Sep 2022 00:00)  T(F): 98.4 (30 Sep 2022 08:00), Max: 99.4 (30 Sep 2022 00:00)  HR: 93 (30 Sep 2022 12:00) (78 - 116)  BP: 111/41 (30 Sep 2022 12:00) (111/41 - 152/54)  BP(mean): 62 (30 Sep 2022 12:00) (62 - 119)  RR: 21 (30 Sep 2022 12:00) (12 - 24)  SpO2: 97% (30 Sep 2022 12:00) (91% - 100%)    Parameters below as of 30 Sep 2022 12:00    O2 Flow (L/min): 2      Physical Exam:  General Appearance: Appears well, NAD  Respiratory: No labored breathing  CV: Pulse regularly present  Abdomen: Soft, nontender.     LABS:                        9.5    19.52 )-----------( 392      ( 30 Sep 2022 05:11 )             30.5     09-30    140  |  105  |  19  ----------------------------<  169<H>  4.1   |  28  |  0.65    Ca    8.7      30 Sep 2022 05:11  Phos  2.6     09-30  Mg     2.20     09-30    TPro  5.2<L>  /  Alb  2.2<L>  /  TBili  0.4  /  DBili  x   /  AST  16  /  ALT  11  /  AlkPhos  162<H>  09-30    PT/INR - ( 30 Sep 2022 05:11 )   PT: 16.3 sec;   INR: 1.40 ratio         PTT - ( 30 Sep 2022 05:11 )  PTT:30.8 sec  Urinalysis Basic - ( 30 Sep 2022 01:34 )    Color: Yellow / Appearance: Clear / S.033 / pH: x  Gluc: x / Ketone: Negative  / Bili: Negative / Urobili: <2 mg/dL   Blood: x / Protein: Negative / Nitrite: Negative   Leuk Esterase: Small / RBC: 5 /HPF / WBC 22 /HPF   Sq Epi: x / Non Sq Epi: 2 /HPF / Bacteria: Negative        INs and OUTs:    22 @ 07:01  -  22 @ 07:00  --------------------------------------------------------  IN: 1090 mL / OUT: 1870 mL / NET: -780 mL    22 @ 07:  -  22 @ 15:38  --------------------------------------------------------  IN: 240 mL / OUT: 715 mL / NET: -475 mL     Surgery Progress Note    SUBJECTIVE: Pt seen and examined at bedside. Patient comfortable and in no-apparent distress. No nausea, vomiting, diarrhea. Pain is controlled.  Tolerating CL diet.    Vital Signs Last 24 Hrs  T(C): 36.9 (30 Sep 2022 08:00), Max: 37.4 (30 Sep 2022 00:00)  T(F): 98.4 (30 Sep 2022 08:00), Max: 99.4 (30 Sep 2022 00:00)  HR: 93 (30 Sep 2022 12:00) (78 - 116)  BP: 111/41 (30 Sep 2022 12:00) (111/41 - 152/54)  BP(mean): 62 (30 Sep 2022 12:00) (62 - 119)  RR: 21 (30 Sep 2022 12:00) (12 - 24)  SpO2: 97% (30 Sep 2022 12:00) (91% - 100%)    Parameters below as of 30 Sep 2022 12:00    O2 Flow (L/min): 2      Physical Exam:  General Appearance: Appears well, NAD  Respiratory: No labored breathing  CV: Pulse regularly present  Abdomen: Soft, nontender. MARGRET drain putting out white/yellow material.     LABS:                        9.5    19.52 )-----------( 392      ( 30 Sep 2022 05:11 )             30.5     09-30    140  |  105  |  19  ----------------------------<  169<H>  4.1   |  28  |  0.65    Ca    8.7      30 Sep 2022 05:11  Phos  2.6     09-30  Mg     2.20     09-30    TPro  5.2<L>  /  Alb  2.2<L>  /  TBili  0.4  /  DBili  x   /  AST  16  /  ALT  11  /  AlkPhos  162<H>  09-30    PT/INR - ( 30 Sep 2022 05:11 )   PT: 16.3 sec;   INR: 1.40 ratio         PTT - ( 30 Sep 2022 05:11 )  PTT:30.8 sec  Urinalysis Basic - ( 30 Sep 2022 01:34 )    Color: Yellow / Appearance: Clear / S.033 / pH: x  Gluc: x / Ketone: Negative  / Bili: Negative / Urobili: <2 mg/dL   Blood: x / Protein: Negative / Nitrite: Negative   Leuk Esterase: Small / RBC: 5 /HPF / WBC 22 /HPF   Sq Epi: x / Non Sq Epi: 2 /HPF / Bacteria: Negative        INs and OUTs:    22 @ 07:01  -  22 @ 07:00  --------------------------------------------------------  IN: 1090 mL / OUT: 1870 mL / NET: -780 mL    22 @ 07:  -  22 @ 15:38  --------------------------------------------------------  IN: 240 mL / OUT: 715 mL / NET: -475 mL

## 2022-09-30 NOTE — CHART NOTE - NSCHARTNOTEFT_GEN_A_CORE
SICU Transfer Note    Transfer from: SICU  Transfer to: [address] [place] [address]  Accepting physican:      Subjective: Patient feeling well, pain well controlled. Now in her room in [facility]. Tolerating CLD. Denies fever, chills, CP, SOB, headache.      Vital Signs Last 24 Hrs  T(C): 37.5 (30 Sep 2022 17:50), Max: 37.5 (30 Sep 2022 17:50)  T(F): 99.5 (30 Sep 2022 17:50), Max: 99.5 (30 Sep 2022 17:50)  HR: 90 (30 Sep 2022 17:50) (83 - 116)  BP: 147/50 (30 Sep 2022 17:50) (111/41 - 152/54)  BP(mean): 70 (30 Sep 2022 16:00) (62 - 92)  RR: 19 (30 Sep 2022 17:50) (13 - 22)  SpO2: 100% (30 Sep 2022 17:50) (91% - 100%)    Parameters below as of 30 Sep 2022 17:50  Patient On (Oxygen Delivery Method): nasal cannula  O2 Flow (L/min): 2    I&O's Summary    29 Sep 2022 07:01  -  30 Sep 2022 07:00  --------------------------------------------------------  IN: 1090 mL / OUT: 1870 mL / NET: -780 mL    30 Sep 2022 07:01  -  30 Sep 2022 18:48  --------------------------------------------------------  IN: 840 mL / OUT: 1345 mL / NET: -505 mL      Exam:  Gen: NAD  CV: RRR  Resp: good effort, no increased WOB  Abd: soft, nondistended, mild tenderness MARGRET drain x2 draining serosanguinous fluid.                 MEDICATIONS  (STANDING):  acetaminophen     Tablet .. 1000 milliGRAM(s) Oral every 6 hours  artificial  tears Solution 1 Drop(s) Both EYES two times a day  chlorhexidine 4% Liquid 1 Application(s) Topical <User Schedule>  enoxaparin Injectable 40 milliGRAM(s) SubCutaneous every 24 hours  insulin glargine Injectable (LANTUS) 9 Unit(s) SubCutaneous at bedtime  insulin lispro (ADMELOG) corrective regimen sliding scale   SubCutaneous Before meals and at bedtime  metoprolol tartrate 50 milliGRAM(s) Oral daily  octreotide  Injectable 100 MICROGram(s) SubCutaneous daily  risperiDONE   Tablet 2 milliGRAM(s) Oral at bedtime    MEDICATIONS  (PRN):  oxyCODONE    IR 5 milliGRAM(s) Oral every 4 hours PRN Moderate Pain (4 - 6)  oxyCODONE    IR 10 milliGRAM(s) Oral every 4 hours PRN Severe Pain (7 - 10)  sodium chloride 0.9% lock flush 10 milliLiter(s) IV Push every 1 hour PRN Pre/post blood products, medications, blood draw, and to maintain line patency        LABS                                            9.5                   Neurophils% (auto):   x      (09-30 @ 05:11):    19.52)-----------(392          Lymphocytes% (auto):  x                                             30.5                   Eosinphils% (auto):   x        Manual%: Neutrophils x    ; Lymphocytes x    ; Eosinophils x    ; Bands%: x    ; Blasts x                                    140    |  105    |  19                  Calcium: 8.7   / iCa: x      (09-30 @ 05:11)    ----------------------------<  169       Magnesium: 2.20                             4.1     |  28     |  0.65             Phosphorous: 2.6      TPro  5.2    /  Alb  2.2    /  TBili  0.4    /  DBili  x      /  AST  16     /  ALT  11     /  AlkPhos  162    30 Sep 2022 05:11    ( 09-30 @ 05:11 )   PT: 16.3 sec;   INR: 1.40 ratio  aPTT: 30.8 sec      Assessment: 72F w/ diagnosis of pancreatic cancer on endoscopy, s/p exploratory laparotomy with Whipple procedure, and placement of drains R posterior and L anterior to the anastomoses on 9/23.       Plan:  - f/u drain TG, amylase, cholesterol  - c/w octreotide  - CLD  - f/u CT A/P  - Lantus 9u qhs for uncontrolled sugars  - pain control PRN

## 2022-09-30 NOTE — PROGRESS NOTE ADULT - SUBJECTIVE AND OBJECTIVE BOX
SICU Daily Progress Note  =====================================================  Interval/Overnight Events:      - no acute events overnight    Allergies: penicillin (Other)      MEDICATIONS:   --------------------------------------------------------------------------------------  Neurologic Medications  acetaminophen     Tablet .. 1000 milliGRAM(s) Oral every 6 hours  oxyCODONE    IR 5 milliGRAM(s) Oral every 4 hours PRN Moderate Pain (4 - 6)  oxyCODONE    IR 10 milliGRAM(s) Oral every 4 hours PRN Severe Pain (7 - 10)  risperiDONE   Tablet 2 milliGRAM(s) Oral at bedtime    Respiratory Medications    Cardiovascular Medications  metoprolol tartrate 50 milliGRAM(s) Oral daily    Gastrointestinal Medications  sodium chloride 0.9% lock flush 10 milliLiter(s) IV Push every 1 hour PRN Pre/post blood products, medications, blood draw, and to maintain line patency    Genitourinary Medications    Hematologic/Oncologic Medications  enoxaparin Injectable 40 milliGRAM(s) SubCutaneous every 24 hours    Antimicrobial/Immunologic Medications    Endocrine/Metabolic Medications  insulin glargine Injectable (LANTUS) 9 Unit(s) SubCutaneous at bedtime  insulin lispro (ADMELOG) corrective regimen sliding scale   SubCutaneous Before meals and at bedtime    Topical/Other Medications  artificial  tears Solution 1 Drop(s) Both EYES two times a day  chlorhexidine 4% Liquid 1 Application(s) Topical <User Schedule>    --------------------------------------------------------------------------------------    VITAL SIGNS, INS/OUTS (last 24 hours):  --------------------------------------------------------------------------------------  I&O's Detail    28 Sep 2022 07:01  -  29 Sep 2022 07:00  --------------------------------------------------------  IN:    Glucerna 1.5: 30 mL    Oral Fluid: 775 mL  Total IN: 805 mL    OUT:    Drain (mL): 90 mL    Drain (mL): 225 mL    Indwelling Catheter - Urethral (mL): 1045 mL    Voided (mL): 200 mL  Total OUT: 1560 mL    Total NET: -755 mL      29 Sep 2022 07:01  -  30 Sep 2022 00:29  --------------------------------------------------------  IN:    Oral Fluid: 1040 mL  Total IN: 1040 mL    OUT:    Drain (mL): 80 mL    Drain (mL): 720 mL    Voided (mL): 1050 mL  Total OUT: 1850 mL    Total NET: -810 mL    --------------------------------------------------------------------------------------    EXAM  NEUROLOGY  RASS:   	GCS:    Exam: Normal, NAD, alert, oriented x3.      HEENT  Exam: Normocephalic, atraumatic, EOMI.      RESPIRATORY  Exam: Normal expansion/effort.      CARDIOVASCULAR  Exam: S1, S2.  Regular rate and rhythm.       GI/NUTRITION  Exam: Abdomen soft, Non-tender, Non-distended. Incision c/d/i.    VASCULAR  Exam: Extremities warm, pink, well-perfused.     MUSCULOSKELETAL  Exam: All extremities moving spontaneously without limitations.     SKIN  Exam: Good skin turgor, no skin breakdown.     METABOLIC/FLUIDS/ELECTROLYTES      HEMATOLOGIC  [x] VTE Prophylaxis: enoxaparin Injectable 40 milliGRAM(s) SubCutaneous every 24 hours    Transfusions:	[] PRBC	[] Platelets		[] FFP	[] Cryoprecipitate    INFECTIOUS DISEASE  Antimicrobials/Immunologic Medications:    Tubes/Lines/Drains   [x] Peripheral IV  [] Central Venous Line     	[] R	[] L	[] IJ	[] Fem	[] SC	Date Placed:   [] Arterial Line		[] R	[] L	[] Fem	[] Rad	[] Ax	Date Placed:   [] PICC		[] Midline		[] Mediport  [] Urinary Catheter		Date Placed:   [x] Necessity of urinary, arterial, and venous catheters discussed    LABS  --------------------------------------------------------------------------------------    --------------------------------------------------------------------------------------    OTHER LABORATORY:     IMAGING STUDIES:   CXR:    SICU Daily Progress Note  =====================================================  Interval/Overnight Events:      - Lantus 9 given for , 290    Allergies: penicillin (Other)      MEDICATIONS:   --------------------------------------------------------------------------------------  Neurologic Medications  acetaminophen     Tablet .. 1000 milliGRAM(s) Oral every 6 hours  oxyCODONE    IR 5 milliGRAM(s) Oral every 4 hours PRN Moderate Pain (4 - 6)  oxyCODONE    IR 10 milliGRAM(s) Oral every 4 hours PRN Severe Pain (7 - 10)  risperiDONE   Tablet 2 milliGRAM(s) Oral at bedtime    Respiratory Medications    Cardiovascular Medications  metoprolol tartrate 50 milliGRAM(s) Oral daily    Gastrointestinal Medications  sodium chloride 0.9% lock flush 10 milliLiter(s) IV Push every 1 hour PRN Pre/post blood products, medications, blood draw, and to maintain line patency    Genitourinary Medications    Hematologic/Oncologic Medications  enoxaparin Injectable 40 milliGRAM(s) SubCutaneous every 24 hours    Antimicrobial/Immunologic Medications    Endocrine/Metabolic Medications  insulin glargine Injectable (LANTUS) 9 Unit(s) SubCutaneous at bedtime  insulin lispro (ADMELOG) corrective regimen sliding scale   SubCutaneous Before meals and at bedtime    Topical/Other Medications  artificial  tears Solution 1 Drop(s) Both EYES two times a day  chlorhexidine 4% Liquid 1 Application(s) Topical <User Schedule>    --------------------------------------------------------------------------------------    VITAL SIGNS, INS/OUTS (last 24 hours):  --------------------------------------------------------------------------------------  I&O's Detail    28 Sep 2022 07:01  -  29 Sep 2022 07:00  --------------------------------------------------------  IN:    Glucerna 1.5: 30 mL    Oral Fluid: 775 mL  Total IN: 805 mL    OUT:    Drain (mL): 90 mL    Drain (mL): 225 mL    Indwelling Catheter - Urethral (mL): 1045 mL    Voided (mL): 200 mL  Total OUT: 1560 mL    Total NET: -755 mL      29 Sep 2022 07:01  -  30 Sep 2022 00:29  --------------------------------------------------------  IN:    Oral Fluid: 1040 mL  Total IN: 1040 mL    OUT:    Drain (mL): 80 mL    Drain (mL): 720 mL    Voided (mL): 1050 mL  Total OUT: 1850 mL    Total NET: -810 mL    --------------------------------------------------------------------------------------    EXAM  NEUROLOGY  RASS:   	GCS:    Exam: Normal, NAD, alert, oriented x3.      HEENT  Exam: Normocephalic, atraumatic, EOMI.      RESPIRATORY  Exam: Normal expansion/effort.      CARDIOVASCULAR  Exam: S1, S2.  Regular rate and rhythm.       GI/NUTRITION  Exam: Abdomen soft, Non-tender, Non-distended. Incision c/d/i. Right MARGRET draining chylous fluid.     VASCULAR  Exam: Extremities warm, pink, well-perfused.     MUSCULOSKELETAL  Exam: All extremities moving spontaneously without limitations.     SKIN  Exam: Good skin turgor, no skin breakdown.     METABOLIC/FLUIDS/ELECTROLYTES      HEMATOLOGIC  [x] VTE Prophylaxis: enoxaparin Injectable 40 milliGRAM(s) SubCutaneous every 24 hours    Transfusions:	[] PRBC	[] Platelets		[] FFP	[] Cryoprecipitate    INFECTIOUS DISEASE  Antimicrobials/Immunologic Medications:    Tubes/Lines/Drains   [x] Peripheral IV  [] Central Venous Line     	[] R	[] L	[] IJ	[] Fem	[] SC	Date Placed:   [] Arterial Line		[] R	[] L	[] Fem	[] Rad	[] Ax	Date Placed:   [] PICC		[] Midline		[] Mediport  [] Urinary Catheter		Date Placed:   [x] Necessity of urinary, arterial, and venous catheters discussed    LABS                        9.5    19.52 )-----------( 392      ( 30 Sep 2022 05:11 )             30.5       09-30    140  |  105  |  19  ----------------------------<  169<H>  4.1   |  28  |  0.65    Ca    8.7      30 Sep 2022 05:11  Phos  2.6     09-30  Mg     2.20     09-30    TPro  5.2<L>  /  Alb  2.2<L>  /  TBili  0.4  /  DBili  x   /  AST  16  /  ALT  11  /  AlkPhos  162<H>  09-30      PT/INR - ( 30 Sep 2022 05:11 )   PT: 16.3 sec;   INR: 1.40 ratio         PTT - ( 30 Sep 2022 05:11 )  PTT:30.8 sec        ( 27 Sep 2022 01:10 ) pH: 7.40  /  pCO2: 33    /  pO2: 115   / HCO3: 20    / Base Excess: -3.8  /  SaO2: 99.2            ( 26 Sep 2022 15:25 ) pH: 7.41  /  pCO2: 33    /  pO2: 141   / HCO3: 21    / Base Excess: -2.9  /  SaO2: 99.4            ( 26 Sep 2022 01:15 ) pH: 7.45  /  pCO2: 31    /  pO2: 135   / HCO3: 22    / Base Excess: -1.7  /  SaO2: 99.0    --------------------------------------------------------------------------------------

## 2022-09-30 NOTE — PROGRESS NOTE ADULT - ASSESSMENT
S/P Whipple's procedure  Rising WBC but no fever or any other signs of sepsis.  Persistent elevation of amylase in right drain output.  Malnutrition with low serum albumin and significant preoperative weight loss.

## 2022-09-30 NOTE — CHART NOTE - NSCHARTNOTEFT_GEN_A_CORE
Patient stable for surgical floor and no longer necessitates critical care needs per SICU team and attending. Patient going to [id number] [id number]. Patient signed out to A team resident.

## 2022-09-30 NOTE — PROGRESS NOTE ADULT - NUTRITIONAL ASSESSMENT
NPO at this moment
To start on po in addition to tube feeding.
on clear Ensure x 3/day and clear liquid diet.
NPO but with significant pre-operative nutritional depletion.  We would consider TPN early on, if she does not pass stool in 5 days.
Started on trickle tube feed.

## 2022-09-30 NOTE — PROGRESS NOTE ADULT - ASSESSMENT
72F w/ diagnosis of pancreatic cancer on endoscopy, s/p exploratory laparotomy with Whipple procedure, and placement of drains R posterior and L anterior to the anastomoses on 9/23. Drain amylase downtrending.    Plan:   - CLD  - IV abx: Cipro/Flagyl  - IVF @30cc/hr  - PRN analgesia  - Wean sedation/vent per SICU  - Continue hernandez  - Monitor I&Os  - Monitor MARGRET 1 and 2 drains output  - Monitor bowel function  - f/u AM labs  - f/u drain amylase  - DVT ppx  - Appreciate excellent care per SICU.  - Listed for the Floor    A Team Surgery  pager: [phone]   72F w/ diagnosis of pancreatic cancer on endoscopy, s/p exploratory laparotomy with Whipple procedure, and placement of drains R posterior and L anterior to the anastomoses on 9/23. Drain output high.     Plan:   - CLD  - IV abx: Cipro/Flagyl  - octreotide  - f/u drain amylase, triglycerides, cholesterol  - PRN analgesia  - hernandez out 9/29  - Monitor I&Os  - Monitor MARGRET 1 and 2 drains output  - Monitor bowel function  - f/u AM labs  - DVT ppx  - Appreciate excellent care per SICU.  - Listed for the Floor    A Team Surgery  pager: [phone]

## 2022-09-30 NOTE — PROGRESS NOTE ADULT - SUBJECTIVE AND OBJECTIVE BOX
POD # 8      Subjective: I am still having some pain in the right upper quadrant. Tylenol does not help me. I had a liquid diet today and passed some flatus, but no stool.    Objective:   Vital Signs Last 24 Hrs  T(C): 36.6 (30 Sep 2022 20:45), Max: 37.5 (30 Sep 2022 17:50)  T(F): 97.9 (30 Sep 2022 20:45), Max: 99.5 (30 Sep 2022 17:50)  HR: 89 (30 Sep 2022 20:45) (83 - 116)  BP: 132/41 (30 Sep 2022 20:45) (111/41 - 152/54)  BP(mean): 70 (30 Sep 2022 16:00) (62 - 90)  RR: 18 (30 Sep 2022 20:45) (13 - 22)  SpO2: 100% (30 Sep 2022 20:45) (96% - 100%)    Parameters below as of 30 Sep 2022 20:45  Patient On (Oxygen Delivery Method): nasal cannula        Daily     Daily Weight in k.9 (30 Sep 2022 04:00)    Heent: N/C, AT PER no scleral icterus, No JVD  Pul: clear  Cor: RRR  Abdomen: soft, non distended, slight tenderness in the right upper quadrant, normal BS. Wound - clean with no erythema, swelling or tenderness.  Extremities: without edema, motor/sensory intact    I&O's Detail    29 Sep 2022 07:01  -  30 Sep 2022 07:00  --------------------------------------------------------  IN:    Oral Fluid: 1090 mL  Total IN: 1090 mL    OUT:    Drain (mL): 80 mL    Drain (mL): 740 mL    Voided (mL): 1050 mL  Total OUT: 1870 mL    Total NET: -780 mL      30 Sep 2022 07:01  -  30 Sep 2022 22:31  --------------------------------------------------------  IN:    Oral Fluid: 840 mL  Total IN: 840 mL    OUT:    Drain (mL): 45 mL    Drain (mL): 550 mL    Voided (mL): 750 mL  Total OUT: 1345 mL    Total NET: -505 mL          MEDICATIONS  (STANDING):  acetaminophen     Tablet .. 1000 milliGRAM(s) Oral every 6 hours  artificial  tears Solution 1 Drop(s) Both EYES two times a day  chlorhexidine 4% Liquid 1 Application(s) Topical <User Schedule>  enoxaparin Injectable 40 milliGRAM(s) SubCutaneous every 24 hours  insulin glargine Injectable (LANTUS) 9 Unit(s) SubCutaneous at bedtime  insulin lispro (ADMELOG) corrective regimen sliding scale   SubCutaneous Before meals and at bedtime  metoprolol tartrate 50 milliGRAM(s) Oral daily  octreotide  Injectable 100 MICROGram(s) SubCutaneous daily  risperiDONE   Tablet 2 milliGRAM(s) Oral at bedtime    MEDICATIONS  (PRN):  oxyCODONE    IR 5 milliGRAM(s) Oral every 4 hours PRN Moderate Pain (4 - 6)  oxyCODONE    IR 10 milliGRAM(s) Oral every 4 hours PRN Severe Pain (7 - 10)  sodium chloride 0.9% lock flush 10 milliLiter(s) IV Push every 1 hour PRN Pre/post blood products, medications, blood draw, and to maintain line patency                            9.5    19.52 )-----------( 392      ( 30 Sep 2022 05:11 )             30.5     09-30    140  |  105  |  19  ----------------------------<  169<H>  4.1   |  28  |  0.65    Ca    8.7      30 Sep 2022 05:11  Phos  2.6     -  Mg     2.20     -    TPro  5.2<L>  /  Alb  2.2<L>  /  TBili  0.4  /  DBili  x   /  AST  16  /  ALT  11  /  AlkPhos  162<H>  09-30    PT/INR - ( 30 Sep 2022 05:11 )   PT: 16.3 sec;   INR: 1.40 ratio         PTT - ( 30 Sep 2022 05:11 )  PTT:30.8 sec    Radiologic Studies:

## 2022-09-30 NOTE — PROGRESS NOTE ADULT - ASSESSMENT
· Assessment	  Plan  NEUROLOGIC   - Pain control: Tylenol atc. oxy PRN  - c/w home risperidone    RESPIRATORY   - Extubated 09/24 afternoon, reintubated 09/24 due to Hypoxic respiratory failure w CXR demonstrating L lung white out. S/p Bronchoscopy  - Extubated 09/28, RSBI 38  - now on room air    CARDIOVASCULAR   - MAP> 65  - q4 Vitals   - metoprolol 50mg daily    GASTROINTESTINAL   - Diet: CLD, ensure cans BID  - Monitor bowel function  - Monitor drain output  - Drain amylases  - NOT on Whipple pathway    /RENAL   - IVL  - Strict I/Os  - Monitor BMP qd  - Melgar removed, passed TOV  - monitor electrolytes, replete PRN  - Lasix 40mg x 1 9/28    HEMATOLOGIC  - Monitor H/H   - DVT ppx SQH    INFECTIOUS DISEASE  - Monitor fever / WBC  - completed Cipro and Flagyl     ENDOCRINE  - Monitor gluc  - ISS    LINES  - PIV     DISPO: SICU

## 2022-09-30 NOTE — CHART NOTE - NSCHARTNOTEFT_GEN_A_CORE
Patient assessed by this RDN on 9/23, now for nutrition follow up. Spoke with Team and obtained subjective information from extensive chart review.     Current Diet : Diet, Clear Liquid:   MCT Oil (HNT Only)     Qty per Day:  45ml (supplies 405 lipid calories/day)  Supplement Feeding Modality:  Oral  Ensure Clear Cans or Servings Per Day:  3       Frequency:  Daily (09-30-22 @ 13:28)    PO intake: 50-75%     Current Weight: 80.9 kg (9/30), 82 kg (9/29), 74.1 kg (9/25), 79 kg (9/23)  Height (cm): 165.1   IBW (kg): 58/6  BMI (kg/m2): 27.6     Nutrition Interval Events: Pt now with suspected chyle leak from her MARGRET drain. Milky fluid is coming out of drain which is in the process of being confirmed for chyle. Spoke with Team at length regarding diet order. Team would like to stick with Clear Liquid diet at this time, however they requested suggestion for liquid nutrition supplement and lipid provision. Suggested Ensure Clear 3x daily (540 mercedez and 24 gm protein) with MCT oil; MCT oil to meet approx 30% of pt's estimated calorie need. Recommended 45 mL MCT oil, divided into 1 Tb, 3 x/day. Also suggested advancing diet, when feasible, to very Low Fat to avoid further exacerbation of chyle leak. No GI distress at present - pt with hypoactive bowel sounds. Weight trend reflective of fluid shifts with edema now 1+ generalized. Continue to monitor MARGRET drain output, diet tolerance, skin, BMs and edema. RDN services to remain available as needed.     __________________ Pertinent Medications__________________   MEDICATIONS  (STANDING):  acetaminophen     Tablet .. 1000 milliGRAM(s) Oral every 6 hours  artificial  tears Solution 1 Drop(s) Both EYES two times a day  chlorhexidine 4% Liquid 1 Application(s) Topical <User Schedule>  enoxaparin Injectable 40 milliGRAM(s) SubCutaneous every 24 hours  insulin glargine Injectable (LANTUS) 9 Unit(s) SubCutaneous at bedtime  insulin lispro (ADMELOG) corrective regimen sliding scale   SubCutaneous Before meals and at bedtime  metoprolol tartrate 50 milliGRAM(s) Oral daily  octreotide  Injectable 100 MICROGram(s) SubCutaneous daily  risperiDONE   Tablet 2 milliGRAM(s) Oral at bedtime      __________________ Pertinent Labs__________________   09-30 Na140 mmol/L Glu 169 mg/dL<H> K+ 4.1 mmol/L Cr  0.65 mg/dL BUN 19 mg/dL 09-30 Phos 2.6 mg/dL 09-30 Alb 2.2 g/dL<L>        Skin: Intact    Estimated Needs:     1465 - 1758 kcals/day (25-30 kcals/kg IBW)  70 - 82 gms protein/day (1.2-1.4 gms protein/kg IBW)    Previous Nutrition Diagnoses:     Inadequate Energy Intake   Altered GI Function     Nutrition Diagnosis is [x] ongoing        Goal(s):  1. Patient to meet > 75% estimated energy needs    Recommendations:   1. Ensure Clear 3x daily (540 mercedez and 24 gm protein).   2. MCT oil, 45 gms/day, divided into 1 Tb, 3 x/day.  3. Suggest diet advancement to very Low Fat.    Monitoring and Evaluation:   1. Monitor weights, labs, BMs, skin integrity, PO tolerance and edema.  2. RD services to remain available.

## 2022-10-01 LAB
ALBUMIN SERPL ELPH-MCNC: 2.1 G/DL — LOW (ref 3.3–5)
ALP SERPL-CCNC: 153 U/L — HIGH (ref 40–120)
ALT FLD-CCNC: 10 U/L — SIGNIFICANT CHANGE UP (ref 4–33)
AMYLASE FLD-CCNC: > 7500 U/L — SIGNIFICANT CHANGE UP
AMYLASE FLD-CCNC: >7500 U/L — SIGNIFICANT CHANGE UP
ANION GAP SERPL CALC-SCNC: 7 MMOL/L — SIGNIFICANT CHANGE UP (ref 7–14)
APTT BLD: 29.5 SEC — SIGNIFICANT CHANGE UP (ref 27–36.3)
AST SERPL-CCNC: 15 U/L — SIGNIFICANT CHANGE UP (ref 4–32)
BILIRUB SERPL-MCNC: 0.3 MG/DL — SIGNIFICANT CHANGE UP (ref 0.2–1.2)
BUN SERPL-MCNC: 16 MG/DL — SIGNIFICANT CHANGE UP (ref 7–23)
CALCIUM SERPL-MCNC: 8.6 MG/DL — SIGNIFICANT CHANGE UP (ref 8.4–10.5)
CHLORIDE SERPL-SCNC: 103 MMOL/L — SIGNIFICANT CHANGE UP (ref 98–107)
CO2 SERPL-SCNC: 28 MMOL/L — SIGNIFICANT CHANGE UP (ref 22–31)
CREAT SERPL-MCNC: 0.61 MG/DL — SIGNIFICANT CHANGE UP (ref 0.5–1.3)
EGFR: 95 ML/MIN/1.73M2 — SIGNIFICANT CHANGE UP
GLUCOSE BLDC GLUCOMTR-MCNC: 139 MG/DL — HIGH (ref 70–99)
GLUCOSE BLDC GLUCOMTR-MCNC: 173 MG/DL — HIGH (ref 70–99)
GLUCOSE BLDC GLUCOMTR-MCNC: 173 MG/DL — HIGH (ref 70–99)
GLUCOSE BLDC GLUCOMTR-MCNC: 188 MG/DL — HIGH (ref 70–99)
GLUCOSE SERPL-MCNC: 121 MG/DL — HIGH (ref 70–99)
HCT VFR BLD CALC: 30.5 % — LOW (ref 34.5–45)
HGB BLD-MCNC: 9.4 G/DL — LOW (ref 11.5–15.5)
INR BLD: 1.33 RATIO — HIGH (ref 0.88–1.16)
MAGNESIUM SERPL-MCNC: 1.9 MG/DL — SIGNIFICANT CHANGE UP (ref 1.6–2.6)
MCHC RBC-ENTMCNC: 28.8 PG — SIGNIFICANT CHANGE UP (ref 27–34)
MCHC RBC-ENTMCNC: 30.8 GM/DL — LOW (ref 32–36)
MCV RBC AUTO: 93.6 FL — SIGNIFICANT CHANGE UP (ref 80–100)
NRBC # BLD: 0 /100 WBCS — SIGNIFICANT CHANGE UP (ref 0–0)
NRBC # FLD: 0 K/UL — SIGNIFICANT CHANGE UP (ref 0–0)
PHOSPHATE SERPL-MCNC: 2.8 MG/DL — SIGNIFICANT CHANGE UP (ref 2.5–4.5)
PLATELET # BLD AUTO: 401 K/UL — HIGH (ref 150–400)
POTASSIUM SERPL-MCNC: 3.9 MMOL/L — SIGNIFICANT CHANGE UP (ref 3.5–5.3)
POTASSIUM SERPL-SCNC: 3.9 MMOL/L — SIGNIFICANT CHANGE UP (ref 3.5–5.3)
PROT SERPL-MCNC: 4.9 G/DL — LOW (ref 6–8.3)
PROTHROM AB SERPL-ACNC: 15.5 SEC — HIGH (ref 10.5–13.4)
RBC # BLD: 3.26 M/UL — LOW (ref 3.8–5.2)
RBC # FLD: 13.3 % — SIGNIFICANT CHANGE UP (ref 10.3–14.5)
SODIUM SERPL-SCNC: 138 MMOL/L — SIGNIFICANT CHANGE UP (ref 135–145)
WBC # BLD: 16.9 K/UL — HIGH (ref 3.8–10.5)
WBC # FLD AUTO: 16.9 K/UL — HIGH (ref 3.8–10.5)

## 2022-10-01 RX ORDER — MAGNESIUM SULFATE 500 MG/ML
1 VIAL (ML) INJECTION ONCE
Refills: 0 | Status: COMPLETED | OUTPATIENT
Start: 2022-10-01 | End: 2022-10-01

## 2022-10-01 RX ORDER — SODIUM,POTASSIUM PHOSPHATES 278-250MG
1 POWDER IN PACKET (EA) ORAL ONCE
Refills: 0 | Status: COMPLETED | OUTPATIENT
Start: 2022-10-01 | End: 2022-10-01

## 2022-10-01 RX ADMIN — Medication 1000 MILLIGRAM(S): at 06:35

## 2022-10-01 RX ADMIN — Medication 1 DROP(S): at 05:35

## 2022-10-01 RX ADMIN — Medication 2: at 13:01

## 2022-10-01 RX ADMIN — Medication 100 GRAM(S): at 13:01

## 2022-10-01 RX ADMIN — Medication 2: at 18:27

## 2022-10-01 RX ADMIN — OCTREOTIDE ACETATE 100 MICROGRAM(S): 200 INJECTION, SOLUTION INTRAVENOUS; SUBCUTANEOUS at 21:41

## 2022-10-01 RX ADMIN — INSULIN GLARGINE 9 UNIT(S): 100 INJECTION, SOLUTION SUBCUTANEOUS at 22:12

## 2022-10-01 RX ADMIN — Medication 1000 MILLIGRAM(S): at 05:34

## 2022-10-01 RX ADMIN — CHLORHEXIDINE GLUCONATE 1 APPLICATION(S): 213 SOLUTION TOPICAL at 05:35

## 2022-10-01 RX ADMIN — Medication 1 DROP(S): at 18:27

## 2022-10-01 RX ADMIN — Medication 1000 MILLIGRAM(S): at 00:00

## 2022-10-01 RX ADMIN — Medication 2: at 22:13

## 2022-10-01 RX ADMIN — ENOXAPARIN SODIUM 40 MILLIGRAM(S): 100 INJECTION SUBCUTANEOUS at 12:00

## 2022-10-01 RX ADMIN — Medication 50 MILLIGRAM(S): at 05:34

## 2022-10-01 RX ADMIN — Medication 1 PACKET(S): at 13:01

## 2022-10-01 RX ADMIN — Medication 1000 MILLIGRAM(S): at 18:27

## 2022-10-01 RX ADMIN — RISPERIDONE 2 MILLIGRAM(S): 4 TABLET ORAL at 21:43

## 2022-10-01 RX ADMIN — Medication 1000 MILLIGRAM(S): at 13:02

## 2022-10-01 NOTE — PROGRESS NOTE ADULT - SUBJECTIVE AND OBJECTIVE BOX
POD # 9    Subjective: I have pain in my left upper quadrant. I had a bowel movement this morning    Objective:   Vital Signs Last 24 Hrs  T(C): 37.1 (01 Oct 2022 14:00), Max: 37.5 (30 Sep 2022 17:50)  T(F): 98.8 (01 Oct 2022 14:00), Max: 99.5 (30 Sep 2022 17:50)  HR: 83 (01 Oct 2022 14:00) (83 - 97)  BP: 139/54 (01 Oct 2022 14:00) (132/41 - 160/57)  BP(mean): 70 (30 Sep 2022 16:00) (70 - 70)  RR: 17 (01 Oct 2022 14:00) (17 - 22)  SpO2: 100% (01 Oct 2022 14:00) (97% - 100%)    Parameters below as of 01 Oct 2022 14:00  Patient On (Oxygen Delivery Method): room air        Daily     Daily Weight in k.6 (01 Oct 2022 05:32)    Heent: N/C, AT PER no scleral icterus, No JVD  Pul: clear  Cor: RRR  Abdomen: soft, tender to palpation inf both upper quadrant, normal BS. Wound - clean with no tenderness, no swelling and no erythema.  Extremities: without edema, motor/sensory intact    I&O's Detail    30 Sep 2022 07:01  -  01 Oct 2022 07:00  --------------------------------------------------------  IN:    Oral Fluid: 840 mL  Total IN: 840 mL    OUT:    Drain (mL): 75 mL    Drain (mL): 650 mL    Voided (mL): 1500 mL  Total OUT: 2225 mL    Total NET: -1385 mL          MEDICATIONS  (STANDING):  acetaminophen     Tablet .. 1000 milliGRAM(s) Oral every 6 hours  artificial  tears Solution 1 Drop(s) Both EYES two times a day  chlorhexidine 4% Liquid 1 Application(s) Topical <User Schedule>  enoxaparin Injectable 40 milliGRAM(s) SubCutaneous every 24 hours  insulin glargine Injectable (LANTUS) 9 Unit(s) SubCutaneous at bedtime  insulin lispro (ADMELOG) corrective regimen sliding scale   SubCutaneous Before meals and at bedtime  metoprolol tartrate 50 milliGRAM(s) Oral daily  octreotide  Injectable 100 MICROGram(s) SubCutaneous daily  risperiDONE   Tablet 2 milliGRAM(s) Oral at bedtime    MEDICATIONS  (PRN):  oxyCODONE    IR 5 milliGRAM(s) Oral every 4 hours PRN Moderate Pain (4 - 6)  oxyCODONE    IR 10 milliGRAM(s) Oral every 4 hours PRN Severe Pain (7 - 10)  sodium chloride 0.9% lock flush 10 milliLiter(s) IV Push every 1 hour PRN Pre/post blood products, medications, blood draw, and to maintain line patency                            9.4    16.90 )-----------( 401      ( 01 Oct 2022 06:35 )             30.5     10-    138  |  103  |  16  ----------------------------<  121<H>  3.9   |  28  |  0.61    Ca    8.6      01 Oct 2022 06:35  Phos  2.8     10-  Mg     1.90     10-    TPro  4.9<L>  /  Alb  2.1<L>  /  TBili  0.3  /  DBili  x   /  AST  15  /  ALT  10  /  AlkPhos  153<H>  10-01    PT/INR - ( 01 Oct 2022 06:35 )   PT: 15.5 sec;   INR: 1.33 ratio         PTT - ( 01 Oct 2022 06:35 )  PTT:29.5 sec    Radiologic Studies:< from: CT Abdomen and Pelvis w/ IV Cont (22 @ 22:40) >    Status post Whipple procedure with multiple fluid collections in the   postoperative bed measuring up to 5.2 x 3.1 cm, likely seromas. No wall   thickening or mural hyperenhancement to suggest abscess.    < end of copied text >

## 2022-10-01 NOTE — PROGRESS NOTE ADULT - ASSESSMENT
S/P Whipple's procedure  Faye-pancreatic fluid collection - NO evidence of abscess formation  Possible pancreatico-jejunostomy leak  Malnutrition

## 2022-10-01 NOTE — PROGRESS NOTE ADULT - SUBJECTIVE AND OBJECTIVE BOX
Surgery Progress Note    SUBJECTIVE: Pt seen and examined at bedside. Patient comfortable and in no-apparent distress. No nausea, vomiting, diarrhea. Pain is controlled.       Vital Signs Last 24 Hrs  T(C): 37.1 (01 Oct 2022 14:00), Max: 37.4 (01 Oct 2022 01:19)  T(F): 98.8 (01 Oct 2022 14:00), Max: 99.4 (01 Oct 2022 01:19)  HR: 83 (01 Oct 2022 14:00) (83 - 97)  BP: 139/54 (01 Oct 2022 14:00) (132/41 - 160/57)  BP(mean): --  RR: 17 (01 Oct 2022 14:00) (17 - 18)  SpO2: 100% (01 Oct 2022 14:00) (100% - 100%)    Parameters below as of 01 Oct 2022 14:00  Patient On (Oxygen Delivery Method): room air        Physical Exam:  General Appearance: Appears well, NAD  Respiratory: No labored breathing  CV: Pulse regularly present  Abdomen: Soft, mildly tender, incision c/d/i. MARGRET drain putting out clear-yellow fluid.       LABS:                        9.4    16.90 )-----------( 401      ( 01 Oct 2022 06:35 )             30.5     10-    138  |  103  |  16  ----------------------------<  121<H>  3.9   |  28  |  0.61    Ca    8.6      01 Oct 2022 06:35  Phos  2.8     10-  Mg     1.90     10-    TPro  4.9<L>  /  Alb  2.1<L>  /  TBili  0.3  /  DBili  x   /  AST  15  /  ALT  10  /  AlkPhos  153<H>  10-01    PT/INR - ( 01 Oct 2022 06:35 )   PT: 15.5 sec;   INR: 1.33 ratio         PTT - ( 01 Oct 2022 06:35 )  PTT:29.5 sec  Urinalysis Basic - ( 30 Sep 2022 01:34 )    Color: Yellow / Appearance: Clear / S.033 / pH: x  Gluc: x / Ketone: Negative  / Bili: Negative / Urobili: <2 mg/dL   Blood: x / Protein: Negative / Nitrite: Negative   Leuk Esterase: Small / RBC: 5 /HPF / WBC 22 /HPF   Sq Epi: x / Non Sq Epi: 2 /HPF / Bacteria: Negative        INs and OUTs:    22 @ 07:01  -  10-01-22 @ 07:00  --------------------------------------------------------  IN: 840 mL / OUT: 2225 mL / NET: -1385 mL

## 2022-10-01 NOTE — PROGRESS NOTE ADULT - ASSESSMENT
72F w/ diagnosis of pancreatic cancer on endoscopy, s/p exploratory laparotomy with Whipple procedure, and placement of drains R posterior and L anterior to the anastomoses on 9/23. Drain output high. Successfully transferred to the floor 9/30.    Plan:   - Reg diet  - IV abx: Cipro/Flagyl  - octreotide  - f/u drain amylase, triglycerides, cholesterol, and cx  - PRN analgesia  - hernandez out 9/29  - Monitor I&Os  - Monitor MARGRET 1 and 2 drains output  - Monitor bowel function  - f/u AM labs  - DVT ppx  -     A Team Surgery  pager: [phone]

## 2022-10-02 LAB
ALBUMIN SERPL ELPH-MCNC: 2 G/DL — LOW (ref 3.3–5)
ALP SERPL-CCNC: 198 U/L — HIGH (ref 40–120)
ALT FLD-CCNC: 11 U/L — SIGNIFICANT CHANGE UP (ref 4–33)
AMYLASE FLD-CCNC: 50 U/L — SIGNIFICANT CHANGE UP
ANION GAP SERPL CALC-SCNC: 10 MMOL/L — SIGNIFICANT CHANGE UP (ref 7–14)
AST SERPL-CCNC: 17 U/L — SIGNIFICANT CHANGE UP (ref 4–32)
BILIRUB SERPL-MCNC: 0.3 MG/DL — SIGNIFICANT CHANGE UP (ref 0.2–1.2)
BUN SERPL-MCNC: 16 MG/DL — SIGNIFICANT CHANGE UP (ref 7–23)
CALCIUM SERPL-MCNC: 8.4 MG/DL — SIGNIFICANT CHANGE UP (ref 8.4–10.5)
CHLORIDE SERPL-SCNC: 104 MMOL/L — SIGNIFICANT CHANGE UP (ref 98–107)
CO2 SERPL-SCNC: 25 MMOL/L — SIGNIFICANT CHANGE UP (ref 22–31)
CREAT SERPL-MCNC: 0.6 MG/DL — SIGNIFICANT CHANGE UP (ref 0.5–1.3)
EGFR: 95 ML/MIN/1.73M2 — SIGNIFICANT CHANGE UP
GLUCOSE BLDC GLUCOMTR-MCNC: 138 MG/DL — HIGH (ref 70–99)
GLUCOSE BLDC GLUCOMTR-MCNC: 166 MG/DL — HIGH (ref 70–99)
GLUCOSE BLDC GLUCOMTR-MCNC: 176 MG/DL — HIGH (ref 70–99)
GLUCOSE BLDC GLUCOMTR-MCNC: 221 MG/DL — HIGH (ref 70–99)
GLUCOSE SERPL-MCNC: 181 MG/DL — HIGH (ref 70–99)
GRAM STN FLD: SIGNIFICANT CHANGE UP
GRAM STN FLD: SIGNIFICANT CHANGE UP
HCT VFR BLD CALC: 29.9 % — LOW (ref 34.5–45)
HGB BLD-MCNC: 9.3 G/DL — LOW (ref 11.5–15.5)
MAGNESIUM SERPL-MCNC: 2.1 MG/DL — SIGNIFICANT CHANGE UP (ref 1.6–2.6)
MCHC RBC-ENTMCNC: 29 PG — SIGNIFICANT CHANGE UP (ref 27–34)
MCHC RBC-ENTMCNC: 31.1 GM/DL — LOW (ref 32–36)
MCV RBC AUTO: 93.1 FL — SIGNIFICANT CHANGE UP (ref 80–100)
NRBC # BLD: 0 /100 WBCS — SIGNIFICANT CHANGE UP (ref 0–0)
NRBC # FLD: 0 K/UL — SIGNIFICANT CHANGE UP (ref 0–0)
PHOSPHATE SERPL-MCNC: 2.8 MG/DL — SIGNIFICANT CHANGE UP (ref 2.5–4.5)
PLATELET # BLD AUTO: 447 K/UL — HIGH (ref 150–400)
POTASSIUM SERPL-MCNC: 4.5 MMOL/L — SIGNIFICANT CHANGE UP (ref 3.5–5.3)
POTASSIUM SERPL-SCNC: 4.5 MMOL/L — SIGNIFICANT CHANGE UP (ref 3.5–5.3)
PROT SERPL-MCNC: 4.9 G/DL — LOW (ref 6–8.3)
RBC # BLD: 3.21 M/UL — LOW (ref 3.8–5.2)
RBC # FLD: 13.2 % — SIGNIFICANT CHANGE UP (ref 10.3–14.5)
SODIUM SERPL-SCNC: 139 MMOL/L — SIGNIFICANT CHANGE UP (ref 135–145)
SPECIMEN SOURCE: SIGNIFICANT CHANGE UP
SPECIMEN SOURCE: SIGNIFICANT CHANGE UP
WBC # BLD: 15.55 K/UL — HIGH (ref 3.8–10.5)
WBC # FLD AUTO: 15.55 K/UL — HIGH (ref 3.8–10.5)

## 2022-10-02 PROCEDURE — 74019 RADEX ABDOMEN 2 VIEWS: CPT | Mod: 26

## 2022-10-02 RX ORDER — ACETAMINOPHEN 500 MG
1000 TABLET ORAL ONCE
Refills: 0 | Status: COMPLETED | OUTPATIENT
Start: 2022-10-02 | End: 2022-10-02

## 2022-10-02 RX ORDER — ONDANSETRON 8 MG/1
4 TABLET, FILM COATED ORAL ONCE
Refills: 0 | Status: COMPLETED | OUTPATIENT
Start: 2022-10-02 | End: 2022-10-02

## 2022-10-02 RX ORDER — SODIUM,POTASSIUM PHOSPHATES 278-250MG
1 POWDER IN PACKET (EA) ORAL ONCE
Refills: 0 | Status: COMPLETED | OUTPATIENT
Start: 2022-10-02 | End: 2022-10-02

## 2022-10-02 RX ADMIN — CHLORHEXIDINE GLUCONATE 1 APPLICATION(S): 213 SOLUTION TOPICAL at 06:47

## 2022-10-02 RX ADMIN — Medication 1 DROP(S): at 06:46

## 2022-10-02 RX ADMIN — Medication 1 PACKET(S): at 17:00

## 2022-10-02 RX ADMIN — Medication 50 MILLIGRAM(S): at 06:16

## 2022-10-02 RX ADMIN — OCTREOTIDE ACETATE 100 MICROGRAM(S): 200 INJECTION, SOLUTION INTRAVENOUS; SUBCUTANEOUS at 17:02

## 2022-10-02 RX ADMIN — Medication 1000 MILLIGRAM(S): at 12:39

## 2022-10-02 RX ADMIN — Medication 1000 MILLIGRAM(S): at 00:30

## 2022-10-02 RX ADMIN — Medication 2: at 09:07

## 2022-10-02 RX ADMIN — ENOXAPARIN SODIUM 40 MILLIGRAM(S): 100 INJECTION SUBCUTANEOUS at 12:39

## 2022-10-02 RX ADMIN — Medication 4: at 12:39

## 2022-10-02 RX ADMIN — Medication 400 MILLIGRAM(S): at 23:33

## 2022-10-02 RX ADMIN — Medication 1000 MILLIGRAM(S): at 06:16

## 2022-10-02 RX ADMIN — Medication 1000 MILLIGRAM(S): at 00:02

## 2022-10-02 RX ADMIN — Medication 2: at 18:56

## 2022-10-02 RX ADMIN — ONDANSETRON 4 MILLIGRAM(S): 8 TABLET, FILM COATED ORAL at 22:51

## 2022-10-02 RX ADMIN — Medication 1000 MILLIGRAM(S): at 23:50

## 2022-10-02 RX ADMIN — Medication 1 DROP(S): at 17:01

## 2022-10-02 RX ADMIN — INSULIN GLARGINE 9 UNIT(S): 100 INJECTION, SOLUTION SUBCUTANEOUS at 22:40

## 2022-10-02 RX ADMIN — Medication 1000 MILLIGRAM(S): at 17:01

## 2022-10-02 RX ADMIN — Medication 1000 MILLIGRAM(S): at 06:46

## 2022-10-02 NOTE — PROGRESS NOTE ADULT - SUBJECTIVE AND OBJECTIVE BOX
POD # 10    Subjective: I just started having some crampy upper abdominal pain. I passed flatus and stools today    Objective:   Vital Signs Last 24 Hrs  T(C): 37.2 (02 Oct 2022 16:55), Max: 37.4 (01 Oct 2022 18:20)  T(F): 98.9 (02 Oct 2022 16:55), Max: 99.4 (02 Oct 2022 09:14)  HR: 96 (02 Oct 2022 16:55) (85 - 100)  BP: 148/47 (02 Oct 2022 16:55) (121/46 - 148/47)  BP(mean): --  RR: 20 (02 Oct 2022 16:55) (17 - 20)  SpO2: 99% (02 Oct 2022 16:55) (98% - 99%)    Parameters below as of 02 Oct 2022 12:47  Patient On (Oxygen Delivery Method): room air        Daily     Daily Weight in k.1 (02 Oct 2022 04:00)    Heent: N/C, AT PER no scleral icterus, No JVD  Pul: clear  Cor: RRR  Abdomen: soft, distended in the upper abdomen. no direct tenderness, guarding or rebound, decreased BS. Wound - clean with no erythema of local tenderness  Bilateral drains in place with milky drainage on the right and sero-sanguineous discharge on the left side. Both drains were stripped.  Extremities: without edema, motor/sensory intact    I&O's Detail    01 Oct 2022 07:01  -  02 Oct 2022 07:00  --------------------------------------------------------  IN:    Oral Fluid: 450 mL  Total IN: 450 mL    OUT:    Drain (mL): 105 mL    Drain (mL): 340 mL    Voided (mL): 1500 mL  Total OUT: 1945 mL    Total NET: -1495 mL      02 Oct 2022 07:01  -  02 Oct 2022 17:32  --------------------------------------------------------  IN:  Total IN: 0 mL    OUT:    Drain (mL): 110 mL    Drain (mL): 20 mL    Voided (mL): 800 mL  Total OUT: 930 mL    Total NET: -930 mL          MEDICATIONS  (STANDING):  acetaminophen     Tablet .. 1000 milliGRAM(s) Oral every 6 hours  artificial  tears Solution 1 Drop(s) Both EYES two times a day  chlorhexidine 4% Liquid 1 Application(s) Topical <User Schedule>  enoxaparin Injectable 40 milliGRAM(s) SubCutaneous every 24 hours  insulin glargine Injectable (LANTUS) 9 Unit(s) SubCutaneous at bedtime  insulin lispro (ADMELOG) corrective regimen sliding scale   SubCutaneous Before meals and at bedtime  metoprolol tartrate 50 milliGRAM(s) Oral daily  octreotide  Injectable 100 MICROGram(s) SubCutaneous daily  risperiDONE   Tablet 2 milliGRAM(s) Oral at bedtime    MEDICATIONS  (PRN):  oxyCODONE    IR 10 milliGRAM(s) Oral every 4 hours PRN Severe Pain (7 - 10)  oxyCODONE    IR 5 milliGRAM(s) Oral every 4 hours PRN Moderate Pain (4 - 6)  sodium chloride 0.9% lock flush 10 milliLiter(s) IV Push every 1 hour PRN Pre/post blood products, medications, blood draw, and to maintain line patency                            9.3    15.55 )-----------( 447      ( 02 Oct 2022 05:15 )             29.9     10-02    139  |  104  |  16  ----------------------------<  181<H>  4.5   |  25  |  0.60    Ca    8.4      02 Oct 2022 05:15  Phos  2.8     10-02  Mg     2.10     10-    TPro  4.9<L>  /  Alb  2.0<L>  /  TBili  0.3  /  DBili  x   /  AST  17  /  ALT  11  /  AlkPhos  198<H>  10-02    PT/INR - ( 01 Oct 2022 06:35 )   PT: 15.5 sec;   INR: 1.33 ratio         PTT - ( 01 Oct 2022 06:35 )  PTT:29.5 sec    Radiologic Studies:

## 2022-10-02 NOTE — PROGRESS NOTE ADULT - SUBJECTIVE AND OBJECTIVE BOX
GENERAL SURGERY PROGRESS NOTE    STATUS POST:    POST OPERATIVE DAY #:       SUBJECTIVE    OVERNIGHT EVENTS:    10-point review of systems completed and negative except as noted above.      OBJECTIVE    MEDICATIONS  acetaminophen     Tablet .. 1000 milliGRAM(s) Oral every 6 hours  artificial  tears Solution 1 Drop(s) Both EYES two times a day  chlorhexidine 4% Liquid 1 Application(s) Topical <User Schedule>  enoxaparin Injectable 40 milliGRAM(s) SubCutaneous every 24 hours  insulin glargine Injectable (LANTUS) 9 Unit(s) SubCutaneous at bedtime  insulin lispro (ADMELOG) corrective regimen sliding scale   SubCutaneous Before meals and at bedtime  metoprolol tartrate 50 milliGRAM(s) Oral daily  octreotide  Injectable 100 MICROGram(s) SubCutaneous daily  oxyCODONE    IR 10 milliGRAM(s) Oral every 4 hours PRN  oxyCODONE    IR 5 milliGRAM(s) Oral every 4 hours PRN  risperiDONE   Tablet 2 milliGRAM(s) Oral at bedtime  sodium chloride 0.9% lock flush 10 milliLiter(s) IV Push every 1 hour PRN      PHYSICAL EXAM  T(C): 37.1 (10-02-22 @ 01:08), Max: 37.4 (10-01-22 @ 18:20)  HR: 92 (10-02-22 @ 01:08) (83 - 97)  BP: 121/46 (10-02-22 @ 01:08) (121/46 - 160/57)  RR: 18 (10-02-22 @ 01:08) (17 - 18)  SpO2: 99% (10-02-22 @ 01:08) (99% - 100%)    09-30-22 @ 07:01  -  10-01-22 @ 07:00  --------------------------------------------------------  IN: 840 mL / OUT: 2225 mL / NET: -1385 mL    10-01-22 @ 07:01  -  10-02-22 @ 01:57  --------------------------------------------------------  IN: 200 mL / OUT: 1525 mL / NET: -1325 mL        General: Appears well, NAD  Neuro: AAOx3  CHEST: Clear to auscultation bilaterally  CV: Regular rate and rhythm  Abdomen: soft, nontender, nondistended, no rebound or guarding  Extremities: Grossly symmetric    LABS                        9.4    16.90 )-----------( 401      ( 01 Oct 2022 06:35 )             30.5     10-01    138  |  103  |  16  ----------------------------<  121<H>  3.9   |  28  |  0.61    Ca    8.6      01 Oct 2022 06:35  Phos  2.8     10-01  Mg     1.90     10-01    TPro  4.9<L>  /  Alb  2.1<L>  /  TBili  0.3  /  DBili  x   /  AST  15  /  ALT  10  /  AlkPhos  153<H>  10-01    PT/INR - ( 01 Oct 2022 06:35 )   PT: 15.5 sec;   INR: 1.33 ratio         PTT - ( 01 Oct 2022 06:35 )  PTT:29.5 sec      RADIOLOGY & ADDITIONAL STUDIES Surgery Progress Note    SUBJECTIVE: Pt seen and examined at bedside. Patient comfortable and in no-apparent distress. No nausea, vomiting, diarrhea. Pain is controlled. +Gas/+BM. Tolerating diet.    Vital Signs Last 24 Hrs  T(C): 37.4 (02 Oct 2022 09:14), Max: 37.4 (01 Oct 2022 18:20)  T(F): 99.4 (02 Oct 2022 09:14), Max: 99.4 (02 Oct 2022 09:14)  HR: 100 (02 Oct 2022 09:14) (83 - 100)  BP: 142/49 (02 Oct 2022 09:14) (121/46 - 142/49)  BP(mean): --  RR: 20 (02 Oct 2022 09:14) (17 - 20)  SpO2: 98% (02 Oct 2022 09:14) (98% - 100%)    Parameters below as of 02 Oct 2022 09:14  Patient On (Oxygen Delivery Method): room air        Physical Exam:  General Appearance: Appears well, NAD  Respiratory: No labored breathing  CV: Pulse regularly present  Abdomen: Soft, nontender. MARGRET drains demonstrating milky yellow fluid.     LABS:                        9.3    15.55 )-----------( 447      ( 02 Oct 2022 05:15 )             29.9     10-02    139  |  104  |  16  ----------------------------<  181<H>  4.5   |  25  |  0.60    Ca    8.4      02 Oct 2022 05:15  Phos  2.8     10-02  Mg     2.10     10-02    TPro  4.9<L>  /  Alb  2.0<L>  /  TBili  0.3  /  DBili  x   /  AST  17  /  ALT  11  /  AlkPhos  198<H>  10-02    PT/INR - ( 01 Oct 2022 06:35 )   PT: 15.5 sec;   INR: 1.33 ratio         PTT - ( 01 Oct 2022 06:35 )  PTT:29.5 sec      INs and OUTs:    10-01-22 @ 07:01  -  10-02-22 @ 07:00  --------------------------------------------------------  IN: 450 mL / OUT: 1945 mL / NET: -1495 mL    MARGRET Cx negative on prelim read.  Surgery Progress Note    SUBJECTIVE: Pt seen and examined at bedside. Patient comfortable and in no-apparent distress. No nausea, vomiting, diarrhea. Pain is controlled. +Gas/+BM. Tolerating diet.    Vital Signs Last 24 Hrs  T(C): 37.4 (02 Oct 2022 09:14), Max: 37.4 (01 Oct 2022 18:20)  T(F): 99.4 (02 Oct 2022 09:14), Max: 99.4 (02 Oct 2022 09:14)  HR: 100 (02 Oct 2022 09:14) (83 - 100)  BP: 142/49 (02 Oct 2022 09:14) (121/46 - 142/49)  BP(mean): --  RR: 20 (02 Oct 2022 09:14) (17 - 20)  SpO2: 98% (02 Oct 2022 09:14) (98% - 100%)    Parameters below as of 02 Oct 2022 09:14  Patient On (Oxygen Delivery Method): room air        Physical Exam:  General Appearance: Appears well, NAD  Respiratory: No labored breathing  CV: Pulse regularly present  Abdomen: Soft, nontender. L MARGRET drains demonstrating milky yellow fluid, R draining serous.     LABS:                        9.3    15.55 )-----------( 447      ( 02 Oct 2022 05:15 )             29.9     10-02    139  |  104  |  16  ----------------------------<  181<H>  4.5   |  25  |  0.60    Ca    8.4      02 Oct 2022 05:15  Phos  2.8     10-02  Mg     2.10     10-02    TPro  4.9<L>  /  Alb  2.0<L>  /  TBili  0.3  /  DBili  x   /  AST  17  /  ALT  11  /  AlkPhos  198<H>  10-02    PT/INR - ( 01 Oct 2022 06:35 )   PT: 15.5 sec;   INR: 1.33 ratio         PTT - ( 01 Oct 2022 06:35 )  PTT:29.5 sec      INs and OUTs:    10-01-22 @ 07:01  -  10-02-22 @ 07:00  --------------------------------------------------------  IN: 450 mL / OUT: 1945 mL / NET: -1495 mL    MARGRET Cx negative on prelim read.

## 2022-10-02 NOTE — PROGRESS NOTE ADULT - ASSESSMENT
72F w/ diagnosis of pancreatic cancer on endoscopy, s/p exploratory laparotomy with Whipple procedure, and placement of drains R posterior and L anterior to the anastomoses on 9/23. Drain output high. Successfully transferred to the floor 9/30.    Plan:   - Reg diet, +/+.  - IV abx: Cipro/Flagyl  - octreotide  - f/u drain amylase, triglycerides, cholesterol, and cx  - PRN analgesia  - Monitor I&Os  - Monitor MARGRET 1 and 2 drains output  - MARGRET cultures drawn 10/1, prelim read negative.   - Monitor bowel function  - f/u AM labs  - DVT ppx      A Team Surgery  pager: [phone]

## 2022-10-02 NOTE — PROGRESS NOTE ADULT - ASSESSMENT
S/P Whipple's procedure  Chylous discharge from right sided drain  New onset of abdominal pain - r/o gas pain - unlikely gastric outlet obstruction.

## 2022-10-03 LAB
ALBUMIN SERPL ELPH-MCNC: 2.2 G/DL — LOW (ref 3.3–5)
ALP SERPL-CCNC: 204 U/L — HIGH (ref 40–120)
ALT FLD-CCNC: 14 U/L — SIGNIFICANT CHANGE UP (ref 4–33)
ANION GAP SERPL CALC-SCNC: 9 MMOL/L — SIGNIFICANT CHANGE UP (ref 7–14)
AST SERPL-CCNC: 20 U/L — SIGNIFICANT CHANGE UP (ref 4–32)
BILIRUB SERPL-MCNC: 0.4 MG/DL — SIGNIFICANT CHANGE UP (ref 0.2–1.2)
BUN SERPL-MCNC: 15 MG/DL — SIGNIFICANT CHANGE UP (ref 7–23)
CALCIUM SERPL-MCNC: 8.3 MG/DL — LOW (ref 8.4–10.5)
CHLORIDE SERPL-SCNC: 101 MMOL/L — SIGNIFICANT CHANGE UP (ref 98–107)
CO2 SERPL-SCNC: 26 MMOL/L — SIGNIFICANT CHANGE UP (ref 22–31)
CREAT SERPL-MCNC: 0.63 MG/DL — SIGNIFICANT CHANGE UP (ref 0.5–1.3)
EGFR: 94 ML/MIN/1.73M2 — SIGNIFICANT CHANGE UP
GLUCOSE BLDC GLUCOMTR-MCNC: 147 MG/DL — HIGH (ref 70–99)
GLUCOSE BLDC GLUCOMTR-MCNC: 147 MG/DL — HIGH (ref 70–99)
GLUCOSE BLDC GLUCOMTR-MCNC: 182 MG/DL — HIGH (ref 70–99)
GLUCOSE SERPL-MCNC: 160 MG/DL — HIGH (ref 70–99)
HCT VFR BLD CALC: 30.9 % — LOW (ref 34.5–45)
HGB BLD-MCNC: 9.7 G/DL — LOW (ref 11.5–15.5)
MAGNESIUM SERPL-MCNC: 2 MG/DL — SIGNIFICANT CHANGE UP (ref 1.6–2.6)
MCHC RBC-ENTMCNC: 29.7 PG — SIGNIFICANT CHANGE UP (ref 27–34)
MCHC RBC-ENTMCNC: 31.4 GM/DL — LOW (ref 32–36)
MCV RBC AUTO: 94.5 FL — SIGNIFICANT CHANGE UP (ref 80–100)
NRBC # BLD: 0 /100 WBCS — SIGNIFICANT CHANGE UP (ref 0–0)
NRBC # FLD: 0 K/UL — SIGNIFICANT CHANGE UP (ref 0–0)
PHOSPHATE SERPL-MCNC: 2.5 MG/DL — SIGNIFICANT CHANGE UP (ref 2.5–4.5)
PLATELET # BLD AUTO: 546 K/UL — HIGH (ref 150–400)
POTASSIUM SERPL-MCNC: 4.5 MMOL/L — SIGNIFICANT CHANGE UP (ref 3.5–5.3)
POTASSIUM SERPL-SCNC: 4.5 MMOL/L — SIGNIFICANT CHANGE UP (ref 3.5–5.3)
PROT SERPL-MCNC: 5 G/DL — LOW (ref 6–8.3)
RBC # BLD: 3.27 M/UL — LOW (ref 3.8–5.2)
RBC # FLD: 13.1 % — SIGNIFICANT CHANGE UP (ref 10.3–14.5)
SODIUM SERPL-SCNC: 136 MMOL/L — SIGNIFICANT CHANGE UP (ref 135–145)
WBC # BLD: 12.01 K/UL — HIGH (ref 3.8–10.5)
WBC # FLD AUTO: 12.01 K/UL — HIGH (ref 3.8–10.5)

## 2022-10-03 RX ORDER — SODIUM CHLORIDE 9 MG/ML
1000 INJECTION, SOLUTION INTRAVENOUS
Refills: 0 | Status: DISCONTINUED | OUTPATIENT
Start: 2022-10-03 | End: 2022-10-03

## 2022-10-03 RX ORDER — PANTOPRAZOLE SODIUM 20 MG/1
40 TABLET, DELAYED RELEASE ORAL EVERY 24 HOURS
Refills: 0 | Status: DISCONTINUED | OUTPATIENT
Start: 2022-10-03 | End: 2022-10-20

## 2022-10-03 RX ORDER — ACETAMINOPHEN 500 MG
975 TABLET ORAL EVERY 6 HOURS
Refills: 0 | Status: DISCONTINUED | OUTPATIENT
Start: 2022-10-03 | End: 2022-10-20

## 2022-10-03 RX ADMIN — Medication 975 MILLIGRAM(S): at 23:38

## 2022-10-03 RX ADMIN — OXYCODONE HYDROCHLORIDE 10 MILLIGRAM(S): 5 TABLET ORAL at 08:31

## 2022-10-03 RX ADMIN — Medication 50 MILLIGRAM(S): at 08:32

## 2022-10-03 RX ADMIN — INSULIN GLARGINE 9 UNIT(S): 100 INJECTION, SOLUTION SUBCUTANEOUS at 21:39

## 2022-10-03 RX ADMIN — OXYCODONE HYDROCHLORIDE 10 MILLIGRAM(S): 5 TABLET ORAL at 09:25

## 2022-10-03 RX ADMIN — Medication 1 DROP(S): at 16:48

## 2022-10-03 RX ADMIN — SODIUM CHLORIDE 100 MILLILITER(S): 9 INJECTION, SOLUTION INTRAVENOUS at 05:44

## 2022-10-03 RX ADMIN — RISPERIDONE 2 MILLIGRAM(S): 4 TABLET ORAL at 21:21

## 2022-10-03 RX ADMIN — OCTREOTIDE ACETATE 100 MICROGRAM(S): 200 INJECTION, SOLUTION INTRAVENOUS; SUBCUTANEOUS at 16:48

## 2022-10-03 RX ADMIN — PANTOPRAZOLE SODIUM 40 MILLIGRAM(S): 20 TABLET, DELAYED RELEASE ORAL at 08:37

## 2022-10-03 RX ADMIN — Medication 975 MILLIGRAM(S): at 23:08

## 2022-10-03 RX ADMIN — ENOXAPARIN SODIUM 40 MILLIGRAM(S): 100 INJECTION SUBCUTANEOUS at 13:05

## 2022-10-03 RX ADMIN — Medication 975 MILLIGRAM(S): at 16:48

## 2022-10-03 RX ADMIN — OXYCODONE HYDROCHLORIDE 5 MILLIGRAM(S): 5 TABLET ORAL at 22:09

## 2022-10-03 RX ADMIN — OXYCODONE HYDROCHLORIDE 5 MILLIGRAM(S): 5 TABLET ORAL at 21:39

## 2022-10-03 RX ADMIN — Medication 1 DROP(S): at 05:43

## 2022-10-03 RX ADMIN — Medication 2: at 08:32

## 2022-10-03 NOTE — PROGRESS NOTE ADULT - SUBJECTIVE AND OBJECTIVE BOX
TEAM Surgery Progress Note  Patient is a 72y old  Female who presents with a chief complaint of Pancreatic carcinoma (02 Oct 2022 17:31)      INTERVAL EVENTS: Patient is POD# ***No acute events overnight.  SUBJECTIVE: Patient seen and examined at bedside with surgical team, patient without complaints. Denies fever, chills, CP, SOB nausea, vomiting, abdominal pain.    REVIEW OF SYSTEMS:  Constitutional: No fevers or chills. No malaise or weakness.  EENT: No vision changes. No ear pain. No nasal congestion or rhinitis. No throat pain or dysphagia.  Respiratory: No cough, wheezing, or SOB. No hemoptysis.  Cardiovascular: No chest pain or palpitations.  Gastrointestinal: No abdominal pain. No nausea, vomiting, diarrhea or constipation. No hematochezia. No melena.  Genitourinary: No dysuria, hematuria, or frequency.  Neurologic: No numbness or tingling. No weakness.  Skin: No rashes or pruritus.     OBJECTIVE:    Vital Signs Last 24 Hrs  T(C): 36.4 (03 Oct 2022 00:46), Max: 37.4 (02 Oct 2022 09:14)  T(F): 97.6 (03 Oct 2022 00:46), Max: 99.4 (02 Oct 2022 09:14)  HR: 93 (03 Oct 2022 00:46) (85 - 100)  BP: 127/48 (03 Oct 2022 00:46) (127/48 - 165/53)  BP(mean): --  RR: 19 (03 Oct 2022 00:46) (17 - 20)  SpO2: 94% (03 Oct 2022 00:46) (94% - 99%)    Parameters below as of 03 Oct 2022 00:46  Patient On (Oxygen Delivery Method): room air    I&O's Detail    01 Oct 2022 07:01  -  02 Oct 2022 07:00  --------------------------------------------------------  IN:    Oral Fluid: 450 mL  Total IN: 450 mL    OUT:    Drain (mL): 105 mL    Drain (mL): 340 mL    Voided (mL): 1500 mL  Total OUT: 1945 mL    Total NET: -1495 mL      02 Oct 2022 07:01  -  03 Oct 2022 01:35  --------------------------------------------------------  IN:  Total IN: 0 mL    OUT:    Drain (mL): 110 mL    Drain (mL): 20 mL    Voided (mL): 800 mL  Total OUT: 930 mL    Total NET: -930 mL      MEDICATIONS  (STANDING):  artificial  tears Solution 1 Drop(s) Both EYES two times a day  chlorhexidine 4% Liquid 1 Application(s) Topical <User Schedule>  enoxaparin Injectable 40 milliGRAM(s) SubCutaneous every 24 hours  insulin glargine Injectable (LANTUS) 9 Unit(s) SubCutaneous at bedtime  insulin lispro (ADMELOG) corrective regimen sliding scale   SubCutaneous Before meals and at bedtime  metoprolol tartrate 50 milliGRAM(s) Oral daily  octreotide  Injectable 100 MICROGram(s) SubCutaneous daily  risperiDONE   Tablet 2 milliGRAM(s) Oral at bedtime    MEDICATIONS  (PRN):  oxyCODONE    IR 5 milliGRAM(s) Oral every 4 hours PRN Moderate Pain (4 - 6)  oxyCODONE    IR 10 milliGRAM(s) Oral every 4 hours PRN Severe Pain (7 - 10)  sodium chloride 0.9% lock flush 10 milliLiter(s) IV Push every 1 hour PRN Pre/post blood products, medications, blood draw, and to maintain line patency      PHYSICAL EXAM:  Constitutional: A&Ox3, NAD  Respiratory: Unlabored breathing  Abdomen: Soft, nondistended, NTTP. No rebound or guarding.  Extremities: WWP, VENEGAS spontaneously    LABS:                        9.3    15.55 )-----------( 447      ( 02 Oct 2022 05:15 )             29.9     10-02    139  |  104  |  16  ----------------------------<  181<H>  4.5   |  25  |  0.60    Ca    8.4      02 Oct 2022 05:15  Phos  2.8     10-02  Mg     2.10     10-02    TPro  4.9<L>  /  Alb  2.0<L>  /  TBili  0.3  /  DBili  x   /  AST  17  /  ALT  11  /  AlkPhos  198<H>  10-02    PT/INR - ( 01 Oct 2022 06:35 )   PT: 15.5 sec;   INR: 1.33 ratio         PTT - ( 01 Oct 2022 06:35 )  PTT:29.5 sec  LIVER FUNCTIONS - ( 02 Oct 2022 05:15 )  Alb: 2.0 g/dL / Pro: 4.9 g/dL / ALK PHOS: 198 U/L / ALT: 11 U/L / AST: 17 U/L / GGT: x                 IMAGING:     A TEAM Surgery Progress Note  Patient is a 72y old  Female who presents with a chief complaint of Pancreatic carcinoma (02 Oct 2022 17:31)      INTERVAL EVENTS: Patient is No acute events overnight. No episodes of emesis. VSS.     SUBJECTIVE: Patient seen and examined at bedside with surgical team, patient without complaints. Denies fever, chills, CP, SOB nausea, vomiting, abdominal pain. +/+      OBJECTIVE:    Vital Signs Last 24 Hrs  T(C): 36.4 (03 Oct 2022 00:46), Max: 37.4 (02 Oct 2022 09:14)  T(F): 97.6 (03 Oct 2022 00:46), Max: 99.4 (02 Oct 2022 09:14)  HR: 93 (03 Oct 2022 00:46) (85 - 100)  BP: 127/48 (03 Oct 2022 00:46) (127/48 - 165/53)  BP(mean): --  RR: 19 (03 Oct 2022 00:46) (17 - 20)  SpO2: 94% (03 Oct 2022 00:46) (94% - 99%)    Parameters below as of 03 Oct 2022 00:46  Patient On (Oxygen Delivery Method): room air    I&O's Detail    01 Oct 2022 07:01  -  02 Oct 2022 07:00  --------------------------------------------------------  IN:    Oral Fluid: 450 mL  Total IN: 450 mL    OUT:    Drain (mL): 105 mL    Drain (mL): 340 mL    Voided (mL): 1500 mL  Total OUT: 1945 mL    Total NET: -1495 mL      02 Oct 2022 07:01  -  03 Oct 2022 01:35  --------------------------------------------------------  IN:  Total IN: 0 mL    OUT:    Drain (mL): 110 mL    Drain (mL): 20 mL    Voided (mL): 800 mL  Total OUT: 930 mL    Total NET: -930 mL      MEDICATIONS  (STANDING):  artificial  tears Solution 1 Drop(s) Both EYES two times a day  chlorhexidine 4% Liquid 1 Application(s) Topical <User Schedule>  enoxaparin Injectable 40 milliGRAM(s) SubCutaneous every 24 hours  insulin glargine Injectable (LANTUS) 9 Unit(s) SubCutaneous at bedtime  insulin lispro (ADMELOG) corrective regimen sliding scale   SubCutaneous Before meals and at bedtime  metoprolol tartrate 50 milliGRAM(s) Oral daily  octreotide  Injectable 100 MICROGram(s) SubCutaneous daily  risperiDONE   Tablet 2 milliGRAM(s) Oral at bedtime    MEDICATIONS  (PRN):  oxyCODONE    IR 5 milliGRAM(s) Oral every 4 hours PRN Moderate Pain (4 - 6)  oxyCODONE    IR 10 milliGRAM(s) Oral every 4 hours PRN Severe Pain (7 - 10)  sodium chloride 0.9% lock flush 10 milliLiter(s) IV Push every 1 hour PRN Pre/post blood products, medications, blood draw, and to maintain line patency      PHYSICAL EXAM:  General Appearance: Appears well, NAD  Respiratory: No labored breathing  CV: Pulse regularly present  Abdomen: Soft, nontender. MARGRET drains demonstrating milky yellow fluid.     LABS:                        9.3    15.55 )-----------( 447      ( 02 Oct 2022 05:15 )             29.9     10-02    139  |  104  |  16  ----------------------------<  181<H>  4.5   |  25  |  0.60    Ca    8.4      02 Oct 2022 05:15  Phos  2.8     10-02  Mg     2.10     10-02    TPro  4.9<L>  /  Alb  2.0<L>  /  TBili  0.3  /  DBili  x   /  AST  17  /  ALT  11  /  AlkPhos  198<H>  10-02    PT/INR - ( 01 Oct 2022 06:35 )   PT: 15.5 sec;   INR: 1.33 ratio         PTT - ( 01 Oct 2022 06:35 )  PTT:29.5 sec  LIVER FUNCTIONS - ( 02 Oct 2022 05:15 )  Alb: 2.0 g/dL / Pro: 4.9 g/dL / ALK PHOS: 198 U/L / ALT: 11 U/L / AST: 17 U/L / GGT: x                 IMAGING:     A TEAM Surgery Progress Note  Patient is a 72y old  Female who presents with a chief complaint of Pancreatic carcinoma (02 Oct 2022 17:31)      INTERVAL EVENTS: Patient is No acute events overnight. No episodes of emesis. VSS.     SUBJECTIVE: Patient seen and examined at bedside with surgical team, patient without complaints. Denies fever, chills, CP, SOB nausea, vomiting, abdominal pain. +/+      OBJECTIVE:    Vital Signs Last 24 Hrs  T(C): 36.4 (03 Oct 2022 00:46), Max: 37.4 (02 Oct 2022 09:14)  T(F): 97.6 (03 Oct 2022 00:46), Max: 99.4 (02 Oct 2022 09:14)  HR: 93 (03 Oct 2022 00:46) (85 - 100)  BP: 127/48 (03 Oct 2022 00:46) (127/48 - 165/53)  BP(mean): --  RR: 19 (03 Oct 2022 00:46) (17 - 20)  SpO2: 94% (03 Oct 2022 00:46) (94% - 99%)    Parameters below as of 03 Oct 2022 00:46  Patient On (Oxygen Delivery Method): room air    I&O's Detail    01 Oct 2022 07:01  -  02 Oct 2022 07:00  --------------------------------------------------------  IN:    Oral Fluid: 450 mL  Total IN: 450 mL    OUT:    Drain (mL): 105 mL    Drain (mL): 340 mL    Voided (mL): 1500 mL  Total OUT: 1945 mL    Total NET: -1495 mL      02 Oct 2022 07:01  -  03 Oct 2022 01:35  --------------------------------------------------------  IN:  Total IN: 0 mL    OUT:    Drain (mL): 110 mL    Drain (mL): 20 mL    Voided (mL): 800 mL  Total OUT: 930 mL    Total NET: -930 mL      MEDICATIONS  (STANDING):  artificial  tears Solution 1 Drop(s) Both EYES two times a day  chlorhexidine 4% Liquid 1 Application(s) Topical <User Schedule>  enoxaparin Injectable 40 milliGRAM(s) SubCutaneous every 24 hours  insulin glargine Injectable (LANTUS) 9 Unit(s) SubCutaneous at bedtime  insulin lispro (ADMELOG) corrective regimen sliding scale   SubCutaneous Before meals and at bedtime  metoprolol tartrate 50 milliGRAM(s) Oral daily  octreotide  Injectable 100 MICROGram(s) SubCutaneous daily  risperiDONE   Tablet 2 milliGRAM(s) Oral at bedtime    MEDICATIONS  (PRN):  oxyCODONE    IR 5 milliGRAM(s) Oral every 4 hours PRN Moderate Pain (4 - 6)  oxyCODONE    IR 10 milliGRAM(s) Oral every 4 hours PRN Severe Pain (7 - 10)  sodium chloride 0.9% lock flush 10 milliLiter(s) IV Push every 1 hour PRN Pre/post blood products, medications, blood draw, and to maintain line patency      PHYSICAL EXAM:  General Appearance: Appears well, NAD  Respiratory: No labored breathing  CV: Pulse regularly present  Abdomen: Soft, nontender. R MARGRET drain with serous output. L MARGRET drain with milky yellow output.     LABS:                        9.3    15.55 )-----------( 447      ( 02 Oct 2022 05:15 )             29.9     10-02    139  |  104  |  16  ----------------------------<  181<H>  4.5   |  25  |  0.60    Ca    8.4      02 Oct 2022 05:15  Phos  2.8     10-02  Mg     2.10     10-02    TPro  4.9<L>  /  Alb  2.0<L>  /  TBili  0.3  /  DBili  x   /  AST  17  /  ALT  11  /  AlkPhos  198<H>  10-02    PT/INR - ( 01 Oct 2022 06:35 )   PT: 15.5 sec;   INR: 1.33 ratio         PTT - ( 01 Oct 2022 06:35 )  PTT:29.5 sec  LIVER FUNCTIONS - ( 02 Oct 2022 05:15 )  Alb: 2.0 g/dL / Pro: 4.9 g/dL / ALK PHOS: 198 U/L / ALT: 11 U/L / AST: 17 U/L / GGT: x                 IMAGING:

## 2022-10-03 NOTE — PROGRESS NOTE ADULT - SUBJECTIVE AND OBJECTIVE BOX
Surgery Progress Note     Patient is a 72y old  Female who presents with a chief complaint of Pancreatic carcinoma (02 Oct 2022 17:31)      HPI:  This is a 73 y/o female whose native language is [place] Creole; comprehends and verbalizes minimal English. Refused hospital ; requested sister Mariaa function as . Patient   presents with diagnosis of pancreatic cancer on endoscopy biopsy. Subsequent CT scan and PET scan confirm pathology. Scheduled for whipple procedure pancreatic cancer (20 Sep 2022 07:10)      PAST MEDICAL & SURGICAL HISTORY:  Language barrier  native language [place] Creole; comprehends and verbzlizes minimal English      Type 2 diabetes mellitus      Anxiety and depression      Hypertension      Pancreatic cancer  September 2022      Uterine fibroid  hysterectomyu 1988      H/O jaundice  biliary stent in place          Physical Exam:    Vital Signs Last 24 Hrs  T(C): 37 (03 Oct 2022 08:24), Max: 37.2 (02 Oct 2022 16:55)  T(F): 98.6 (03 Oct 2022 08:24), Max: 98.9 (02 Oct 2022 16:55)  HR: 102 (03 Oct 2022 08:24) (85 - 102)  BP: 135/41 (03 Oct 2022 08:24) (127/48 - 165/53)  BP(mean): --  RR: 18 (03 Oct 2022 08:24) (18 - 20)  SpO2: 98% (03 Oct 2022 08:24) (94% - 99%)    Parameters below as of 03 Oct 2022 08:24  Patient On (Oxygen Delivery Method): room air        General appearance:  Sitting comfortably in bed. No c/o. Tolerating liquids.        Drainage Lt : 10 cc  Rt : 80 cc decreasing.    Labs:                          9.7    12.01 )-----------( 546      ( 03 Oct 2022 07:09 )             30.9     10-03    136  |  101  |  15  ----------------------------<  160<H>  4.5   |  26  |  0.63    Ca    8.3<L>      03 Oct 2022 07:09  Phos  2.5     10-03  Mg     2.00     10-03    TPro  5.0<L>  /  Alb  2.2<L>  /  TBili  0.4  /  DBili  x   /  AST  20  /  ALT  14  /  AlkPhos  204<H>  10-03          Assessment and Plan:  Cont po liquids. Current Rx.  WBC coming down.

## 2022-10-04 LAB
ALBUMIN SERPL ELPH-MCNC: 2.3 G/DL — LOW (ref 3.3–5)
ALP SERPL-CCNC: 205 U/L — HIGH (ref 40–120)
ALT FLD-CCNC: 14 U/L — SIGNIFICANT CHANGE UP (ref 4–33)
AMYLASE FLD-CCNC: 19 U/L — SIGNIFICANT CHANGE UP
AMYLASE FLD-CCNC: 5369 U/L — SIGNIFICANT CHANGE UP
ANION GAP SERPL CALC-SCNC: 12 MMOL/L — SIGNIFICANT CHANGE UP (ref 7–14)
AST SERPL-CCNC: 18 U/L — SIGNIFICANT CHANGE UP (ref 4–32)
BILIRUB SERPL-MCNC: 0.3 MG/DL — SIGNIFICANT CHANGE UP (ref 0.2–1.2)
BUN SERPL-MCNC: 13 MG/DL — SIGNIFICANT CHANGE UP (ref 7–23)
CALCIUM SERPL-MCNC: 8.1 MG/DL — LOW (ref 8.4–10.5)
CHLORIDE SERPL-SCNC: 98 MMOL/L — SIGNIFICANT CHANGE UP (ref 98–107)
CO2 SERPL-SCNC: 25 MMOL/L — SIGNIFICANT CHANGE UP (ref 22–31)
CREAT SERPL-MCNC: 0.62 MG/DL — SIGNIFICANT CHANGE UP (ref 0.5–1.3)
CULTURE RESULTS: SIGNIFICANT CHANGE UP
CULTURE RESULTS: SIGNIFICANT CHANGE UP
EGFR: 95 ML/MIN/1.73M2 — SIGNIFICANT CHANGE UP
GLUCOSE BLDC GLUCOMTR-MCNC: 138 MG/DL — HIGH (ref 70–99)
GLUCOSE BLDC GLUCOMTR-MCNC: 140 MG/DL — HIGH (ref 70–99)
GLUCOSE BLDC GLUCOMTR-MCNC: 179 MG/DL — HIGH (ref 70–99)
GLUCOSE BLDC GLUCOMTR-MCNC: 190 MG/DL — HIGH (ref 70–99)
GLUCOSE SERPL-MCNC: 137 MG/DL — HIGH (ref 70–99)
HCT VFR BLD CALC: 31.1 % — LOW (ref 34.5–45)
HGB BLD-MCNC: 9.7 G/DL — LOW (ref 11.5–15.5)
MAGNESIUM SERPL-MCNC: 1.9 MG/DL — SIGNIFICANT CHANGE UP (ref 1.6–2.6)
MCHC RBC-ENTMCNC: 29.3 PG — SIGNIFICANT CHANGE UP (ref 27–34)
MCHC RBC-ENTMCNC: 31.2 GM/DL — LOW (ref 32–36)
MCV RBC AUTO: 94 FL — SIGNIFICANT CHANGE UP (ref 80–100)
NRBC # BLD: 0 /100 WBCS — SIGNIFICANT CHANGE UP (ref 0–0)
NRBC # FLD: 0 K/UL — SIGNIFICANT CHANGE UP (ref 0–0)
PHOSPHATE SERPL-MCNC: 2 MG/DL — LOW (ref 2.5–4.5)
PLATELET # BLD AUTO: 572 K/UL — HIGH (ref 150–400)
POTASSIUM SERPL-MCNC: 4.4 MMOL/L — SIGNIFICANT CHANGE UP (ref 3.5–5.3)
POTASSIUM SERPL-SCNC: 4.4 MMOL/L — SIGNIFICANT CHANGE UP (ref 3.5–5.3)
PROT SERPL-MCNC: 5.2 G/DL — LOW (ref 6–8.3)
RBC # BLD: 3.31 M/UL — LOW (ref 3.8–5.2)
RBC # FLD: 13.1 % — SIGNIFICANT CHANGE UP (ref 10.3–14.5)
SODIUM SERPL-SCNC: 135 MMOL/L — SIGNIFICANT CHANGE UP (ref 135–145)
SPECIMEN SOURCE: SIGNIFICANT CHANGE UP
SPECIMEN SOURCE: SIGNIFICANT CHANGE UP
WBC # BLD: 15.66 K/UL — HIGH (ref 3.8–10.5)
WBC # FLD AUTO: 15.66 K/UL — HIGH (ref 3.8–10.5)

## 2022-10-04 RX ADMIN — Medication 63.75 MILLIMOLE(S): at 09:01

## 2022-10-04 RX ADMIN — ENOXAPARIN SODIUM 40 MILLIGRAM(S): 100 INJECTION SUBCUTANEOUS at 13:14

## 2022-10-04 RX ADMIN — PANTOPRAZOLE SODIUM 40 MILLIGRAM(S): 20 TABLET, DELAYED RELEASE ORAL at 09:00

## 2022-10-04 RX ADMIN — Medication 1 DROP(S): at 17:32

## 2022-10-04 RX ADMIN — RISPERIDONE 2 MILLIGRAM(S): 4 TABLET ORAL at 21:44

## 2022-10-04 RX ADMIN — Medication 975 MILLIGRAM(S): at 13:14

## 2022-10-04 RX ADMIN — OXYCODONE HYDROCHLORIDE 10 MILLIGRAM(S): 5 TABLET ORAL at 21:44

## 2022-10-04 RX ADMIN — Medication 50 MILLIGRAM(S): at 05:06

## 2022-10-04 RX ADMIN — OCTREOTIDE ACETATE 100 MICROGRAM(S): 200 INJECTION, SOLUTION INTRAVENOUS; SUBCUTANEOUS at 17:35

## 2022-10-04 RX ADMIN — Medication 975 MILLIGRAM(S): at 18:44

## 2022-10-04 RX ADMIN — Medication 10 MILLIGRAM(S): at 17:38

## 2022-10-04 RX ADMIN — Medication 975 MILLIGRAM(S): at 23:49

## 2022-10-04 RX ADMIN — INSULIN GLARGINE 9 UNIT(S): 100 INJECTION, SOLUTION SUBCUTANEOUS at 22:04

## 2022-10-04 RX ADMIN — Medication 975 MILLIGRAM(S): at 05:08

## 2022-10-04 RX ADMIN — Medication 2: at 22:04

## 2022-10-04 RX ADMIN — Medication 975 MILLIGRAM(S): at 05:38

## 2022-10-04 RX ADMIN — Medication 975 MILLIGRAM(S): at 18:41

## 2022-10-04 RX ADMIN — Medication 975 MILLIGRAM(S): at 13:41

## 2022-10-04 RX ADMIN — Medication 2: at 13:08

## 2022-10-04 RX ADMIN — Medication 1 DROP(S): at 05:06

## 2022-10-04 NOTE — PROGRESS NOTE ADULT - SUBJECTIVE AND OBJECTIVE BOX
POD # 11      Subjective: I have no pain today. I do not have much of an appetite. I have passed flatus but I have not had a BM in two days and I have a chronic constipation issue. I have been walking around the [place].    Objective:   Vital Signs Last 24 Hrs  T(C): 36.8 (04 Oct 2022 12:45), Max: 37.4 (03 Oct 2022 16:15)  T(F): 98.2 (04 Oct 2022 12:45), Max: 99.3 (03 Oct 2022 16:15)  HR: 86 (04 Oct 2022 12:45) (86 - 98)  BP: 148/55 (04 Oct 2022 12:45) (136/50 - 155/59)  BP(mean): --  RR: 18 (04 Oct 2022 12:45) (17 - 18)  SpO2: 98% (04 Oct 2022 12:45) (96% - 98%)    Parameters below as of 04 Oct 2022 12:45  Patient On (Oxygen Delivery Method): room air        Daily     Daily     Heent: N/C, AT PER no scleral icterus, No JVD  Pul: clear  Cor: RRR  Abdomen: soft, non distended, normal BS. Wound - clean but there may be a collection in the wound, although the skin in normal looking. Both drains were stripped and remain patent.  Extremities: without edema, motor/sensory intact    I&O's Detail    03 Oct 2022 07:01  -  04 Oct 2022 07:00  --------------------------------------------------------  IN:  Total IN: 0 mL    OUT:    Drain (mL): 127.5 mL    Drain (mL): 120 mL    Voided (mL): 1200 mL  Total OUT: 1447.5 mL    Total NET: -1447.5 mL          MEDICATIONS  (STANDING):  acetaminophen     Tablet .. 975 milliGRAM(s) Oral every 6 hours  artificial  tears Solution 1 Drop(s) Both EYES two times a day  enoxaparin Injectable 40 milliGRAM(s) SubCutaneous every 24 hours  insulin glargine Injectable (LANTUS) 9 Unit(s) SubCutaneous at bedtime  insulin lispro (ADMELOG) corrective regimen sliding scale   SubCutaneous Before meals and at bedtime  metoprolol tartrate 50 milliGRAM(s) Oral daily  octreotide  Injectable 100 MICROGram(s) SubCutaneous daily  pantoprazole  Injectable 40 milliGRAM(s) IV Push every 24 hours  risperiDONE   Tablet 2 milliGRAM(s) Oral at bedtime    MEDICATIONS  (PRN):  oxyCODONE    IR 5 milliGRAM(s) Oral every 4 hours PRN Moderate Pain (4 - 6)  oxyCODONE    IR 10 milliGRAM(s) Oral every 4 hours PRN Severe Pain (7 - 10)  sodium chloride 0.9% lock flush 10 milliLiter(s) IV Push every 1 hour PRN Pre/post blood products, medications, blood draw, and to maintain line patency                            9.7    15.66 )-----------( 572      ( 04 Oct 2022 07:28 )             31.1     10-04    135  |  98  |  13  ----------------------------<  137<H>  4.4   |  25  |  0.62    Ca    8.1<L>      04 Oct 2022 07:28  Phos  2.0     10-04  Mg     1.90     10-04    TPro  5.2<L>  /  Alb  2.3<L>  /  TBili  0.3  /  DBili  x   /  AST  18  /  ALT  14  /  AlkPhos  205<H>  10-04        Radiologic Studies:

## 2022-10-04 NOTE — PROGRESS NOTE ADULT - ASSESSMENT
72F w/ diagnosis of pancreatic cancer on endoscopy, s/p exploratory laparotomy with Whipple procedure, and placement of drains R posterior and L anterior to the anastomoses on 9/23. Drain output high. Successfully transferred to the floor 9/30.    Plan:   - Reg diet, +/+.  - IV abx: Cipro/Flagyl  - octreotide  - f/u drain amylase, triglycerides, cholesterol, and cx  - PRN analgesia  - Monitor I&Os  - Monitor MARGRET 1 and 2 drains output  - MARGRET cultures drawn 10/1, prelim read negative.   - Monitor bowel function  - f/u AM labs  - DVT ppx      A Team Surgery  pager: [phone]   Patient is a 72 year old female with a PMHx of DM2, anxiety, depression and HTN who presented to pre-surgical testing with diagnosis of pancreatic cancer.  Patient is now S/P ex-lap and Whipple on 9/23/22.      PLAN:  - Clear liquid diet  - Monitor MARGRET drain output  - Monitor MARGRET drain amylase daily  - Out of bed  - Pain control  - VTE prophylaxis with Lovenox subcutaneous      #[id number]  A Team Surgery

## 2022-10-04 NOTE — PROGRESS NOTE ADULT - SUBJECTIVE AND OBJECTIVE BOX
TEAM Surgery Progress Note  Patient is a 72y old  Female who presents with a chief complaint of Pancreatic mass (03 Oct 2022 12:17)      INTERVAL EVENTS: Patient is POD# ***No acute events overnight.  SUBJECTIVE: Patient seen and examined at bedside with surgical team, patient without complaints. Denies fever, chills, CP, SOB nausea, vomiting, abdominal pain.    REVIEW OF SYSTEMS:  Constitutional: No fevers or chills. No malaise or weakness.  EENT: No vision changes. No ear pain. No nasal congestion or rhinitis. No throat pain or dysphagia.  Respiratory: No cough, wheezing, or SOB. No hemoptysis.  Cardiovascular: No chest pain or palpitations.  Gastrointestinal: No abdominal pain. No nausea, vomiting, diarrhea or constipation. No hematochezia. No melena.  Genitourinary: No dysuria, hematuria, or frequency.  Neurologic: No numbness or tingling. No weakness.  Skin: No rashes or pruritus.     OBJECTIVE:    Vital Signs Last 24 Hrs  T(C): 36.9 (03 Oct 2022 21:30), Max: 37.4 (03 Oct 2022 16:15)  T(F): 98.5 (03 Oct 2022 21:30), Max: 99.3 (03 Oct 2022 16:15)  HR: 86 (03 Oct 2022 21:30) (86 - 102)  BP: 138/52 (03 Oct 2022 21:30) (127/48 - 155/53)  BP(mean): --  RR: 18 (03 Oct 2022 21:30) (18 - 19)  SpO2: 97% (03 Oct 2022 21:30) (94% - 98%)    Parameters below as of 03 Oct 2022 21:30  Patient On (Oxygen Delivery Method): room air    I&O's Detail    02 Oct 2022 07:01  -  03 Oct 2022 07:00  --------------------------------------------------------  IN:    dextrose 5% + lactated ringers: 200 mL  Total IN: 200 mL    OUT:    Drain (mL): 40 mL    Drain (mL): 340 mL    Voided (mL): 800 mL  Total OUT: 1180 mL    Total NET: -980 mL      03 Oct 2022 07:01  -  04 Oct 2022 00:17  --------------------------------------------------------  IN:  Total IN: 0 mL    OUT:    Drain (mL): 120 mL    Drain (mL): 110 mL    Voided (mL): 900 mL  Total OUT: 1130 mL    Total NET: -1130 mL      MEDICATIONS  (STANDING):  acetaminophen     Tablet .. 975 milliGRAM(s) Oral every 6 hours  artificial  tears Solution 1 Drop(s) Both EYES two times a day  enoxaparin Injectable 40 milliGRAM(s) SubCutaneous every 24 hours  insulin glargine Injectable (LANTUS) 9 Unit(s) SubCutaneous at bedtime  insulin lispro (ADMELOG) corrective regimen sliding scale   SubCutaneous Before meals and at bedtime  metoprolol tartrate 50 milliGRAM(s) Oral daily  octreotide  Injectable 100 MICROGram(s) SubCutaneous daily  pantoprazole  Injectable 40 milliGRAM(s) IV Push every 24 hours  risperiDONE   Tablet 2 milliGRAM(s) Oral at bedtime    MEDICATIONS  (PRN):  oxyCODONE    IR 5 milliGRAM(s) Oral every 4 hours PRN Moderate Pain (4 - 6)  oxyCODONE    IR 10 milliGRAM(s) Oral every 4 hours PRN Severe Pain (7 - 10)  sodium chloride 0.9% lock flush 10 milliLiter(s) IV Push every 1 hour PRN Pre/post blood products, medications, blood draw, and to maintain line patency      PHYSICAL EXAM:  Constitutional: A&Ox3, NAD  Respiratory: Unlabored breathing  Abdomen: Soft, nondistended, NTTP. No rebound or guarding.  Extremities: WWP, VENEGAS spontaneously    LABS:                        9.7    12.01 )-----------( 546      ( 03 Oct 2022 07:09 )             30.9     10-03    136  |  101  |  15  ----------------------------<  160<H>  4.5   |  26  |  0.63    Ca    8.3<L>      03 Oct 2022 07:09  Phos  2.5     10-03  Mg     2.00     10-03    TPro  5.0<L>  /  Alb  2.2<L>  /  TBili  0.4  /  DBili  x   /  AST  20  /  ALT  14  /  AlkPhos  204<H>  10-03      LIVER FUNCTIONS - ( 03 Oct 2022 07:09 )  Alb: 2.2 g/dL / Pro: 5.0 g/dL / ALK PHOS: 204 U/L / ALT: 14 U/L / AST: 20 U/L / GGT: x                 IMAGING:     Surgery Daily Progress Note  =====================================================  Interval / Overnight Events: No acute events overnight.      HPI:  Patient is a 72 year old female with a PMHx of DM2, anxiety, depression and HTN who presented to pre-surgical testing with diagnosis of pancreatic cancer. (20 Sep 2022 07:10)      PAST MEDICAL & SURGICAL HISTORY:  Language barrier  native language [place] Creole; comprehends and verbzlizes minimal English  Type 2 diabetes mellitus  Anxiety and depression  Hypertension  Pancreatic cancer  September 2022  Uterine fibroid  hysterectomy 1988  H/O jaundice  biliary stent in place      ALLERGIES:  penicillin (Other)    --------------------------------------------------------------------------------------    MEDICATIONS:    Neurologic Medications  acetaminophen     Tablet .. 975 milliGRAM(s) Oral every 6 hours  oxyCODONE    IR 5 milliGRAM(s) Oral every 4 hours PRN Moderate Pain (4 - 6)  oxyCODONE    IR 10 milliGRAM(s) Oral every 4 hours PRN Severe Pain (7 - 10)  risperiDONE   Tablet 2 milliGRAM(s) Oral at bedtime    Cardiovascular Medications  metoprolol tartrate 50 milliGRAM(s) Oral daily    Gastrointestinal Medications  pantoprazole  Injectable 40 milliGRAM(s) IV Push every 24 hours  sodium chloride 0.9% lock flush 10 milliLiter(s) IV Push every 1 hour PRN Pre/post blood products, medications, blood draw, and to maintain line patency    Hematologic/Oncologic Medications  enoxaparin Injectable 40 milliGRAM(s) SubCutaneous every 24 hours    Endocrine/Metabolic Medications  insulin glargine Injectable (LANTUS) 9 Unit(s) SubCutaneous at bedtime  insulin lispro (ADMELOG) corrective regimen sliding scale   SubCutaneous Before meals and at bedtime  octreotide  Injectable 100 MICROGram(s) SubCutaneous daily    Topical/Other Medications  artificial  tears Solution 1 Drop(s) Both EYES two times a day    --------------------------------------------------------------------------------------    VITAL SIGNS:  T(C): 37 (04 Oct 2022 04:45), Max: 37.4 (03 Oct 2022 16:15)  T(F): 98.6 (04 Oct 2022 04:45), Max: 99.3 (03 Oct 2022 16:15)  HR: 94 (04 Oct 2022 04:45) (86 - 102)  BP: 152/54 (04 Oct 2022 04:45) (131/38 - 152/54)  RR: 18 (04 Oct 2022 04:45) (18 - 18)  SpO2: 96% (04 Oct 2022 04:45) (96% - 98%)    --------------------------------------------------------------------------------------    INS AND OUTS:    03 Oct 2022 07:01  -  04 Oct 2022 07:00  --------------------------------------------------------  IN:  Total IN: 0 mL    OUT:    Drain (mL): 127.5 mL    Drain (mL): 120 mL    Voided (mL): 1200 mL  Total OUT: 1447.5 mL    Total NET: -1447.5 mL    --------------------------------------------------------------------------------------    EXAM    NEUROLOGY  Exam: Normal, in no acute distress.    HEENT  Exam: Normocephalic, atraumatic.    RESPIRATORY  Exam: Normal expansion / effort.    CARDIOVASCULAR  Exam: S1, S2.  Regular rate and rhythm.    GI/NUTRITION  Exam: Abdomen soft, Non-tender, Non-distended.  Midline incision with staples clean, dry and intact.  MARGRET drains x2 with serosanguinous output.  Current Diet: Clear liquid diet    MUSCULOSKELETAL  Exam: All extremities moving spontaneously without limitations.      HEMATOLOGIC  [x] VTE Prophylaxis: enoxaparin Injectable 40 milliGRAM(s) SubCutaneous every 24 hours    --------------------------------------------------------------------------------------

## 2022-10-05 LAB
ALBUMIN SERPL ELPH-MCNC: 2.5 G/DL — LOW (ref 3.3–5)
ALP SERPL-CCNC: 198 U/L — HIGH (ref 40–120)
ALT FLD-CCNC: 11 U/L — SIGNIFICANT CHANGE UP (ref 4–33)
AMYLASE FLD-CCNC: 2790 U/L — SIGNIFICANT CHANGE UP
AMYLASE FLD-CCNC: SIGNIFICANT CHANGE UP U/L
AMYLASE FLD-CCNC: SIGNIFICANT CHANGE UP U/L
ANION GAP SERPL CALC-SCNC: 14 MMOL/L — SIGNIFICANT CHANGE UP (ref 7–14)
AST SERPL-CCNC: 14 U/L — SIGNIFICANT CHANGE UP (ref 4–32)
BILIRUB SERPL-MCNC: 0.3 MG/DL — SIGNIFICANT CHANGE UP (ref 0.2–1.2)
BUN SERPL-MCNC: 10 MG/DL — SIGNIFICANT CHANGE UP (ref 7–23)
CALCIUM SERPL-MCNC: 8.3 MG/DL — LOW (ref 8.4–10.5)
CHLORIDE SERPL-SCNC: 97 MMOL/L — LOW (ref 98–107)
CO2 SERPL-SCNC: 24 MMOL/L — SIGNIFICANT CHANGE UP (ref 22–31)
CREAT SERPL-MCNC: 0.61 MG/DL — SIGNIFICANT CHANGE UP (ref 0.5–1.3)
EGFR: 95 ML/MIN/1.73M2 — SIGNIFICANT CHANGE UP
GLUCOSE BLDC GLUCOMTR-MCNC: 111 MG/DL — HIGH (ref 70–99)
GLUCOSE BLDC GLUCOMTR-MCNC: 118 MG/DL — HIGH (ref 70–99)
GLUCOSE BLDC GLUCOMTR-MCNC: 150 MG/DL — HIGH (ref 70–99)
GLUCOSE BLDC GLUCOMTR-MCNC: 161 MG/DL — HIGH (ref 70–99)
GLUCOSE BLDC GLUCOMTR-MCNC: 181 MG/DL — HIGH (ref 70–99)
GLUCOSE SERPL-MCNC: 138 MG/DL — HIGH (ref 70–99)
HCT VFR BLD CALC: 28.6 % — LOW (ref 34.5–45)
HGB BLD-MCNC: 9.1 G/DL — LOW (ref 11.5–15.5)
MAGNESIUM SERPL-MCNC: 1.9 MG/DL — SIGNIFICANT CHANGE UP (ref 1.6–2.6)
MCHC RBC-ENTMCNC: 29.1 PG — SIGNIFICANT CHANGE UP (ref 27–34)
MCHC RBC-ENTMCNC: 31.8 GM/DL — LOW (ref 32–36)
MCV RBC AUTO: 91.4 FL — SIGNIFICANT CHANGE UP (ref 80–100)
NRBC # BLD: 0 /100 WBCS — SIGNIFICANT CHANGE UP (ref 0–0)
NRBC # FLD: 0 K/UL — SIGNIFICANT CHANGE UP (ref 0–0)
PHOSPHATE SERPL-MCNC: 2 MG/DL — LOW (ref 2.5–4.5)
PLATELET # BLD AUTO: 613 K/UL — HIGH (ref 150–400)
POTASSIUM SERPL-MCNC: 4 MMOL/L — SIGNIFICANT CHANGE UP (ref 3.5–5.3)
POTASSIUM SERPL-SCNC: 4 MMOL/L — SIGNIFICANT CHANGE UP (ref 3.5–5.3)
PROT SERPL-MCNC: 5 G/DL — LOW (ref 6–8.3)
RBC # BLD: 3.13 M/UL — LOW (ref 3.8–5.2)
RBC # FLD: 13.3 % — SIGNIFICANT CHANGE UP (ref 10.3–14.5)
SARS-COV-2 RNA SPEC QL NAA+PROBE: SIGNIFICANT CHANGE UP
SODIUM SERPL-SCNC: 135 MMOL/L — SIGNIFICANT CHANGE UP (ref 135–145)
WBC # BLD: 13.07 K/UL — HIGH (ref 3.8–10.5)
WBC # FLD AUTO: 13.07 K/UL — HIGH (ref 3.8–10.5)

## 2022-10-05 PROCEDURE — 74177 CT ABD & PELVIS W/CONTRAST: CPT | Mod: 26

## 2022-10-05 RX ORDER — MAGNESIUM SULFATE 500 MG/ML
1 VIAL (ML) INJECTION ONCE
Refills: 0 | Status: COMPLETED | OUTPATIENT
Start: 2022-10-05 | End: 2022-10-05

## 2022-10-05 RX ORDER — SODIUM,POTASSIUM PHOSPHATES 278-250MG
1 POWDER IN PACKET (EA) ORAL
Refills: 0 | Status: COMPLETED | OUTPATIENT
Start: 2022-10-05 | End: 2022-10-05

## 2022-10-05 RX ADMIN — OXYCODONE HYDROCHLORIDE 10 MILLIGRAM(S): 5 TABLET ORAL at 09:52

## 2022-10-05 RX ADMIN — Medication 50 MILLIGRAM(S): at 05:38

## 2022-10-05 RX ADMIN — INSULIN GLARGINE 9 UNIT(S): 100 INJECTION, SOLUTION SUBCUTANEOUS at 23:33

## 2022-10-05 RX ADMIN — Medication 975 MILLIGRAM(S): at 17:54

## 2022-10-05 RX ADMIN — Medication 100 GRAM(S): at 09:30

## 2022-10-05 RX ADMIN — OXYCODONE HYDROCHLORIDE 10 MILLIGRAM(S): 5 TABLET ORAL at 15:57

## 2022-10-05 RX ADMIN — Medication 975 MILLIGRAM(S): at 23:35

## 2022-10-05 RX ADMIN — Medication 975 MILLIGRAM(S): at 18:24

## 2022-10-05 RX ADMIN — ENOXAPARIN SODIUM 40 MILLIGRAM(S): 100 INJECTION SUBCUTANEOUS at 17:53

## 2022-10-05 RX ADMIN — Medication 2: at 18:00

## 2022-10-05 RX ADMIN — Medication 2: at 09:23

## 2022-10-05 RX ADMIN — Medication 1 PACKET(S): at 17:54

## 2022-10-05 RX ADMIN — Medication 1 DROP(S): at 06:12

## 2022-10-05 RX ADMIN — Medication 1 PACKET(S): at 09:27

## 2022-10-05 RX ADMIN — PANTOPRAZOLE SODIUM 40 MILLIGRAM(S): 20 TABLET, DELAYED RELEASE ORAL at 09:26

## 2022-10-05 RX ADMIN — OXYCODONE HYDROCHLORIDE 10 MILLIGRAM(S): 5 TABLET ORAL at 16:27

## 2022-10-05 RX ADMIN — Medication 975 MILLIGRAM(S): at 05:38

## 2022-10-05 RX ADMIN — OCTREOTIDE ACETATE 100 MICROGRAM(S): 200 INJECTION, SOLUTION INTRAVENOUS; SUBCUTANEOUS at 17:55

## 2022-10-05 RX ADMIN — OXYCODONE HYDROCHLORIDE 10 MILLIGRAM(S): 5 TABLET ORAL at 09:22

## 2022-10-05 NOTE — PROGRESS NOTE ADULT - ASSESSMENT
Patient is a 72 year old female with a PMHx of DM2, anxiety, depression and HTN who presented to pre-surgical testing with diagnosis of pancreatic cancer.  Patient is now S/P ex-lap and Whipple on 9/23/22.      PLAN:  - Clear liquid diet  - Monitor MARGRET drain output  - Monitor MARGRET drain amylase daily  - Out of bed  - Pain control  - VTE prophylaxis with Lovenox subcutaneous      #[id number]  A Team Surgery Patient is a 72 year old female with a PMHx of DM2, anxiety, depression and HTN who presented to pre-surgical testing with diagnosis of pancreatic cancer.  Patient is now S/P ex-lap and Whipple on 9/23/22.      PLAN:  - Clear liquid diet  - Suppository  - CT AP w PO/IV cont today  - Monitor MARGRET drain amylase daily: draw R drain first, then wait for result before ordering L drain amylase  - Out of bed  - Pain control  - VTE prophylaxis with Lovenox subcutaneous      #[id number]  A Team Surgery Patient is a 72 year old female with a PMHx of DM2, anxiety, depression and HTN who presented to pre-surgical testing with diagnosis of pancreatic cancer.  Patient is now S/P ex-lap and Whipple on 9/23/22.      PLAN:  - Clear liquid diet  - Suppository  - CT AP w PO/IV cont today  - Monitor MARGRET drain amylase daily: draw R drain first, then wait for result before ordering L drain amylase  - Out of bed  - Pain control  - VTE prophylaxis with Lovenox subcutaneous    Patient will not require chemotherapy or radiation while at rehab.       #[id number]  A Team Surgery Patient is a 72 year old female with a PMHx of DM2, anxiety, depression and HTN who presented to pre-surgical testing with diagnosis of pancreatic cancer.  Patient is now S/P ex-lap and Whipple on 9/23/22.      PLAN:  - Clear liquid diet  - Octreotide 100mcg QD  - Suppository  - Monitor MARGRET drain amylase daily: draw R drain first, then wait for result before ordering L drain amylase  - Out of bed  - Pain control  - VTE prophylaxis with Lovenox subcutaneous  - dispo planning    Patient will not require chemotherapy or radiation while at rehab.       #[id number]  [facility] Team Surgery Patient is a 72 year old female with a PMHx of DM2, anxiety, depression and HTN who presented to pre-surgical testing with diagnosis of pancreatic cancer.  Patient is now S/P ex-lap and Whipple on 9/23/22.      PLAN:  - Clear liquid diet  - Suppository  - Monitor MARGRET drain amylase daily: draw R drain first, then wait for result before ordering L drain amylase  - Out of bed  - Pain control  - VTE prophylaxis with Lovenox subcutaneous    Patient will not require chemotherapy or radiation while at rehab.       #[id number]  A Team Surgery

## 2022-10-05 NOTE — PROGRESS NOTE ADULT - SUBJECTIVE AND OBJECTIVE BOX
Surgery Progress Note     Patient is a 72y old  Female who presents with a chief complaint of Pancreatic carcinoma (04 Oct 2022 14:50)      HPI:  This is a 73 y/o female whose native language is [place] Creole; comprehends and verbalizes minimal English. Refused hospital ; requested sister Mariaa function as . Patient   presents with diagnosis of pancreatic cancer on endoscopy biopsy. Subsequent CT scan and PET scan confirm pathology. Scheduled for whipple procedure pancreatic cancer (20 Sep 2022 07:10)      PAST MEDICAL & SURGICAL HISTORY:  Language barrier  native language [place] Creole; comprehends and verbzlizes minimal English      Type 2 diabetes mellitus      Anxiety and depression      Hypertension      Pancreatic cancer  September 2022      Uterine fibroid  hysterectomyu 1988      H/O jaundice  biliary stent in place          Physical Exam:    Vital Signs Last 24 Hrs  T(C): 36.8 (05 Oct 2022 13:55), Max: 37.3 (05 Oct 2022 08:37)  T(F): 98.2 (05 Oct 2022 13:55), Max: 99.2 (05 Oct 2022 08:37)  HR: 85 (05 Oct 2022 13:55) (85 - 104)  BP: 129/49 (05 Oct 2022 13:55) (129/49 - 158/50)  BP(mean): --  RR: 18 (05 Oct 2022 13:55) (17 - 18)  SpO2: 98% (05 Oct 2022 13:55) (95% - 100%)    Parameters below as of 05 Oct 2022 13:55  Patient On (Oxygen Delivery Method): room air        General appearance:  Sitting in the chair. C/ O gas pains. Did not want pain meds.        Drainage    Labs:                          9.1    13.07 )-----------( 613      ( 05 Oct 2022 05:59 )             28.6     10-05    135  |  97<L>  |  10  ----------------------------<  138<H>  4.0   |  24  |  0.61    Ca    8.3<L>      05 Oct 2022 05:59  Phos  2.0     10-05  Mg     1.90     10-05    TPro  5.0<L>  /  Alb  2.5<L>  /  TBili  0.3  /  DBili  x   /  AST  14  /  ALT  11  /  AlkPhos  198<H>  10-05          Assessment and Plan:

## 2022-10-05 NOTE — PROGRESS NOTE ADULT - SUBJECTIVE AND OBJECTIVE BOX
`TEAM Surgery Progress Note  Patient is a 72y old  Female who presents with a chief complaint of Pancreatic carcinoma (04 Oct 2022 14:50)      INTERVAL EVENTS: Patient is POD# ***No acute events overnight.  SUBJECTIVE: Patient seen and examined at bedside with surgical team, patient without complaints. Denies fever, chills, CP, SOB nausea, vomiting, abdominal pain.    REVIEW OF SYSTEMS:  Constitutional: No fevers or chills. No malaise or weakness.  EENT: No vision changes. No ear pain. No nasal congestion or rhinitis. No throat pain or dysphagia.  Respiratory: No cough, wheezing, or SOB. No hemoptysis.  Cardiovascular: No chest pain or palpitations.  Gastrointestinal: No abdominal pain. No nausea, vomiting, diarrhea or constipation. No hematochezia. No melena.  Genitourinary: No dysuria, hematuria, or frequency.  Neurologic: No numbness or tingling. No weakness.  Skin: No rashes or pruritus.     OBJECTIVE:    Vital Signs Last 24 Hrs  T(C): 37.2 (05 Oct 2022 00:23), Max: 37.2 (05 Oct 2022 00:23)  T(F): 98.9 (05 Oct 2022 00:23), Max: 98.9 (05 Oct 2022 00:23)  HR: 97 (05 Oct 2022 00:23) (86 - 104)  BP: 140/54 (05 Oct 2022 00:23) (140/54 - 158/50)  BP(mean): --  RR: 18 (05 Oct 2022 00:23) (17 - 18)  SpO2: 95% (05 Oct 2022 00:23) (95% - 100%)    Parameters below as of 05 Oct 2022 00:23  Patient On (Oxygen Delivery Method): room air    I&O's Detail    03 Oct 2022 07:01  -  04 Oct 2022 07:00  --------------------------------------------------------  IN:  Total IN: 0 mL    OUT:    Drain (mL): 127.5 mL    Drain (mL): 120 mL    Voided (mL): 1200 mL  Total OUT: 1447.5 mL    Total NET: -1447.5 mL      04 Oct 2022 07:01  -  05 Oct 2022 01:57  --------------------------------------------------------  IN:    Oral Fluid: 350 mL  Total IN: 350 mL    OUT:    Drain (mL): 37.5 mL    Drain (mL): 55 mL  Total OUT: 92.5 mL    Total NET: 257.5 mL      MEDICATIONS  (STANDING):  acetaminophen     Tablet .. 975 milliGRAM(s) Oral every 6 hours  artificial  tears Solution 1 Drop(s) Both EYES two times a day  enoxaparin Injectable 40 milliGRAM(s) SubCutaneous every 24 hours  insulin glargine Injectable (LANTUS) 9 Unit(s) SubCutaneous at bedtime  insulin lispro (ADMELOG) corrective regimen sliding scale   SubCutaneous Before meals and at bedtime  metoprolol tartrate 50 milliGRAM(s) Oral daily  octreotide  Injectable 100 MICROGram(s) SubCutaneous daily  pantoprazole  Injectable 40 milliGRAM(s) IV Push every 24 hours  risperiDONE   Tablet 2 milliGRAM(s) Oral at bedtime    MEDICATIONS  (PRN):  oxyCODONE    IR 5 milliGRAM(s) Oral every 4 hours PRN Moderate Pain (4 - 6)  oxyCODONE    IR 10 milliGRAM(s) Oral every 4 hours PRN Severe Pain (7 - 10)  sodium chloride 0.9% lock flush 10 milliLiter(s) IV Push every 1 hour PRN Pre/post blood products, medications, blood draw, and to maintain line patency      PHYSICAL EXAM:  Constitutional: A&Ox3, NAD  Respiratory: Unlabored breathing  Abdomen: Soft, nondistended, NTTP. No rebound or guarding.  Extremities: WWP, VENEGAS spontaneously    LABS:                        9.7    15.66 )-----------( 572      ( 04 Oct 2022 07:28 )             31.1     10-04    135  |  98  |  13  ----------------------------<  137<H>  4.4   |  25  |  0.62    Ca    8.1<L>      04 Oct 2022 07:28  Phos  2.0     10-04  Mg     1.90     10-04    TPro  5.2<L>  /  Alb  2.3<L>  /  TBili  0.3  /  DBili  x   /  AST  18  /  ALT  14  /  AlkPhos  205<H>  10-04      LIVER FUNCTIONS - ( 04 Oct 2022 07:28 )  Alb: 2.3 g/dL / Pro: 5.2 g/dL / ALK PHOS: 205 U/L / ALT: 14 U/L / AST: 18 U/L / GGT: x                 IMAGING:     `TEAM Surgery Progress Note  Patient is a 72y old  Female who presents with a chief complaint of Pancreatic carcinoma (04 Oct 2022 14:50)      INTERVAL EVENTS: Patient is POD# 13 No acute events overnight.  SUBJECTIVE: Patient seen and examined at bedside with surgical team, patient without complaints. Denies fever, chills, CP, SOB nausea, vomiting. Endorses some upper L-sided abdominal pain.    10-point Review of Systems is negative except for as noted above.     OBJECTIVE  T(C): 37.3 (10-05-22 @ 08:37), Max: 37.3 (10-05-22 @ 08:37)  HR: 98 (10-05-22 @ 08:37) (86 - 104)  BP: 145/64 (10-05-22 @ 08:37) (140/54 - 158/50)  RR: 17 (10-05-22 @ 08:37) (17 - 18)  SpO2: 95% (10-05-22 @ 08:37) (95% - 100%)  I&O's Summary    04 Oct 2022 07:01  -  05 Oct 2022 07:00  --------------------------------------------------------  IN: 350 mL / OUT: 92.5 mL / NET: 257.5 mL      I&O's Detail    04 Oct 2022 07:01  -  05 Oct 2022 07:00  --------------------------------------------------------  IN:    Oral Fluid: 350 mL  Total IN: 350 mL    OUT:    Drain (mL): 37.5 mL    Drain (mL): 55 mL  Total OUT: 92.5 mL    Total NET: 257.5 mL        LABS                        9.1    13.07 )-----------( 613      ( 05 Oct 2022 05:59 )             28.6     10-05    135  |  97<L>  |  10  ----------------------------<  138<H>  4.0   |  24  |  0.61    Ca    8.3<L>      05 Oct 2022 05:59  Phos  2.0     10-05  Mg     1.90     10-05    TPro  5.0<L>  /  Alb  2.5<L>  /  TBili  0.3  /  DBili  x   /  AST  14  /  ALT  11  /  AlkPhos  198<H>  10-05        ORDERS/MEDICATIONS  MEDICATIONS  (STANDING):  acetaminophen     Tablet .. 975 milliGRAM(s) Oral every 6 hours  artificial  tears Solution 1 Drop(s) Both EYES two times a day  bisacodyl Suppository 10 milliGRAM(s) Rectal once  enoxaparin Injectable 40 milliGRAM(s) SubCutaneous every 24 hours  insulin glargine Injectable (LANTUS) 9 Unit(s) SubCutaneous at bedtime  insulin lispro (ADMELOG) corrective regimen sliding scale   SubCutaneous Before meals and at bedtime  magnesium sulfate  IVPB 1 Gram(s) IV Intermittent once  metoprolol tartrate 50 milliGRAM(s) Oral daily  octreotide  Injectable 100 MICROGram(s) SubCutaneous daily  pantoprazole  Injectable 40 milliGRAM(s) IV Push every 24 hours  potassium phosphate / sodium phosphate Powder (PHOS-NaK) 1 Packet(s) Oral two times a day  risperiDONE   Tablet 2 milliGRAM(s) Oral at bedtime    MEDICATIONS  (PRN):  oxyCODONE    IR 5 milliGRAM(s) Oral every 4 hours PRN Moderate Pain (4 - 6)  oxyCODONE    IR 10 milliGRAM(s) Oral every 4 hours PRN Severe Pain (7 - 10)  sodium chloride 0.9% lock flush 10 milliLiter(s) IV Push every 1 hour PRN Pre/post blood products, medications, blood draw, and to maintain line patency      PHYSICAL EXAM:  Constitutional: A&Ox3, NAD  Respiratory: Unlabored breathing  Abdomen: Soft, distended in bilateral upper quadrants. Tender to palpation upper abdomen L > R wout rebound or guarding  Extremities: WWP, VENEGAS spontaneously     A`TEAM Surgery Progress Note  Patient is a 72y old  Female who presents with a chief complaint of Pancreatic carcinoma (04 Oct 2022 14:50)      INTERVAL EVENTS: Patient is POD# 13 No acute events overnight. VSS. +/+    SUBJECTIVE: Patient seen and examined at bedside with surgical team, patient without complaints. Denies fever, chills, CP, SOB nausea, vomiting. Endorses some upper L-sided abdominal pain.    10-point Review of Systems is negative except for as noted above.     OBJECTIVE  T(C): 37.3 (10-05-22 @ 08:37), Max: 37.3 (10-05-22 @ 08:37)  HR: 98 (10-05-22 @ 08:37) (86 - 104)  BP: 145/64 (10-05-22 @ 08:37) (140/54 - 158/50)  RR: 17 (10-05-22 @ 08:37) (17 - 18)  SpO2: 95% (10-05-22 @ 08:37) (95% - 100%)  I&O's Summary    04 Oct 2022 07:01  -  05 Oct 2022 07:00  --------------------------------------------------------  IN: 350 mL / OUT: 92.5 mL / NET: 257.5 mL      I&O's Detail    04 Oct 2022 07:01  -  05 Oct 2022 07:00  --------------------------------------------------------  IN:    Oral Fluid: 350 mL  Total IN: 350 mL    OUT:    Drain (mL): 37.5 mL    Drain (mL): 55 mL  Total OUT: 92.5 mL    Total NET: 257.5 mL        LABS                        9.1    13.07 )-----------( 613      ( 05 Oct 2022 05:59 )             28.6     10-05    135  |  97<L>  |  10  ----------------------------<  138<H>  4.0   |  24  |  0.61    Ca    8.3<L>      05 Oct 2022 05:59  Phos  2.0     10-05  Mg     1.90     10-05    TPro  5.0<L>  /  Alb  2.5<L>  /  TBili  0.3  /  DBili  x   /  AST  14  /  ALT  11  /  AlkPhos  198<H>  10-05        ORDERS/MEDICATIONS  MEDICATIONS  (STANDING):  acetaminophen     Tablet .. 975 milliGRAM(s) Oral every 6 hours  artificial  tears Solution 1 Drop(s) Both EYES two times a day  bisacodyl Suppository 10 milliGRAM(s) Rectal once  enoxaparin Injectable 40 milliGRAM(s) SubCutaneous every 24 hours  insulin glargine Injectable (LANTUS) 9 Unit(s) SubCutaneous at bedtime  insulin lispro (ADMELOG) corrective regimen sliding scale   SubCutaneous Before meals and at bedtime  magnesium sulfate  IVPB 1 Gram(s) IV Intermittent once  metoprolol tartrate 50 milliGRAM(s) Oral daily  octreotide  Injectable 100 MICROGram(s) SubCutaneous daily  pantoprazole  Injectable 40 milliGRAM(s) IV Push every 24 hours  potassium phosphate / sodium phosphate Powder (PHOS-NaK) 1 Packet(s) Oral two times a day  risperiDONE   Tablet 2 milliGRAM(s) Oral at bedtime    MEDICATIONS  (PRN):  oxyCODONE    IR 5 milliGRAM(s) Oral every 4 hours PRN Moderate Pain (4 - 6)  oxyCODONE    IR 10 milliGRAM(s) Oral every 4 hours PRN Severe Pain (7 - 10)  sodium chloride 0.9% lock flush 10 milliLiter(s) IV Push every 1 hour PRN Pre/post blood products, medications, blood draw, and to maintain line patency      PHYSICAL EXAM:  Constitutional: A&Ox3, NAD  Respiratory: Unlabored breathing  Abdomen: Soft, distended in bilateral upper quadrants. Tender to palpation upper abdomen L > R without rebound or guarding. R MARGRET drain with serous output. L MARGRET drain with milky yellow output.   Extremities: WWP, VENEGAS spontaneously

## 2022-10-06 LAB
ALBUMIN SERPL ELPH-MCNC: 2.3 G/DL — LOW (ref 3.3–5)
ALP SERPL-CCNC: 224 U/L — HIGH (ref 40–120)
ALT FLD-CCNC: 13 U/L — SIGNIFICANT CHANGE UP (ref 4–33)
AMYLASE FLD-CCNC: 1041 U/L — SIGNIFICANT CHANGE UP
AMYLASE FLD-CCNC: 671 U/L — SIGNIFICANT CHANGE UP
ANION GAP SERPL CALC-SCNC: 9 MMOL/L — SIGNIFICANT CHANGE UP (ref 7–14)
AST SERPL-CCNC: 18 U/L — SIGNIFICANT CHANGE UP (ref 4–32)
BILIRUB SERPL-MCNC: 0.3 MG/DL — SIGNIFICANT CHANGE UP (ref 0.2–1.2)
BUN SERPL-MCNC: 10 MG/DL — SIGNIFICANT CHANGE UP (ref 7–23)
CALCIUM SERPL-MCNC: 8 MG/DL — LOW (ref 8.4–10.5)
CHLORIDE SERPL-SCNC: 99 MMOL/L — SIGNIFICANT CHANGE UP (ref 98–107)
CO2 SERPL-SCNC: 28 MMOL/L — SIGNIFICANT CHANGE UP (ref 22–31)
CREAT SERPL-MCNC: 0.55 MG/DL — SIGNIFICANT CHANGE UP (ref 0.5–1.3)
CULTURE RESULTS: SIGNIFICANT CHANGE UP
CULTURE RESULTS: SIGNIFICANT CHANGE UP
EGFR: 97 ML/MIN/1.73M2 — SIGNIFICANT CHANGE UP
GLUCOSE BLDC GLUCOMTR-MCNC: 106 MG/DL — HIGH (ref 70–99)
GLUCOSE BLDC GLUCOMTR-MCNC: 109 MG/DL — HIGH (ref 70–99)
GLUCOSE BLDC GLUCOMTR-MCNC: 148 MG/DL — HIGH (ref 70–99)
GLUCOSE BLDC GLUCOMTR-MCNC: 195 MG/DL — HIGH (ref 70–99)
GLUCOSE SERPL-MCNC: 96 MG/DL — SIGNIFICANT CHANGE UP (ref 70–99)
HCT VFR BLD CALC: 28.1 % — LOW (ref 34.5–45)
HGB BLD-MCNC: 8.9 G/DL — LOW (ref 11.5–15.5)
MAGNESIUM SERPL-MCNC: 2 MG/DL — SIGNIFICANT CHANGE UP (ref 1.6–2.6)
MCHC RBC-ENTMCNC: 29 PG — SIGNIFICANT CHANGE UP (ref 27–34)
MCHC RBC-ENTMCNC: 31.7 GM/DL — LOW (ref 32–36)
MCV RBC AUTO: 91.5 FL — SIGNIFICANT CHANGE UP (ref 80–100)
NRBC # BLD: 0 /100 WBCS — SIGNIFICANT CHANGE UP (ref 0–0)
NRBC # FLD: 0 K/UL — SIGNIFICANT CHANGE UP (ref 0–0)
PHOSPHATE SERPL-MCNC: 1.8 MG/DL — LOW (ref 2.5–4.5)
PLATELET # BLD AUTO: 688 K/UL — HIGH (ref 150–400)
POTASSIUM SERPL-MCNC: 3.9 MMOL/L — SIGNIFICANT CHANGE UP (ref 3.5–5.3)
POTASSIUM SERPL-SCNC: 3.9 MMOL/L — SIGNIFICANT CHANGE UP (ref 3.5–5.3)
PROT SERPL-MCNC: 5.1 G/DL — LOW (ref 6–8.3)
RBC # BLD: 3.07 M/UL — LOW (ref 3.8–5.2)
RBC # FLD: 13.1 % — SIGNIFICANT CHANGE UP (ref 10.3–14.5)
SODIUM SERPL-SCNC: 136 MMOL/L — SIGNIFICANT CHANGE UP (ref 135–145)
SPECIMEN SOURCE: SIGNIFICANT CHANGE UP
SPECIMEN SOURCE: SIGNIFICANT CHANGE UP
WBC # BLD: 11.7 K/UL — HIGH (ref 3.8–10.5)
WBC # FLD AUTO: 11.7 K/UL — HIGH (ref 3.8–10.5)

## 2022-10-06 RX ORDER — OXYCODONE HYDROCHLORIDE 5 MG/1
5 TABLET ORAL EVERY 4 HOURS
Refills: 0 | Status: DISCONTINUED | OUTPATIENT
Start: 2022-10-06 | End: 2022-10-09

## 2022-10-06 RX ORDER — SODIUM,POTASSIUM PHOSPHATES 278-250MG
1 POWDER IN PACKET (EA) ORAL THREE TIMES A DAY
Refills: 0 | Status: COMPLETED | OUTPATIENT
Start: 2022-10-06 | End: 2022-10-07

## 2022-10-06 RX ORDER — RISPERIDONE 4 MG/1
2 TABLET ORAL AT BEDTIME
Refills: 0 | Status: DISCONTINUED | OUTPATIENT
Start: 2022-10-06 | End: 2022-10-20

## 2022-10-06 RX ORDER — OXYCODONE HYDROCHLORIDE 5 MG/1
10 TABLET ORAL EVERY 4 HOURS
Refills: 0 | Status: DISCONTINUED | OUTPATIENT
Start: 2022-10-06 | End: 2022-10-11

## 2022-10-06 RX ADMIN — Medication 975 MILLIGRAM(S): at 19:33

## 2022-10-06 RX ADMIN — Medication 975 MILLIGRAM(S): at 11:34

## 2022-10-06 RX ADMIN — Medication 975 MILLIGRAM(S): at 19:03

## 2022-10-06 RX ADMIN — OXYCODONE HYDROCHLORIDE 10 MILLIGRAM(S): 5 TABLET ORAL at 19:18

## 2022-10-06 RX ADMIN — RISPERIDONE 2 MILLIGRAM(S): 4 TABLET ORAL at 22:00

## 2022-10-06 RX ADMIN — Medication 975 MILLIGRAM(S): at 05:56

## 2022-10-06 RX ADMIN — Medication 1 DROP(S): at 05:55

## 2022-10-06 RX ADMIN — OCTREOTIDE ACETATE 100 MICROGRAM(S): 200 INJECTION, SOLUTION INTRAVENOUS; SUBCUTANEOUS at 19:04

## 2022-10-06 RX ADMIN — Medication 2: at 19:09

## 2022-10-06 RX ADMIN — ENOXAPARIN SODIUM 40 MILLIGRAM(S): 100 INJECTION SUBCUTANEOUS at 11:33

## 2022-10-06 RX ADMIN — INSULIN GLARGINE 9 UNIT(S): 100 INJECTION, SOLUTION SUBCUTANEOUS at 22:00

## 2022-10-06 RX ADMIN — Medication 1 PACKET(S): at 22:00

## 2022-10-06 RX ADMIN — OXYCODONE HYDROCHLORIDE 10 MILLIGRAM(S): 5 TABLET ORAL at 11:33

## 2022-10-06 RX ADMIN — Medication 50 MILLIGRAM(S): at 05:55

## 2022-10-06 RX ADMIN — Medication 1 PACKET(S): at 15:20

## 2022-10-06 RX ADMIN — OXYCODONE HYDROCHLORIDE 10 MILLIGRAM(S): 5 TABLET ORAL at 19:48

## 2022-10-06 RX ADMIN — Medication 975 MILLIGRAM(S): at 12:04

## 2022-10-06 RX ADMIN — Medication 10 MILLIGRAM(S): at 22:06

## 2022-10-06 RX ADMIN — PANTOPRAZOLE SODIUM 40 MILLIGRAM(S): 20 TABLET, DELAYED RELEASE ORAL at 11:34

## 2022-10-06 RX ADMIN — Medication 1 DROP(S): at 19:03

## 2022-10-06 RX ADMIN — OXYCODONE HYDROCHLORIDE 10 MILLIGRAM(S): 5 TABLET ORAL at 12:03

## 2022-10-06 NOTE — PROGRESS NOTE ADULT - ASSESSMENT
Patient is a 72 year old female with a PMHx of DM2, anxiety, depression and HTN who presented to pre-surgical testing with diagnosis of pancreatic cancer.  Patient is now S/P ex-lap and Whipple on 9/23/22.      PLAN:  - Clear liquid diet  - Suppository  - CT AP w PO/IV cont today  - Monitor MARGRET drain amylase daily: draw R drain first, then wait for result before ordering L drain amylase  - Out of bed  - Pain control  - VTE prophylaxis with Lovenox subcutaneous    Patient will not require chemotherapy or radiation while at rehab.       #[id number]  A Team Surgery Patient is a 72 year old female with a PMHx of DM2, anxiety, depression and HTN who presented to pre-surgical testing with diagnosis of pancreatic cancer.  Patient is now S/P ex-lap and Whipple on 9/23/22.      PLAN:  - Clear liquid diet  - Octreotide 100mcg QD  - Suppository  - CT AP w PO/IV cont today  - Monitor MARGRET drain amylase daily: draw R drain first, then wait for result before ordering L drain amylase  - Out of bed  - Pain control  - VTE prophylaxis with Lovenox subcutaneous  - dispo planning    Patient will not require chemotherapy or radiation while at rehab.       #[id number]  A Team Surgery

## 2022-10-06 NOTE — PROGRESS NOTE ADULT - SUBJECTIVE AND OBJECTIVE BOX
POD # 13    Subjective: I ate regular food today but I HAD SOME ABDOMINAL PAIN. I walked around the floor.    Objective:   Vital Signs Last 24 Hrs  T(C): 36.7 (06 Oct 2022 17:11), Max: 37.4 (06 Oct 2022 11:51)  T(F): 98.1 (06 Oct 2022 17:11), Max: 99.3 (06 Oct 2022 11:51)  HR: 81 (06 Oct 2022 17:11) (81 - 97)  BP: 123/39 (06 Oct 2022 17:11) (115/49 - 148/49)  BP(mean): --  RR: 18 (06 Oct 2022 17:11) (18 - 18)  SpO2: 97% (06 Oct 2022 17:11) (95% - 100%)    Parameters below as of 06 Oct 2022 17:11  Patient On (Oxygen Delivery Method): room air        Daily     Daily     Heent: N/C, AT PER no scleral icterus, No JVD  Pul: clear  Cor: RRR  Abdomen: soft, normal BS. Wound - clean - Staples in place. Drains stripped.  Output decreasing.  Extremities: without edema, motor/sensory intact    I&O's Detail    05 Oct 2022 07:01  -  06 Oct 2022 07:00  --------------------------------------------------------  IN:    Oral Fluid: 440 mL  Total IN: 440 mL    OUT:    Drain (mL): 65 mL    Drain (mL): 30 mL    Voided (mL): 200 mL  Total OUT: 295 mL    Total NET: 145 mL      06 Oct 2022 07:01  -  06 Oct 2022 17:49  --------------------------------------------------------  IN:  Total IN: 0 mL    OUT:    Drain (mL): 20 mL    Drain (mL): 20 mL    Voided (mL): 650 mL  Total OUT: 690 mL    Total NET: -690 mL          MEDICATIONS  (STANDING):  acetaminophen     Tablet .. 975 milliGRAM(s) Oral every 6 hours  artificial  tears Solution 1 Drop(s) Both EYES two times a day  bisacodyl Suppository 10 milliGRAM(s) Rectal once  enoxaparin Injectable 40 milliGRAM(s) SubCutaneous every 24 hours  insulin glargine Injectable (LANTUS) 9 Unit(s) SubCutaneous at bedtime  insulin lispro (ADMELOG) corrective regimen sliding scale   SubCutaneous Before meals and at bedtime  metoprolol tartrate 50 milliGRAM(s) Oral daily  octreotide  Injectable 100 MICROGram(s) SubCutaneous daily  pantoprazole  Injectable 40 milliGRAM(s) IV Push every 24 hours  potassium phosphate / sodium phosphate Powder (PHOS-NaK) 1 Packet(s) Oral three times a day  risperiDONE   Tablet 2 milliGRAM(s) Oral at bedtime    MEDICATIONS  (PRN):  oxyCODONE    IR 5 milliGRAM(s) Oral every 4 hours PRN Moderate Pain (4 - 6)  oxyCODONE    IR 10 milliGRAM(s) Oral every 4 hours PRN Severe Pain (7 - 10)  sodium chloride 0.9% lock flush 10 milliLiter(s) IV Push every 1 hour PRN Pre/post blood products, medications, blood draw, and to maintain line patency                            8.9    11.70 )-----------( 688      ( 06 Oct 2022 06:51 )             28.1     10-06    136  |  99  |  10  ----------------------------<  96  3.9   |  28  |  0.55    Ca    8.0<L>      06 Oct 2022 06:51  Phos  1.8     10-06  Mg     2.00     10-06    TPro  5.1<L>  /  Alb  2.3<L>  /  TBili  0.3  /  DBili  x   /  AST  18  /  ALT  13  /  AlkPhos  224<H>  10-06        Radiologic Studies:< from: CT Abdomen and Pelvis w/ Oral Cont and w/ IV Cont (10.05.22 @ 19:06) >  Increased size of multiple abdominal collections in the   surgical bed since 9/30/2022 comparison. A collection noted near the   pancreatic tail previously measuring 5.2 x 3.1 cm now measures 6.3 x 3.1   cm (4:43) and communicates with previously noted collection that measured   1.8 x 1.6 cm on prior. There is new contains air and peripheral   enhancement associated within this collection. These findings raise   concern for necrotizing infection versus walled off bowel  leak.   Additionally, a collection in the expected region of the pancreatic head   previously measuring 3.3 x 2.3 cm now measures at least 4.8 x 2.1 cm   (4:53). There is thin peripheral enhancement surrounding this collection,   concerning for developing abscess. Small volume ascites. Two surgical   drains terminating in the mid abdomen.    < end of copied text >

## 2022-10-06 NOTE — PROGRESS NOTE ADULT - ASSESSMENT
S/P Whipple procedure  Faye-pancreatic collection but no evidence of sepsis - WBC decreasing to 12,000 and Rod drains output decrease as well - may need more frequent stripping to allow evacuation fo collections described on CT scan.

## 2022-10-06 NOTE — PROGRESS NOTE ADULT - SUBJECTIVE AND OBJECTIVE BOX
`TEAM *** Surgery Progress Note  Patient is a 72y old  Female who presents with a chief complaint of Pancreatic carcinoma (04 Oct 2022 14:50)      Overnight Events: ***No acute events overnight.  SUBJECTIVE: Patient seen and examined at bedside with surgical team, patient without complaints. Denies fever, chills, CP, SOB nausea, vomiting, abdominal pain.    START [id number]    135  |  97<L>  |  10  ----------------------------<  138<H>  4.0   |  24  |  0.61    Ca    8.3<L>      05 Oct 2022 05:59  Phos  2.0     10-05  Mg     1.90     10-05    TPro  5.0<L>  /  Alb  2.5<L>  /  TBili  0.3  /  DBili  x   /  AST  14  /  ALT  11  /  AlkPhos  198<H>  10-05  POCT Blood Glucose.: 111 mg/dL (10-05-22 @ 23:32)  A1C with Estimated Average Glucose Result: 6.5 % (09-20-22 @ 07:11)  END CHEMFISH  START MEDSACTIVEMEDICATIONS  (STANDING):  acetaminophen     Tablet .. 975 milliGRAM(s) Oral every 6 hours  artificial  tears Solution 1 Drop(s) Both EYES two times a day  bisacodyl Suppository 10 milliGRAM(s) Rectal once  enoxaparin Injectable 40 milliGRAM(s) SubCutaneous every 24 hours  insulin glargine Injectable (LANTUS) 9 Unit(s) SubCutaneous at bedtime  insulin lispro (ADMELOG) corrective regimen sliding scale   SubCutaneous Before meals and at bedtime  metoprolol tartrate 50 milliGRAM(s) Oral daily  octreotide  Injectable 100 MICROGram(s) SubCutaneous daily  pantoprazole  Injectable 40 milliGRAM(s) IV Push every 24 hours  risperiDONE   Tablet 2 milliGRAM(s) Oral at bedtime    MEDICATIONS  (PRN):  oxyCODONE    IR 5 milliGRAM(s) Oral every 4 hours PRN Moderate Pain (4 - 6)  oxyCODONE    IR 10 milliGRAM(s) Oral every 4 hours PRN Severe Pain (7 - 10)  sodium chloride 0.9% lock flush 10 milliLiter(s) IV Push every 1 hour PRN Pre/post blood products, medications, blood draw, and to maintain line patency  END MEDSACTIVE  START DIETORDEREND DIETORDER  START ADMITDXMalignant neoplasm of pancreas    END ADMITDX  START IOFS  END IOFS  START SKINPUEND SKINPU    OBJECTIVE:    Vital Signs Last 24 Hrs  T(C): 37.1 (05 Oct 2022 21:17), Max: 37.3 (05 Oct 2022 08:37)  T(F): 98.8 (05 Oct 2022 21:17), Max: 99.2 (05 Oct 2022 08:37)  HR: 97 (05 Oct 2022 21:17) (85 - 104)  BP: 145/47 (05 Oct 2022 21:17) (127/40 - 152/50)  BP(mean): --  RR: 18 (05 Oct 2022 21:17) (17 - 18)  SpO2: 100% (05 Oct 2022 21:17) (95% - 100%)    Parameters below as of 05 Oct 2022 21:17  Patient On (Oxygen Delivery Method): room air    I&O's Detail    04 Oct 2022 07:01  -  05 Oct 2022 07:00  --------------------------------------------------------  IN:    Oral Fluid: 350 mL  Total IN: 350 mL    OUT:    Drain (mL): 37.5 mL    Drain (mL): 55 mL  Total OUT: 92.5 mL    Total NET: 257.5 mL      05 Oct 2022 07:01  -  06 Oct 2022 00:19  --------------------------------------------------------  IN:  Total IN: 0 mL    OUT:    Drain (mL): 40 mL    Drain (mL): 10 mL    Voided (mL): 200 mL  Total OUT: 250 mL    Total NET: -250 mL      MEDICATIONS  (STANDING):  acetaminophen     Tablet .. 975 milliGRAM(s) Oral every 6 hours  artificial  tears Solution 1 Drop(s) Both EYES two times a day  bisacodyl Suppository 10 milliGRAM(s) Rectal once  enoxaparin Injectable 40 milliGRAM(s) SubCutaneous every 24 hours  insulin glargine Injectable (LANTUS) 9 Unit(s) SubCutaneous at bedtime  insulin lispro (ADMELOG) corrective regimen sliding scale   SubCutaneous Before meals and at bedtime  metoprolol tartrate 50 milliGRAM(s) Oral daily  octreotide  Injectable 100 MICROGram(s) SubCutaneous daily  pantoprazole  Injectable 40 milliGRAM(s) IV Push every 24 hours  risperiDONE   Tablet 2 milliGRAM(s) Oral at bedtime    MEDICATIONS  (PRN):  oxyCODONE    IR 5 milliGRAM(s) Oral every 4 hours PRN Moderate Pain (4 - 6)  oxyCODONE    IR 10 milliGRAM(s) Oral every 4 hours PRN Severe Pain (7 - 10)  sodium chloride 0.9% lock flush 10 milliLiter(s) IV Push every 1 hour PRN Pre/post blood products, medications, blood draw, and to maintain line patency      PHYSICAL EXAM:  Constitutional: A&Ox3, NAD  Respiratory: Unlabored breathing  Abdomen: Soft, nondistended, NTTP. No rebound or guarding.  Extremities: WWP, VENEGAS spontaneously    LABS:                        9.1    13.07 )-----------( 613      ( 05 Oct 2022 05:59 )             28.6     10-05    135  |  97<L>  |  10  ----------------------------<  138<H>  4.0   |  24  |  0.61    Ca    8.3<L>      05 Oct 2022 05:59  Phos  2.0     10-05  Mg     1.90     10-05    TPro  5.0<L>  /  Alb  2.5<L>  /  TBili  0.3  /  DBili  x   /  AST  14  /  ALT  11  /  AlkPhos  198<H>  10-05      LIVER FUNCTIONS - ( 05 Oct 2022 05:59 )  Alb: 2.5 g/dL / Pro: 5.0 g/dL / ALK PHOS: 198 U/L / ALT: 11 U/L / AST: 14 U/L / GGT: x                 IMAGING:     `A TEAM Surgery Progress Note  Patient is a 72y old  Female who presents with a chief complaint of Pancreatic carcinoma (04 Oct 2022 14:50)      Overnight Events: No acute events overnight. VSS. +/+    SUBJECTIVE: Patient seen and examined at bedside with surgical team, patient without complaints. Denies fever, chills, CP, SOB nausea, vomiting, abdominal pain.    START [id number]    135  |  97<L>  |  10  ----------------------------<  138<H>  4.0   |  24  |  0.61    Ca    8.3<L>      05 Oct 2022 05:59  Phos  2.0     10-05  Mg     1.90     10-05    TPro  5.0<L>  /  Alb  2.5<L>  /  TBili  0.3  /  DBili  x   /  AST  14  /  ALT  11  /  AlkPhos  198<H>  10-05  POCT Blood Glucose.: 111 mg/dL (10-05-22 @ 23:32)  A1C with Estimated Average Glucose Result: 6.5 % (09-20-22 @ 07:11)  END CHEMFISH  START MEDSACTIVEMEDICATIONS  (STANDING):  acetaminophen     Tablet .. 975 milliGRAM(s) Oral every 6 hours  artificial  tears Solution 1 Drop(s) Both EYES two times a day  bisacodyl Suppository 10 milliGRAM(s) Rectal once  enoxaparin Injectable 40 milliGRAM(s) SubCutaneous every 24 hours  insulin glargine Injectable (LANTUS) 9 Unit(s) SubCutaneous at bedtime  insulin lispro (ADMELOG) corrective regimen sliding scale   SubCutaneous Before meals and at bedtime  metoprolol tartrate 50 milliGRAM(s) Oral daily  octreotide  Injectable 100 MICROGram(s) SubCutaneous daily  pantoprazole  Injectable 40 milliGRAM(s) IV Push every 24 hours  risperiDONE   Tablet 2 milliGRAM(s) Oral at bedtime    MEDICATIONS  (PRN):  oxyCODONE    IR 5 milliGRAM(s) Oral every 4 hours PRN Moderate Pain (4 - 6)  oxyCODONE    IR 10 milliGRAM(s) Oral every 4 hours PRN Severe Pain (7 - 10)  sodium chloride 0.9% lock flush 10 milliLiter(s) IV Push every 1 hour PRN Pre/post blood products, medications, blood draw, and to maintain line patency  END MEDSACTIVE  START DIETORDEREND DIETORDER  START ADMITDXMalignant neoplasm of pancreas    END ADMITDX  START IOFS  END IOFS  START SKINPUEND SKINPU    OBJECTIVE:    Vital Signs Last 24 Hrs  T(C): 37.1 (05 Oct 2022 21:17), Max: 37.3 (05 Oct 2022 08:37)  T(F): 98.8 (05 Oct 2022 21:17), Max: 99.2 (05 Oct 2022 08:37)  HR: 97 (05 Oct 2022 21:17) (85 - 104)  BP: 145/47 (05 Oct 2022 21:17) (127/40 - 152/50)  BP(mean): --  RR: 18 (05 Oct 2022 21:17) (17 - 18)  SpO2: 100% (05 Oct 2022 21:17) (95% - 100%)    Parameters below as of 05 Oct 2022 21:17  Patient On (Oxygen Delivery Method): room air    I&O's Detail    04 Oct 2022 07:01  -  05 Oct 2022 07:00  --------------------------------------------------------  IN:    Oral Fluid: 350 mL  Total IN: 350 mL    OUT:    Drain (mL): 37.5 mL    Drain (mL): 55 mL  Total OUT: 92.5 mL    Total NET: 257.5 mL      05 Oct 2022 07:01  -  06 Oct 2022 00:19  --------------------------------------------------------  IN:  Total IN: 0 mL    OUT:    Drain (mL): 40 mL    Drain (mL): 10 mL    Voided (mL): 200 mL  Total OUT: 250 mL    Total NET: -250 mL      MEDICATIONS  (STANDING):  acetaminophen     Tablet .. 975 milliGRAM(s) Oral every 6 hours  artificial  tears Solution 1 Drop(s) Both EYES two times a day  bisacodyl Suppository 10 milliGRAM(s) Rectal once  enoxaparin Injectable 40 milliGRAM(s) SubCutaneous every 24 hours  insulin glargine Injectable (LANTUS) 9 Unit(s) SubCutaneous at bedtime  insulin lispro (ADMELOG) corrective regimen sliding scale   SubCutaneous Before meals and at bedtime  metoprolol tartrate 50 milliGRAM(s) Oral daily  octreotide  Injectable 100 MICROGram(s) SubCutaneous daily  pantoprazole  Injectable 40 milliGRAM(s) IV Push every 24 hours  risperiDONE   Tablet 2 milliGRAM(s) Oral at bedtime    MEDICATIONS  (PRN):  oxyCODONE    IR 5 milliGRAM(s) Oral every 4 hours PRN Moderate Pain (4 - 6)  oxyCODONE    IR 10 milliGRAM(s) Oral every 4 hours PRN Severe Pain (7 - 10)  sodium chloride 0.9% lock flush 10 milliLiter(s) IV Push every 1 hour PRN Pre/post blood products, medications, blood draw, and to maintain line patency      PHYSICAL EXAM:  Constitutional: A&Ox3, NAD  Respiratory: Unlabored breathing  Abdomen: Soft, distended in bilateral upper quadrants. Tender to palpation upper abdomen L > R without rebound or guarding. R MARGRET drain with serous output. L MARGRET drain with milky yellow output.   Extremities: WWP, VENEGAS spontaneously    LABS:                        9.1    13.07 )-----------( 613      ( 05 Oct 2022 05:59 )             28.6     10-05    135  |  97<L>  |  10  ----------------------------<  138<H>  4.0   |  24  |  0.61    Ca    8.3<L>      05 Oct 2022 05:59  Phos  2.0     10-05  Mg     1.90     10-05    TPro  5.0<L>  /  Alb  2.5<L>  /  TBili  0.3  /  DBili  x   /  AST  14  /  ALT  11  /  AlkPhos  198<H>  10-05      LIVER FUNCTIONS - ( 05 Oct 2022 05:59 )  Alb: 2.5 g/dL / Pro: 5.0 g/dL / ALK PHOS: 198 U/L / ALT: 11 U/L / AST: 14 U/L / GGT: x                 IMAGING:

## 2022-10-07 LAB
ALBUMIN SERPL ELPH-MCNC: 2.4 G/DL — LOW (ref 3.3–5)
ALP SERPL-CCNC: 255 U/L — HIGH (ref 40–120)
ALT FLD-CCNC: 15 U/L — SIGNIFICANT CHANGE UP (ref 4–33)
AMYLASE FLD-CCNC: 24 U/L — SIGNIFICANT CHANGE UP
AMYLASE FLD-CCNC: 935 U/L — SIGNIFICANT CHANGE UP
ANION GAP SERPL CALC-SCNC: 8 MMOL/L — SIGNIFICANT CHANGE UP (ref 7–14)
AST SERPL-CCNC: 18 U/L — SIGNIFICANT CHANGE UP (ref 4–32)
BILIRUB SERPL-MCNC: 0.3 MG/DL — SIGNIFICANT CHANGE UP (ref 0.2–1.2)
BUN SERPL-MCNC: 12 MG/DL — SIGNIFICANT CHANGE UP (ref 7–23)
CALCIUM SERPL-MCNC: 8 MG/DL — LOW (ref 8.4–10.5)
CHLORIDE SERPL-SCNC: 100 MMOL/L — SIGNIFICANT CHANGE UP (ref 98–107)
CO2 SERPL-SCNC: 28 MMOL/L — SIGNIFICANT CHANGE UP (ref 22–31)
CREAT SERPL-MCNC: 0.6 MG/DL — SIGNIFICANT CHANGE UP (ref 0.5–1.3)
EGFR: 95 ML/MIN/1.73M2 — SIGNIFICANT CHANGE UP
GLUCOSE BLDC GLUCOMTR-MCNC: 112 MG/DL — HIGH (ref 70–99)
GLUCOSE BLDC GLUCOMTR-MCNC: 147 MG/DL — HIGH (ref 70–99)
GLUCOSE BLDC GLUCOMTR-MCNC: 180 MG/DL — HIGH (ref 70–99)
GLUCOSE BLDC GLUCOMTR-MCNC: 191 MG/DL — HIGH (ref 70–99)
GLUCOSE BLDC GLUCOMTR-MCNC: 220 MG/DL — HIGH (ref 70–99)
GLUCOSE SERPL-MCNC: 109 MG/DL — HIGH (ref 70–99)
HCT VFR BLD CALC: 31.3 % — LOW (ref 34.5–45)
HGB BLD-MCNC: 9.7 G/DL — LOW (ref 11.5–15.5)
MAGNESIUM SERPL-MCNC: 2 MG/DL — SIGNIFICANT CHANGE UP (ref 1.6–2.6)
MCHC RBC-ENTMCNC: 28.6 PG — SIGNIFICANT CHANGE UP (ref 27–34)
MCHC RBC-ENTMCNC: 31 GM/DL — LOW (ref 32–36)
MCV RBC AUTO: 92.3 FL — SIGNIFICANT CHANGE UP (ref 80–100)
NRBC # BLD: 0 /100 WBCS — SIGNIFICANT CHANGE UP (ref 0–0)
NRBC # FLD: 0 K/UL — SIGNIFICANT CHANGE UP (ref 0–0)
PHOSPHATE SERPL-MCNC: 2.2 MG/DL — LOW (ref 2.5–4.5)
PLATELET # BLD AUTO: 698 K/UL — HIGH (ref 150–400)
POTASSIUM SERPL-MCNC: 4.1 MMOL/L — SIGNIFICANT CHANGE UP (ref 3.5–5.3)
POTASSIUM SERPL-SCNC: 4.1 MMOL/L — SIGNIFICANT CHANGE UP (ref 3.5–5.3)
PROT SERPL-MCNC: 5.2 G/DL — LOW (ref 6–8.3)
RBC # BLD: 3.39 M/UL — LOW (ref 3.8–5.2)
RBC # FLD: 13.2 % — SIGNIFICANT CHANGE UP (ref 10.3–14.5)
SARS-COV-2 RNA SPEC QL NAA+PROBE: SIGNIFICANT CHANGE UP
SODIUM SERPL-SCNC: 136 MMOL/L — SIGNIFICANT CHANGE UP (ref 135–145)
WBC # BLD: 10.92 K/UL — HIGH (ref 3.8–10.5)
WBC # FLD AUTO: 10.92 K/UL — HIGH (ref 3.8–10.5)

## 2022-10-07 RX ADMIN — OCTREOTIDE ACETATE 100 MICROGRAM(S): 200 INJECTION, SOLUTION INTRAVENOUS; SUBCUTANEOUS at 17:23

## 2022-10-07 RX ADMIN — Medication 63.75 MILLIMOLE(S): at 10:25

## 2022-10-07 RX ADMIN — OXYCODONE HYDROCHLORIDE 10 MILLIGRAM(S): 5 TABLET ORAL at 07:25

## 2022-10-07 RX ADMIN — Medication 1 DROP(S): at 06:15

## 2022-10-07 RX ADMIN — Medication 975 MILLIGRAM(S): at 21:28

## 2022-10-07 RX ADMIN — Medication 1 DROP(S): at 17:22

## 2022-10-07 RX ADMIN — Medication 975 MILLIGRAM(S): at 08:52

## 2022-10-07 RX ADMIN — Medication 4: at 20:07

## 2022-10-07 RX ADMIN — PANTOPRAZOLE SODIUM 40 MILLIGRAM(S): 20 TABLET, DELAYED RELEASE ORAL at 11:48

## 2022-10-07 RX ADMIN — OXYCODONE HYDROCHLORIDE 10 MILLIGRAM(S): 5 TABLET ORAL at 03:09

## 2022-10-07 RX ADMIN — OXYCODONE HYDROCHLORIDE 10 MILLIGRAM(S): 5 TABLET ORAL at 21:25

## 2022-10-07 RX ADMIN — Medication 2: at 12:40

## 2022-10-07 RX ADMIN — OXYCODONE HYDROCHLORIDE 10 MILLIGRAM(S): 5 TABLET ORAL at 22:00

## 2022-10-07 RX ADMIN — ENOXAPARIN SODIUM 40 MILLIGRAM(S): 100 INJECTION SUBCUTANEOUS at 11:43

## 2022-10-07 RX ADMIN — Medication 975 MILLIGRAM(S): at 03:12

## 2022-10-07 RX ADMIN — Medication 975 MILLIGRAM(S): at 22:00

## 2022-10-07 RX ADMIN — INSULIN GLARGINE 9 UNIT(S): 100 INJECTION, SOLUTION SUBCUTANEOUS at 21:29

## 2022-10-07 RX ADMIN — Medication 1 PACKET(S): at 06:15

## 2022-10-07 RX ADMIN — Medication 50 MILLIGRAM(S): at 07:29

## 2022-10-07 RX ADMIN — Medication 975 MILLIGRAM(S): at 14:42

## 2022-10-07 RX ADMIN — RISPERIDONE 2 MILLIGRAM(S): 4 TABLET ORAL at 21:29

## 2022-10-07 NOTE — PROGRESS NOTE ADULT - SUBJECTIVE AND OBJECTIVE BOX
POD # 16    Subjective: I had a good night. The pain is well controlled with oxycodone/acetaminophen    Objective:   Vital Signs Last 24 Hrs  T(C): 37.1 (07 Oct 2022 08:00), Max: 37.4 (06 Oct 2022 11:51)  T(F): 98.7 (07 Oct 2022 08:00), Max: 99.3 (06 Oct 2022 11:51)  HR: 95 (07 Oct 2022 08:00) (81 - 95)  BP: 143/45 (07 Oct 2022 08:00) (115/49 - 143/45)  BP(mean): --  RR: 16 (07 Oct 2022 08:00) (16 - 18)  SpO2: 95% (07 Oct 2022 08:00) (95% - 98%)    Parameters below as of 07 Oct 2022 08:00  Patient On (Oxygen Delivery Method): room air        Daily     Daily     Heent: N/C, AT PER no scleral icterus, No JVD  Pul: clear  Cor: RRR  Abdomen: soft, normal BS. Wound - clean - Drains stripped - output decreasing.  Extremities: without edema, motor/sensory intact    I&O's Detail    06 Oct 2022 07:01  -  07 Oct 2022 07:00  --------------------------------------------------------  IN:  Total IN: 0 mL    OUT:    Drain (mL): 50 mL    Drain (mL): 50 mL    Voided (mL): 1150 mL  Total OUT: 1250 mL    Total NET: -1250 mL      07 Oct 2022 07:01  -  07 Oct 2022 10:39  --------------------------------------------------------  IN:  Total IN: 0 mL    OUT:    Drain (mL): 10 mL    Drain (mL): 10 mL    Oral Fluid: 0 mL    Voided (mL): 0 mL  Total OUT: 20 mL    Total NET: -20 mL          MEDICATIONS  (STANDING):  acetaminophen     Tablet .. 975 milliGRAM(s) Oral every 6 hours  artificial  tears Solution 1 Drop(s) Both EYES two times a day  enoxaparin Injectable 40 milliGRAM(s) SubCutaneous every 24 hours  insulin glargine Injectable (LANTUS) 9 Unit(s) SubCutaneous at bedtime  insulin lispro (ADMELOG) corrective regimen sliding scale   SubCutaneous Before meals and at bedtime  metoprolol tartrate 50 milliGRAM(s) Oral daily  octreotide  Injectable 100 MICROGram(s) SubCutaneous daily  pantoprazole  Injectable 40 milliGRAM(s) IV Push every 24 hours  risperiDONE   Tablet 2 milliGRAM(s) Oral at bedtime    MEDICATIONS  (PRN):  oxyCODONE    IR 5 milliGRAM(s) Oral every 4 hours PRN Moderate Pain (4 - 6)  oxyCODONE    IR 10 milliGRAM(s) Oral every 4 hours PRN Severe Pain (7 - 10)  sodium chloride 0.9% lock flush 10 milliLiter(s) IV Push every 1 hour PRN Pre/post blood products, medications, blood draw, and to maintain line patency                            9.7    10.92 )-----------( 698      ( 07 Oct 2022 05:45 )             31.3     10-07    136  |  100  |  12  ----------------------------<  109<H>  4.1   |  28  |  0.60    Ca    8.0<L>      07 Oct 2022 05:45  Phos  2.2     10-07  Mg     2.00     10-07    TPro  5.2<L>  /  Alb  2.4<L>  /  TBili  0.3  /  DBili  x   /  AST  18  /  ALT  15  /  AlkPhos  255<H>  10-07        Radiologic Studies:

## 2022-10-07 NOTE — PROGRESS NOTE ADULT - SUBJECTIVE AND OBJECTIVE BOX
A Team (General Surgery) Daily Progress Note    Patient is a 72y old  Female who presents with a chief complaint of Pancreatic carcinoma (04 Oct 2022 14:50)      INTERVAL EVENTS:No acute events overnight. VSS. +/+    SUBJECTIVE: Patient seen and examined at bedside with surgical team, patient without complaints. Denies fever, chills, CP, SOB nausea, vomiting. Endorses some upper L-sided abdominal pain.    OBJECTIVE:  Vital Signs Last 24 Hrs  T(C): 36.9 (07 Oct 2022 06:00), Max: 37.4 (06 Oct 2022 11:51)  T(F): 98.5 (07 Oct 2022 06:00), Max: 99.3 (06 Oct 2022 11:51)  HR: 93 (07 Oct 2022 06:00) (81 - 93)  BP: 135/55 (07 Oct 2022 07:28) (115/49 - 139/42)  BP(mean): --  RR: 16 (07 Oct 2022 06:00) (16 - 18)  SpO2: 96% (07 Oct 2022 06:00) (96% - 98%)    Parameters below as of 07 Oct 2022 06:00  Patient On (Oxygen Delivery Method): room air        I&O's Detail    06 Oct 2022 07:01  -  07 Oct 2022 07:00  --------------------------------------------------------  IN:  Total IN: 0 mL    OUT:    Drain (mL): 50 mL    Drain (mL): 50 mL    Voided (mL): 1150 mL  Total OUT: 1250 mL    Total NET: -1250 mL        Exam:  Constitutional: A&Ox3, NAD  Respiratory: Unlabored breathing  Abdomen: Soft, distended in bilateral upper quadrants. Tender to palpation upper abdomen L > R without rebound or guarding. R MARGRET drain with serous output. L MARGRET drain with milky yellow output.   Extremities: WWP, VENEGAS spontaneously                        9.7    10.92 )-----------( 698      ( 07 Oct 2022 05:45 )             31.3       10-07    136  |  100  |  12  ----------------------------<  109<H>  4.1   |  28  |  0.60    Ca    8.0<L>      07 Oct 2022 05:45  Phos  2.2     10-07  Mg     2.00     10-07    TPro  5.2<L>  /  Alb  2.4<L>  /  TBili  0.3  /  DBili  x   /  AST  18  /  ALT  15  /  AlkPhos  255<H>  10-07          ASSESSMENT:  Patient is a 72 year old female with a PMHx of DM2, anxiety, depression and HTN who presented to pre-surgical testing with diagnosis of pancreatic cancer.  Patient is now S/P ex-lap and Whipple on 9/23/22.      PLAN:  - Clear liquid diet  - Octreotide 100mcg QD  - f/u outpatient octreotide and insurance coverage  - Suppository  - CT AP w PO/IV cont today  - Monitor MARGRET drain amylase daily: draw R drain first, then wait for result before ordering L drain amylase  - Out of bed  - Pain control  - VTE prophylaxis with Lovenox subcutaneous  - dispo planning    Patient will not require chemotherapy or radiation while at rehab.      A Team Surgery  pager: [phone]

## 2022-10-08 LAB
ALBUMIN SERPL ELPH-MCNC: 2.1 G/DL — LOW (ref 3.3–5)
ALP SERPL-CCNC: 275 U/L — HIGH (ref 40–120)
ALT FLD-CCNC: 18 U/L — SIGNIFICANT CHANGE UP (ref 4–33)
AMYLASE FLD-CCNC: 42 U/L — SIGNIFICANT CHANGE UP
AMYLASE FLD-CCNC: 689 U/L — SIGNIFICANT CHANGE UP
ANION GAP SERPL CALC-SCNC: 8 MMOL/L — SIGNIFICANT CHANGE UP (ref 7–14)
AST SERPL-CCNC: 26 U/L — SIGNIFICANT CHANGE UP (ref 4–32)
BILIRUB SERPL-MCNC: 0.4 MG/DL — SIGNIFICANT CHANGE UP (ref 0.2–1.2)
BUN SERPL-MCNC: 11 MG/DL — SIGNIFICANT CHANGE UP (ref 7–23)
CALCIUM SERPL-MCNC: 7.7 MG/DL — LOW (ref 8.4–10.5)
CHLORIDE SERPL-SCNC: 99 MMOL/L — SIGNIFICANT CHANGE UP (ref 98–107)
CO2 SERPL-SCNC: 27 MMOL/L — SIGNIFICANT CHANGE UP (ref 22–31)
CREAT SERPL-MCNC: 0.59 MG/DL — SIGNIFICANT CHANGE UP (ref 0.5–1.3)
EGFR: 96 ML/MIN/1.73M2 — SIGNIFICANT CHANGE UP
GLUCOSE BLDC GLUCOMTR-MCNC: 106 MG/DL — HIGH (ref 70–99)
GLUCOSE BLDC GLUCOMTR-MCNC: 120 MG/DL — HIGH (ref 70–99)
GLUCOSE BLDC GLUCOMTR-MCNC: 143 MG/DL — HIGH (ref 70–99)
GLUCOSE BLDC GLUCOMTR-MCNC: 200 MG/DL — HIGH (ref 70–99)
GLUCOSE SERPL-MCNC: 109 MG/DL — HIGH (ref 70–99)
HCT VFR BLD CALC: 28.5 % — LOW (ref 34.5–45)
HGB BLD-MCNC: 9.1 G/DL — LOW (ref 11.5–15.5)
MAGNESIUM SERPL-MCNC: 1.9 MG/DL — SIGNIFICANT CHANGE UP (ref 1.6–2.6)
MCHC RBC-ENTMCNC: 28.9 PG — SIGNIFICANT CHANGE UP (ref 27–34)
MCHC RBC-ENTMCNC: 31.9 GM/DL — LOW (ref 32–36)
MCV RBC AUTO: 90.5 FL — SIGNIFICANT CHANGE UP (ref 80–100)
NRBC # BLD: 0 /100 WBCS — SIGNIFICANT CHANGE UP (ref 0–0)
NRBC # FLD: 0 K/UL — SIGNIFICANT CHANGE UP (ref 0–0)
PHOSPHATE SERPL-MCNC: 2.1 MG/DL — LOW (ref 2.5–4.5)
PLATELET # BLD AUTO: 730 K/UL — HIGH (ref 150–400)
POTASSIUM SERPL-MCNC: 4.5 MMOL/L — SIGNIFICANT CHANGE UP (ref 3.5–5.3)
POTASSIUM SERPL-SCNC: 4.5 MMOL/L — SIGNIFICANT CHANGE UP (ref 3.5–5.3)
PROT SERPL-MCNC: 5.2 G/DL — LOW (ref 6–8.3)
RBC # BLD: 3.15 M/UL — LOW (ref 3.8–5.2)
RBC # FLD: 13.2 % — SIGNIFICANT CHANGE UP (ref 10.3–14.5)
SODIUM SERPL-SCNC: 134 MMOL/L — LOW (ref 135–145)
WBC # BLD: 11.79 K/UL — HIGH (ref 3.8–10.5)
WBC # FLD AUTO: 11.79 K/UL — HIGH (ref 3.8–10.5)

## 2022-10-08 RX ORDER — MAGNESIUM SULFATE 500 MG/ML
2 VIAL (ML) INJECTION ONCE
Refills: 0 | Status: DISCONTINUED | OUTPATIENT
Start: 2022-10-08 | End: 2022-10-08

## 2022-10-08 RX ORDER — MAGNESIUM SULFATE 500 MG/ML
1 VIAL (ML) INJECTION ONCE
Refills: 0 | Status: COMPLETED | OUTPATIENT
Start: 2022-10-08 | End: 2022-10-08

## 2022-10-08 RX ADMIN — Medication 975 MILLIGRAM(S): at 10:47

## 2022-10-08 RX ADMIN — Medication 975 MILLIGRAM(S): at 17:07

## 2022-10-08 RX ADMIN — Medication 975 MILLIGRAM(S): at 11:48

## 2022-10-08 RX ADMIN — OXYCODONE HYDROCHLORIDE 10 MILLIGRAM(S): 5 TABLET ORAL at 11:48

## 2022-10-08 RX ADMIN — OXYCODONE HYDROCHLORIDE 10 MILLIGRAM(S): 5 TABLET ORAL at 02:37

## 2022-10-08 RX ADMIN — OXYCODONE HYDROCHLORIDE 10 MILLIGRAM(S): 5 TABLET ORAL at 03:10

## 2022-10-08 RX ADMIN — RISPERIDONE 2 MILLIGRAM(S): 4 TABLET ORAL at 22:34

## 2022-10-08 RX ADMIN — Medication 2: at 18:17

## 2022-10-08 RX ADMIN — Medication 975 MILLIGRAM(S): at 22:34

## 2022-10-08 RX ADMIN — OCTREOTIDE ACETATE 100 MICROGRAM(S): 200 INJECTION, SOLUTION INTRAVENOUS; SUBCUTANEOUS at 17:06

## 2022-10-08 RX ADMIN — Medication 85 MILLIMOLE(S): at 13:09

## 2022-10-08 RX ADMIN — ENOXAPARIN SODIUM 40 MILLIGRAM(S): 100 INJECTION SUBCUTANEOUS at 10:48

## 2022-10-08 RX ADMIN — PANTOPRAZOLE SODIUM 40 MILLIGRAM(S): 20 TABLET, DELAYED RELEASE ORAL at 10:48

## 2022-10-08 RX ADMIN — OXYCODONE HYDROCHLORIDE 10 MILLIGRAM(S): 5 TABLET ORAL at 10:48

## 2022-10-08 RX ADMIN — Medication 1 DROP(S): at 17:06

## 2022-10-08 RX ADMIN — Medication 1 DROP(S): at 05:29

## 2022-10-08 RX ADMIN — Medication 50 MILLIGRAM(S): at 05:30

## 2022-10-08 RX ADMIN — Medication 975 MILLIGRAM(S): at 23:00

## 2022-10-08 RX ADMIN — Medication 975 MILLIGRAM(S): at 02:30

## 2022-10-08 RX ADMIN — Medication 100 GRAM(S): at 13:09

## 2022-10-08 RX ADMIN — INSULIN GLARGINE 9 UNIT(S): 100 INJECTION, SOLUTION SUBCUTANEOUS at 22:32

## 2022-10-08 RX ADMIN — Medication 975 MILLIGRAM(S): at 02:45

## 2022-10-08 NOTE — PROGRESS NOTE ADULT - SUBJECTIVE AND OBJECTIVE BOX
Surgery Progress Note     Patient is a 72y old  Female who presents with a chief complaint of Pancreatic carcinoma (07 Oct 2022 10:39)      HPI:  This is a 71 y/o female whose native language is Cypriot Creole; comprehends and verbalizes minimal English. Refused hospital ; requested sister Mariaa function as . Patient   presents with diagnosis of pancreatic cancer on endoscopy biopsy. Subsequent CT scan and PET scan confirm pathology. Scheduled for whipple procedure pancreatic cancer (20 Sep 2022 07:10)      PAST MEDICAL & SURGICAL HISTORY:  Language barrier  native language Cypriot Creole; comprehends and verbzlizes minimal English      Type 2 diabetes mellitus      Anxiety and depression      Hypertension      Pancreatic cancer  September 2022      Uterine fibroid  hysterectomyu 1988      H/O jaundice  biliary stent in place          Physical Exam:    Vital Signs Last 24 Hrs  T(C): 37.3 (08 Oct 2022 09:00), Max: 37.3 (08 Oct 2022 09:00)  T(F): 99.1 (08 Oct 2022 09:00), Max: 99.1 (08 Oct 2022 09:00)  HR: 79 (08 Oct 2022 09:00) (79 - 91)  BP: 114/46 (08 Oct 2022 09:00) (114/46 - 135/51)  BP(mean): --  RR: 15 (08 Oct 2022 09:00) (15 - 18)  SpO2: 96% (08 Oct 2022 09:00) (96% - 100%)    Parameters below as of 08 Oct 2022 09:00  Patient On (Oxygen Delivery Method): room air        General appearance:  Sitting comfortably in the chair . Tolerating liquids po  .    Pain controlled with Tylenol.        Drainage    Labs:                          9.1    11.79 )-----------( 730      ( 08 Oct 2022 08:31 )             28.5     10-08    134<L>  |  99  |  11  ----------------------------<  109<H>  4.5   |  27  |  0.59    Ca    7.7<L>      08 Oct 2022 08:31  Phos  2.1     10-08  Mg     1.90     10-08    TPro  5.2<L>  /  Alb  2.1<L>  /  TBili  0.4  /  DBili  x   /  AST  26  /  ALT  18  /  AlkPhos  275<H>  10-08          Assessment and Plan:

## 2022-10-09 LAB
ALBUMIN SERPL ELPH-MCNC: 2.1 G/DL — LOW (ref 3.3–5)
ALP SERPL-CCNC: 310 U/L — HIGH (ref 40–120)
ALT FLD-CCNC: 22 U/L — SIGNIFICANT CHANGE UP (ref 4–33)
AMYLASE FLD-CCNC: 45 U/L — SIGNIFICANT CHANGE UP
ANION GAP SERPL CALC-SCNC: 10 MMOL/L — SIGNIFICANT CHANGE UP (ref 7–14)
AST SERPL-CCNC: 27 U/L — SIGNIFICANT CHANGE UP (ref 4–32)
BILIRUB SERPL-MCNC: 0.3 MG/DL — SIGNIFICANT CHANGE UP (ref 0.2–1.2)
BUN SERPL-MCNC: 11 MG/DL — SIGNIFICANT CHANGE UP (ref 7–23)
CALCIUM SERPL-MCNC: 8 MG/DL — LOW (ref 8.4–10.5)
CHLORIDE SERPL-SCNC: 100 MMOL/L — SIGNIFICANT CHANGE UP (ref 98–107)
CO2 SERPL-SCNC: 27 MMOL/L — SIGNIFICANT CHANGE UP (ref 22–31)
CREAT SERPL-MCNC: 0.61 MG/DL — SIGNIFICANT CHANGE UP (ref 0.5–1.3)
EGFR: 95 ML/MIN/1.73M2 — SIGNIFICANT CHANGE UP
GLUCOSE BLDC GLUCOMTR-MCNC: 133 MG/DL — HIGH (ref 70–99)
GLUCOSE BLDC GLUCOMTR-MCNC: 255 MG/DL — HIGH (ref 70–99)
GLUCOSE BLDC GLUCOMTR-MCNC: 63 MG/DL — LOW (ref 70–99)
GLUCOSE BLDC GLUCOMTR-MCNC: 69 MG/DL — LOW (ref 70–99)
GLUCOSE BLDC GLUCOMTR-MCNC: 70 MG/DL — SIGNIFICANT CHANGE UP (ref 70–99)
GLUCOSE BLDC GLUCOMTR-MCNC: 91 MG/DL — SIGNIFICANT CHANGE UP (ref 70–99)
GLUCOSE BLDC GLUCOMTR-MCNC: 94 MG/DL — SIGNIFICANT CHANGE UP (ref 70–99)
GLUCOSE SERPL-MCNC: 86 MG/DL — SIGNIFICANT CHANGE UP (ref 70–99)
HCT VFR BLD CALC: 29.1 % — LOW (ref 34.5–45)
HGB BLD-MCNC: 9.2 G/DL — LOW (ref 11.5–15.5)
MCHC RBC-ENTMCNC: 28.8 PG — SIGNIFICANT CHANGE UP (ref 27–34)
MCHC RBC-ENTMCNC: 31.6 GM/DL — LOW (ref 32–36)
MCV RBC AUTO: 90.9 FL — SIGNIFICANT CHANGE UP (ref 80–100)
NRBC # BLD: 0 /100 WBCS — SIGNIFICANT CHANGE UP (ref 0–0)
NRBC # FLD: 0 K/UL — SIGNIFICANT CHANGE UP (ref 0–0)
PLATELET # BLD AUTO: 767 K/UL — HIGH (ref 150–400)
POTASSIUM SERPL-MCNC: 3.9 MMOL/L — SIGNIFICANT CHANGE UP (ref 3.5–5.3)
POTASSIUM SERPL-SCNC: 3.9 MMOL/L — SIGNIFICANT CHANGE UP (ref 3.5–5.3)
PROT SERPL-MCNC: 5 G/DL — LOW (ref 6–8.3)
RBC # BLD: 3.2 M/UL — LOW (ref 3.8–5.2)
RBC # FLD: 13.2 % — SIGNIFICANT CHANGE UP (ref 10.3–14.5)
SODIUM SERPL-SCNC: 137 MMOL/L — SIGNIFICANT CHANGE UP (ref 135–145)
WBC # BLD: 10.4 K/UL — SIGNIFICANT CHANGE UP (ref 3.8–10.5)
WBC # FLD AUTO: 10.4 K/UL — SIGNIFICANT CHANGE UP (ref 3.8–10.5)

## 2022-10-09 RX ADMIN — Medication 975 MILLIGRAM(S): at 06:00

## 2022-10-09 RX ADMIN — OXYCODONE HYDROCHLORIDE 5 MILLIGRAM(S): 5 TABLET ORAL at 05:38

## 2022-10-09 RX ADMIN — Medication 975 MILLIGRAM(S): at 18:20

## 2022-10-09 RX ADMIN — Medication 975 MILLIGRAM(S): at 18:27

## 2022-10-09 RX ADMIN — Medication 975 MILLIGRAM(S): at 05:39

## 2022-10-09 RX ADMIN — Medication 1 DROP(S): at 05:38

## 2022-10-09 RX ADMIN — Medication 975 MILLIGRAM(S): at 23:21

## 2022-10-09 RX ADMIN — Medication 975 MILLIGRAM(S): at 12:27

## 2022-10-09 RX ADMIN — RISPERIDONE 2 MILLIGRAM(S): 4 TABLET ORAL at 21:31

## 2022-10-09 RX ADMIN — OCTREOTIDE ACETATE 100 MICROGRAM(S): 200 INJECTION, SOLUTION INTRAVENOUS; SUBCUTANEOUS at 18:20

## 2022-10-09 RX ADMIN — Medication 1 DROP(S): at 18:20

## 2022-10-09 RX ADMIN — INSULIN GLARGINE 9 UNIT(S): 100 INJECTION, SOLUTION SUBCUTANEOUS at 21:45

## 2022-10-09 RX ADMIN — Medication 975 MILLIGRAM(S): at 13:13

## 2022-10-09 RX ADMIN — ENOXAPARIN SODIUM 40 MILLIGRAM(S): 100 INJECTION SUBCUTANEOUS at 13:13

## 2022-10-09 RX ADMIN — Medication 50 MILLIGRAM(S): at 05:40

## 2022-10-09 RX ADMIN — Medication 6: at 18:21

## 2022-10-09 RX ADMIN — PANTOPRAZOLE SODIUM 40 MILLIGRAM(S): 20 TABLET, DELAYED RELEASE ORAL at 09:31

## 2022-10-09 RX ADMIN — OXYCODONE HYDROCHLORIDE 5 MILLIGRAM(S): 5 TABLET ORAL at 06:00

## 2022-10-09 NOTE — PROGRESS NOTE ADULT - SUBJECTIVE AND OBJECTIVE BOX
Surgery Progress Note    SUBJECTIVE: Pt seen and examined at bedside. Patient comfortable and in no-apparent distress. No nausea, vomiting, diarrhea. Pain is controlled. +Gas/+BM. Tolerating diet.    Vital Signs Last 24 Hrs  T(C): 36.7 (09 Oct 2022 05:16), Max: 37 (09 Oct 2022 01:54)  T(F): 98 (09 Oct 2022 05:16), Max: 98.6 (09 Oct 2022 01:54)  HR: 87 (09 Oct 2022 05:16) (70 - 87)  BP: 136/50 (09 Oct 2022 05:16) (119/41 - 137/45)  BP(mean): --  RR: 18 (09 Oct 2022 05:16) (18 - 18)  SpO2: 95% (09 Oct 2022 05:16) (95% - 99%)    Parameters below as of 09 Oct 2022 05:16  Patient On (Oxygen Delivery Method): room air        Physical Exam:  General Appearance: Appears well, NAD  Respiratory: No labored breathing  CV: Pulse regularly present  Abdomen: Soft, nontender, ***incision c/d/i    LABS:                        9.2    10.40 )-----------( 767      ( 09 Oct 2022 08:45 )             29.1     10-09    137  |  100  |  11  ----------------------------<  86  3.9   |  27  |  0.61    Ca    8.0<L>      09 Oct 2022 08:45  Phos  2.1     10-08  Mg     1.90     10-08    TPro  5.0<L>  /  Alb  2.1<L>  /  TBili  0.3  /  DBili  x   /  AST  27  /  ALT  22  /  AlkPhos  310<H>  10-09          INs and OUTs:    10-08-22 @ 07:01  -  10-09-22 @ 07:00  --------------------------------------------------------  IN: 600 mL / OUT: 1107.5 mL / NET: -507.5 mL     Surgery Progress Note    SUBJECTIVE: Pt seen and examined at bedside. Patient comfortable and in no-apparent distress. No nausea, vomiting, diarrhea. Pain is controlled. +Gas/+BM. Tolerating diet.    Vital Signs Last 24 Hrs  T(C): 36.7 (09 Oct 2022 05:16), Max: 37 (09 Oct 2022 01:54)  T(F): 98 (09 Oct 2022 05:16), Max: 98.6 (09 Oct 2022 01:54)  HR: 87 (09 Oct 2022 05:16) (70 - 87)  BP: 136/50 (09 Oct 2022 05:16) (119/41 - 137/45)  BP(mean): --  RR: 18 (09 Oct 2022 05:16) (18 - 18)  SpO2: 95% (09 Oct 2022 05:16) (95% - 99%)    Parameters below as of 09 Oct 2022 05:16  Patient On (Oxygen Delivery Method): room air        Physical Exam:  General Appearance: Appears well, NAD  Respiratory: No labored breathing  CV: Pulse regularly present  Abdomen: Soft, nontender, nondistended, incision and MARGRET drain insertion sites c/d/i.     LABS:                        9.2    10.40 )-----------( 767      ( 09 Oct 2022 08:45 )             29.1     10-09    137  |  100  |  11  ----------------------------<  86  3.9   |  27  |  0.61    Ca    8.0<L>      09 Oct 2022 08:45  Phos  2.1     10-08  Mg     1.90     10-08    TPro  5.0<L>  /  Alb  2.1<L>  /  TBili  0.3  /  DBili  x   /  AST  27  /  ALT  22  /  AlkPhos  310<H>  10-09          INs and OUTs:    10-08-22 @ 07:01  -  10-09-22 @ 07:00  --------------------------------------------------------  IN: 600 mL / OUT: 1107.5 mL / NET: -507.5 mL

## 2022-10-09 NOTE — PROGRESS NOTE ADULT - ASSESSMENT
Patient is a 72 year old female with a PMHx of DM2, anxiety, depression and HTN who presented to pre-surgical testing with diagnosis of pancreatic cancer.  Patient is now S/P ex-lap and Whipple on 9/23/22.      PLAN:  - LRD  - Octreotide 100mcg QD  - F/U oupatient octreotide  - Monitor MARGRET drain amylase daily   - Out of bed  - Pain control  - VTE prophylaxis with Lovenox subcutaneous  - dispo planning    Patient will not require chemotherapy or radiation while at rehab.       #[id number]  A Team Surgery

## 2022-10-09 NOTE — PROGRESS NOTE ADULT - SUBJECTIVE AND OBJECTIVE BOX
Surgery Progress Note     Patient is a 72y old  Female who presents with a chief complaint of Whipple (09 Oct 2022 10:00)      HPI:  This is a 71 y/o female whose native language is Amharic Creole; comprehends and verbalizes minimal English. Refused hospital ; requested sister Mariaa function as . Patient   presents with diagnosis of pancreatic cancer on endoscopy biopsy. Subsequent CT scan and PET scan confirm pathology. Scheduled for whipple procedure pancreatic cancer (20 Sep 2022 07:10)      PAST MEDICAL & SURGICAL HISTORY:  Language barrier  native language Amharic Creole; comprehends and verbzlizes minimal English      Type 2 diabetes mellitus      Anxiety and depression      Hypertension      Pancreatic cancer  September 2022      Uterine fibroid  hysterectomyu 1988      H/O jaundice  biliary stent in place          Physical Exam:    Vital Signs Last 24 Hrs  T(C): 36.8 (09 Oct 2022 16:39), Max: 37 (09 Oct 2022 01:54)  T(F): 98.2 (09 Oct 2022 16:39), Max: 98.6 (09 Oct 2022 01:54)  HR: 87 (09 Oct 2022 16:39) (70 - 92)  BP: 147/41 (09 Oct 2022 16:39) (119/41 - 156/52)  BP(mean): --  RR: 18 (09 Oct 2022 16:39) (16 - 18)  SpO2: 98% (09 Oct 2022 16:39) (95% - 99%)    Parameters below as of 09 Oct 2022 16:39  Patient On (Oxygen Delivery Method): room air        General appearance:  Comfortable. No c/o. Tolerating diet        Drainage    Labs:                          9.2    10.40 )-----------( 767      ( 09 Oct 2022 08:45 )             29.1     10-09    137  |  100  |  11  ----------------------------<  86  3.9   |  27  |  0.61    Ca    8.0<L>      09 Oct 2022 08:45  Phos  2.1     10-08  Mg     1.90     10-08    TPro  5.0<L>  /  Alb  2.1<L>  /  TBili  0.3  /  DBili  x   /  AST  27  /  ALT  22  /  AlkPhos  310<H>  10-09          Assessment and Plan:

## 2022-10-09 NOTE — PROVIDER CONTACT NOTE (OTHER) - SITUATION
Pt. blood glucose 70. Pt. finger stick taken and blood glucose 63. Pt given apple juice x1, retaken 15 minutes later and blood glucose 70. Pt. finger stick taken and blood glucose 69. Pt given apple juice x2, retaken 15 minutes later and blood glucose 70.

## 2022-10-10 LAB
ALBUMIN SERPL ELPH-MCNC: 2.1 G/DL — LOW (ref 3.3–5)
ALP SERPL-CCNC: 342 U/L — HIGH (ref 40–120)
ALT FLD-CCNC: 32 U/L — SIGNIFICANT CHANGE UP (ref 4–33)
AMYLASE FLD-CCNC: 123 U/L — SIGNIFICANT CHANGE UP
ANION GAP SERPL CALC-SCNC: 10 MMOL/L — SIGNIFICANT CHANGE UP (ref 7–14)
AST SERPL-CCNC: 41 U/L — HIGH (ref 4–32)
BILIRUB SERPL-MCNC: 0.3 MG/DL — SIGNIFICANT CHANGE UP (ref 0.2–1.2)
BUN SERPL-MCNC: 11 MG/DL — SIGNIFICANT CHANGE UP (ref 7–23)
CALCIUM SERPL-MCNC: 8 MG/DL — LOW (ref 8.4–10.5)
CHLORIDE SERPL-SCNC: 102 MMOL/L — SIGNIFICANT CHANGE UP (ref 98–107)
CO2 SERPL-SCNC: 28 MMOL/L — SIGNIFICANT CHANGE UP (ref 22–31)
CREAT SERPL-MCNC: 0.59 MG/DL — SIGNIFICANT CHANGE UP (ref 0.5–1.3)
EGFR: 96 ML/MIN/1.73M2 — SIGNIFICANT CHANGE UP
GLUCOSE BLDC GLUCOMTR-MCNC: 119 MG/DL — HIGH (ref 70–99)
GLUCOSE BLDC GLUCOMTR-MCNC: 120 MG/DL — HIGH (ref 70–99)
GLUCOSE BLDC GLUCOMTR-MCNC: 132 MG/DL — HIGH (ref 70–99)
GLUCOSE BLDC GLUCOMTR-MCNC: 133 MG/DL — HIGH (ref 70–99)
GLUCOSE BLDC GLUCOMTR-MCNC: 152 MG/DL — HIGH (ref 70–99)
GLUCOSE SERPL-MCNC: 113 MG/DL — HIGH (ref 70–99)
HCT VFR BLD CALC: 27.7 % — LOW (ref 34.5–45)
HGB BLD-MCNC: 8.8 G/DL — LOW (ref 11.5–15.5)
MCHC RBC-ENTMCNC: 28.4 PG — SIGNIFICANT CHANGE UP (ref 27–34)
MCHC RBC-ENTMCNC: 31.8 GM/DL — LOW (ref 32–36)
MCV RBC AUTO: 89.4 FL — SIGNIFICANT CHANGE UP (ref 80–100)
NRBC # BLD: 0 /100 WBCS — SIGNIFICANT CHANGE UP (ref 0–0)
NRBC # FLD: 0 K/UL — SIGNIFICANT CHANGE UP (ref 0–0)
PLATELET # BLD AUTO: 683 K/UL — HIGH (ref 150–400)
POTASSIUM SERPL-MCNC: 3.9 MMOL/L — SIGNIFICANT CHANGE UP (ref 3.5–5.3)
POTASSIUM SERPL-SCNC: 3.9 MMOL/L — SIGNIFICANT CHANGE UP (ref 3.5–5.3)
PROT SERPL-MCNC: 4.8 G/DL — LOW (ref 6–8.3)
RBC # BLD: 3.1 M/UL — LOW (ref 3.8–5.2)
RBC # FLD: 13.2 % — SIGNIFICANT CHANGE UP (ref 10.3–14.5)
SODIUM SERPL-SCNC: 140 MMOL/L — SIGNIFICANT CHANGE UP (ref 135–145)
WBC # BLD: 9.26 K/UL — SIGNIFICANT CHANGE UP (ref 3.8–10.5)
WBC # FLD AUTO: 9.26 K/UL — SIGNIFICANT CHANGE UP (ref 3.8–10.5)

## 2022-10-10 RX ADMIN — Medication 975 MILLIGRAM(S): at 18:33

## 2022-10-10 RX ADMIN — INSULIN GLARGINE 9 UNIT(S): 100 INJECTION, SOLUTION SUBCUTANEOUS at 22:14

## 2022-10-10 RX ADMIN — OCTREOTIDE ACETATE 100 MICROGRAM(S): 200 INJECTION, SOLUTION INTRAVENOUS; SUBCUTANEOUS at 18:35

## 2022-10-10 RX ADMIN — Medication 975 MILLIGRAM(S): at 22:09

## 2022-10-10 RX ADMIN — Medication 975 MILLIGRAM(S): at 11:55

## 2022-10-10 RX ADMIN — Medication 975 MILLIGRAM(S): at 22:39

## 2022-10-10 RX ADMIN — RISPERIDONE 2 MILLIGRAM(S): 4 TABLET ORAL at 22:09

## 2022-10-10 RX ADMIN — OXYCODONE HYDROCHLORIDE 10 MILLIGRAM(S): 5 TABLET ORAL at 13:15

## 2022-10-10 RX ADMIN — ENOXAPARIN SODIUM 40 MILLIGRAM(S): 100 INJECTION SUBCUTANEOUS at 12:45

## 2022-10-10 RX ADMIN — OXYCODONE HYDROCHLORIDE 10 MILLIGRAM(S): 5 TABLET ORAL at 23:48

## 2022-10-10 RX ADMIN — PANTOPRAZOLE SODIUM 40 MILLIGRAM(S): 20 TABLET, DELAYED RELEASE ORAL at 10:55

## 2022-10-10 RX ADMIN — Medication 50 MILLIGRAM(S): at 06:16

## 2022-10-10 RX ADMIN — OXYCODONE HYDROCHLORIDE 10 MILLIGRAM(S): 5 TABLET ORAL at 12:45

## 2022-10-10 RX ADMIN — Medication 975 MILLIGRAM(S): at 06:16

## 2022-10-10 RX ADMIN — Medication 975 MILLIGRAM(S): at 10:54

## 2022-10-10 RX ADMIN — Medication 975 MILLIGRAM(S): at 19:15

## 2022-10-10 RX ADMIN — Medication 1 DROP(S): at 18:34

## 2022-10-10 RX ADMIN — Medication 1 DROP(S): at 06:16

## 2022-10-10 RX ADMIN — Medication 2: at 12:45

## 2022-10-10 NOTE — PROGRESS NOTE ADULT - ASSESSMENT
Patient is a 72 year old female with a PMHx of DM2, anxiety, depression and HTN who presented to pre-surgical testing with diagnosis of pancreatic cancer.  Patient is now S/P ex-lap and Whipple on 9/23/22.      PLAN:  - Bariatric phase 3 diet  - Monitor MARGRET drain output  - Monitor MARGRET drain amylase daily  - Out of bed  - Pain control  - VTE prophylaxis with Lovenox subcutaneous      #[id number]  A Team Surgery

## 2022-10-10 NOTE — PROGRESS NOTE ADULT - SUBJECTIVE AND OBJECTIVE BOX
Surgery Daily Progress Note  =====================================================  Interval / Overnight Events: Right MARGRET drain fell out.      HPI:  Patient is a 72 year old female with a PMHx of DM2, anxiety, depression and HTN who presented to pre-surgical testing with diagnosis of pancreatic cancer. (20 Sep 2022 07:10)      PAST MEDICAL & SURGICAL HISTORY:  Language barrier  native language Mohawk Creole; comprehends and verbzlizes minimal English  Type 2 diabetes mellitus  Anxiety and depression  Hypertension  Pancreatic cancer  September 2022  Uterine fibroid  hysterectomy 1988  H/O jaundice  biliary stent in place      ALLERGIES:  penicillin (Other)    --------------------------------------------------------------------------------------    MEDICATIONS:    Neurologic Medications  acetaminophen     Tablet .. 975 milliGRAM(s) Oral every 6 hours  oxyCODONE    IR 5 milliGRAM(s) Oral every 4 hours PRN Moderate Pain (4 - 6)  oxyCODONE    IR 10 milliGRAM(s) Oral every 4 hours PRN Severe Pain (7 - 10)  risperiDONE   Tablet 2 milliGRAM(s) Oral at bedtime    Cardiovascular Medications  metoprolol tartrate 50 milliGRAM(s) Oral daily    Gastrointestinal Medications  pantoprazole  Injectable 40 milliGRAM(s) IV Push every 24 hours  sodium chloride 0.9% lock flush 10 milliLiter(s) IV Push every 1 hour PRN Pre/post blood products, medications, blood draw, and to maintain line patency    Hematologic/Oncologic Medications  enoxaparin Injectable 40 milliGRAM(s) SubCutaneous every 24 hours    Endocrine/Metabolic Medications  insulin glargine Injectable (LANTUS) 9 Unit(s) SubCutaneous at bedtime  insulin lispro (ADMELOG) corrective regimen sliding scale   SubCutaneous Before meals and at bedtime  octreotide  Injectable 100 MICROGram(s) SubCutaneous daily    Topical/Other Medications  artificial  tears Solution 1 Drop(s) Both EYES two times a day    --------------------------------------------------------------------------------------    VITAL SIGNS:  T(C): 36.9 (10 Oct 2022 06:15), Max: 37.1 (10 Oct 2022 01:52)  T(F): 98.5 (10 Oct 2022 06:15), Max: 98.7 (10 Oct 2022 01:52)  HR: 98 (10 Oct 2022 06:15) (86 - 98)  BP: 138/45 (10 Oct 2022 06:15) (126/54 - 156/52)  RR: 19 (10 Oct 2022 06:15) (16 - 19)  SpO2: 97% (10 Oct 2022 06:15) (96% - 100%)    --------------------------------------------------------------------------------------    INS AND OUTS:    09 Oct 2022 07:01  -  10 Oct 2022 07:00  --------------------------------------------------------  IN:    Oral Fluid: 910 mL  Total IN: 910 mL    OUT:    Drain (mL): 20 mL    Voided (mL): 1750 mL  Total OUT: 1770 mL    Total NET: -860 mL    --------------------------------------------------------------------------------------    EXAM    NEUROLOGY  Exam: Normal, in no acute distress.    HEENT  Exam: Normocephalic, atraumatic.    RESPIRATORY  Exam: Normal expansion / effort.    CARDIOVASCULAR  Exam: S1, S2.  Regular rate and rhythm.    GI/NUTRITION  Exam: Abdomen soft, Non-tender, Non-distended.  Midline incision with staples clean, dry and intact.  MARGRET drain x1 with serosanguinous output.  Current Diet: Bariatric phase 3 diet    MUSCULOSKELETAL  Exam: All extremities moving spontaneously without limitations.      HEMATOLOGIC  [x] VTE Prophylaxis: enoxaparin Injectable 40 milliGRAM(s) SubCutaneous every 24 hours    --------------------------------------------------------------------------------------

## 2022-10-10 NOTE — PROGRESS NOTE ADULT - SUBJECTIVE AND OBJECTIVE BOX
Surgery Progress Note     Patient is a 72y old  Female who presents with a chief complaint of Whipple (09 Oct 2022 10:00)      HPI:  This is a 71 y/o female whose native language is Estonian Creole; comprehends and verbalizes minimal English. Refused hospital ; requested sister Mariaa function as . Patient   presents with diagnosis of pancreatic cancer on endoscopy biopsy. Subsequent CT scan and PET scan confirm pathology. Scheduled for whipple procedure pancreatic cancer (20 Sep 2022 07:10)      PAST MEDICAL & SURGICAL HISTORY:  Language barrier  native language Estonian Creole; comprehends and verbzlizes minimal English      Type 2 diabetes mellitus      Anxiety and depression      Hypertension      Pancreatic cancer  September 2022      Uterine fibroid  hysterectomyu 1988      H/O jaundice  biliary stent in place          Physical Exam:    Vital Signs Last 24 Hrs  T(C): 37.3 (10 Oct 2022 12:00), Max: 37.3 (10 Oct 2022 12:00)  T(F): 99.1 (10 Oct 2022 12:00), Max: 99.1 (10 Oct 2022 12:00)  HR: 88 (10 Oct 2022 12:00) (86 - 98)  BP: 119/47 (10 Oct 2022 12:00) (119/47 - 147/41)  BP(mean): --  RR: 18 (10 Oct 2022 12:00) (16 - 19)  SpO2: 99% (10 Oct 2022 12:00) (97% - 100%)    Parameters below as of 10 Oct 2022 12:00  Patient On (Oxygen Delivery Method): room air        General appearance:  Comfortable. Afebrile.        Drainage   25 cc left. / rt dislodged.    Labs:                          8.8    9.26  )-----------( 683      ( 10 Oct 2022 05:42 )             27.7     10-10    140  |  102  |  11  ----------------------------<  113<H>  3.9   |  28  |  0.59    Ca    8.0<L>      10 Oct 2022 05:42    TPro  4.8<L>  /  Alb  2.1<L>  /  TBili  0.3  /  DBili  x   /  AST  41<H>  /  ALT  32  /  AlkPhos  342<H>  10-10          Assessment and Plan:

## 2022-10-11 LAB
AMYLASE FLD-CCNC: 190 U/L — SIGNIFICANT CHANGE UP
ANION GAP SERPL CALC-SCNC: 12 MMOL/L — SIGNIFICANT CHANGE UP (ref 7–14)
BUN SERPL-MCNC: 11 MG/DL — SIGNIFICANT CHANGE UP (ref 7–23)
CALCIUM SERPL-MCNC: 8.7 MG/DL — SIGNIFICANT CHANGE UP (ref 8.4–10.5)
CHLORIDE SERPL-SCNC: 101 MMOL/L — SIGNIFICANT CHANGE UP (ref 98–107)
CO2 SERPL-SCNC: 26 MMOL/L — SIGNIFICANT CHANGE UP (ref 22–31)
CREAT SERPL-MCNC: 0.68 MG/DL — SIGNIFICANT CHANGE UP (ref 0.5–1.3)
EGFR: 92 ML/MIN/1.73M2 — SIGNIFICANT CHANGE UP
GLUCOSE BLDC GLUCOMTR-MCNC: 123 MG/DL — HIGH (ref 70–99)
GLUCOSE BLDC GLUCOMTR-MCNC: 136 MG/DL — HIGH (ref 70–99)
GLUCOSE BLDC GLUCOMTR-MCNC: 145 MG/DL — HIGH (ref 70–99)
GLUCOSE BLDC GLUCOMTR-MCNC: 164 MG/DL — HIGH (ref 70–99)
GLUCOSE BLDC GLUCOMTR-MCNC: 70 MG/DL — SIGNIFICANT CHANGE UP (ref 70–99)
GLUCOSE BLDC GLUCOMTR-MCNC: 75 MG/DL — SIGNIFICANT CHANGE UP (ref 70–99)
GLUCOSE SERPL-MCNC: 51 MG/DL — CRITICAL LOW (ref 70–99)
HCT VFR BLD CALC: 30.2 % — LOW (ref 34.5–45)
HGB BLD-MCNC: 9.5 G/DL — LOW (ref 11.5–15.5)
MAGNESIUM SERPL-MCNC: 2 MG/DL — SIGNIFICANT CHANGE UP (ref 1.6–2.6)
MCHC RBC-ENTMCNC: 28.3 PG — SIGNIFICANT CHANGE UP (ref 27–34)
MCHC RBC-ENTMCNC: 31.5 GM/DL — LOW (ref 32–36)
MCV RBC AUTO: 89.9 FL — SIGNIFICANT CHANGE UP (ref 80–100)
NRBC # BLD: 0 /100 WBCS — SIGNIFICANT CHANGE UP (ref 0–0)
NRBC # FLD: 0 K/UL — SIGNIFICANT CHANGE UP (ref 0–0)
PHOSPHATE SERPL-MCNC: 3.3 MG/DL — SIGNIFICANT CHANGE UP (ref 2.5–4.5)
PLATELET # BLD AUTO: 734 K/UL — HIGH (ref 150–400)
POTASSIUM SERPL-MCNC: 4.6 MMOL/L — SIGNIFICANT CHANGE UP (ref 3.5–5.3)
POTASSIUM SERPL-SCNC: 4.6 MMOL/L — SIGNIFICANT CHANGE UP (ref 3.5–5.3)
RBC # BLD: 3.36 M/UL — LOW (ref 3.8–5.2)
RBC # FLD: 13.4 % — SIGNIFICANT CHANGE UP (ref 10.3–14.5)
SODIUM SERPL-SCNC: 139 MMOL/L — SIGNIFICANT CHANGE UP (ref 135–145)
WBC # BLD: 12.86 K/UL — HIGH (ref 3.8–10.5)
WBC # FLD AUTO: 12.86 K/UL — HIGH (ref 3.8–10.5)

## 2022-10-11 RX ORDER — METRONIDAZOLE 500 MG
500 TABLET ORAL ONCE
Refills: 0 | Status: COMPLETED | OUTPATIENT
Start: 2022-10-11 | End: 2022-10-11

## 2022-10-11 RX ORDER — CIPROFLOXACIN LACTATE 400MG/40ML
400 VIAL (ML) INTRAVENOUS EVERY 12 HOURS
Refills: 0 | Status: DISCONTINUED | OUTPATIENT
Start: 2022-10-11 | End: 2022-10-13

## 2022-10-11 RX ADMIN — Medication 1 DROP(S): at 07:30

## 2022-10-11 RX ADMIN — Medication 200 MILLIGRAM(S): at 15:20

## 2022-10-11 RX ADMIN — OXYCODONE HYDROCHLORIDE 10 MILLIGRAM(S): 5 TABLET ORAL at 21:29

## 2022-10-11 RX ADMIN — ENOXAPARIN SODIUM 40 MILLIGRAM(S): 100 INJECTION SUBCUTANEOUS at 13:19

## 2022-10-11 RX ADMIN — Medication 975 MILLIGRAM(S): at 08:00

## 2022-10-11 RX ADMIN — INSULIN GLARGINE 9 UNIT(S): 100 INJECTION, SOLUTION SUBCUTANEOUS at 22:11

## 2022-10-11 RX ADMIN — RISPERIDONE 2 MILLIGRAM(S): 4 TABLET ORAL at 21:29

## 2022-10-11 RX ADMIN — OCTREOTIDE ACETATE 100 MICROGRAM(S): 200 INJECTION, SOLUTION INTRAVENOUS; SUBCUTANEOUS at 18:14

## 2022-10-11 RX ADMIN — OXYCODONE HYDROCHLORIDE 10 MILLIGRAM(S): 5 TABLET ORAL at 22:00

## 2022-10-11 RX ADMIN — Medication 975 MILLIGRAM(S): at 07:29

## 2022-10-11 RX ADMIN — Medication 50 MILLIGRAM(S): at 07:30

## 2022-10-11 RX ADMIN — Medication 975 MILLIGRAM(S): at 18:14

## 2022-10-11 RX ADMIN — Medication 975 MILLIGRAM(S): at 13:49

## 2022-10-11 RX ADMIN — Medication 975 MILLIGRAM(S): at 19:14

## 2022-10-11 RX ADMIN — Medication 2: at 22:10

## 2022-10-11 RX ADMIN — Medication 975 MILLIGRAM(S): at 13:19

## 2022-10-11 RX ADMIN — Medication 100 MILLIGRAM(S): at 15:20

## 2022-10-11 RX ADMIN — Medication 1 DROP(S): at 18:13

## 2022-10-11 RX ADMIN — PANTOPRAZOLE SODIUM 40 MILLIGRAM(S): 20 TABLET, DELAYED RELEASE ORAL at 15:20

## 2022-10-11 NOTE — PROGRESS NOTE ADULT - ASSESSMENT
S/P Whipple procedure  Probable Pus draining from left sided Rod and from the site of the accidentally extracted right sided drain.

## 2022-10-11 NOTE — PROVIDER CONTACT NOTE (HYPOGLYCEMIA EVENT) - NS PROVIDER CONTACT RECOMMEND-HYPO
Orange Juice and Apple juice provided for patient. Breakfast should be on the floor within half an hour. Patient is asymptomatic. Retake FS in 1 hour.

## 2022-10-11 NOTE — PROGRESS NOTE ADULT - ASSESSMENT
Patient is a 72 year old female with a PMHx of DM2, anxiety, depression and HTN who presented to pre-surgical testing with diagnosis of pancreatic cancer.  Patient is now S/P ex-lap and Whipple on 9/23/22.      PLAN:  - Bariatric phase 3 diet  - Monitor MARGRET drain output  - Monitor MARGRET drain amylase daily  - Out of bed  - Pain control  - VTE prophylaxis with Lovenox subcutaneous      #[id number]  A Team Surgery

## 2022-10-11 NOTE — PROGRESS NOTE ADULT - SUBJECTIVE AND OBJECTIVE BOX
Subjective: I feel OK.  I am eating and I moved my bowels.    Objective:   Vital Signs Last 24 Hrs  T(C): 37.3 (11 Oct 2022 12:00), Max: 37.3 (11 Oct 2022 12:00)  T(F): 99.2 (11 Oct 2022 12:00), Max: 99.2 (11 Oct 2022 12:00)  HR: 96 (11 Oct 2022 12:00) (83 - 112)  BP: 112/49 (11 Oct 2022 12:00) (112/49 - 145/54)  BP(mean): --  RR: 17 (11 Oct 2022 12:00) (16 - 18)  SpO2: 100% (11 Oct 2022 12:00) (97% - 100%)    Parameters below as of 11 Oct 2022 12:00  Patient On (Oxygen Delivery Method): room air        Daily     Daily     Heent: N/C, AT PER no scleral icterus, No JVD  Pul: clear  Cor: RRR  Abdomen: soft, non distended, normal BS. Wound - clean but purulent discharge from drain sites- left drain was stripped.  Extremities: without edema, motor/sensory intact    I&O's Detail    10 Oct 2022 07:01  -  11 Oct 2022 07:00  --------------------------------------------------------  IN:  Total IN: 0 mL    OUT:    Drain (mL): 40 mL    Voided (mL): 600 mL  Total OUT: 640 mL    Total NET: -640 mL      11 Oct 2022 07:01  -  11 Oct 2022 13:17  --------------------------------------------------------  IN:  Total IN: 0 mL    OUT:    Drain (mL): 10 mL    Voided (mL): 300 mL  Total OUT: 310 mL    Total NET: -310 mL          MEDICATIONS  (STANDING):  acetaminophen     Tablet .. 975 milliGRAM(s) Oral every 6 hours  artificial  tears Solution 1 Drop(s) Both EYES two times a day  enoxaparin Injectable 40 milliGRAM(s) SubCutaneous every 24 hours  insulin glargine Injectable (LANTUS) 9 Unit(s) SubCutaneous at bedtime  insulin lispro (ADMELOG) corrective regimen sliding scale   SubCutaneous Before meals and at bedtime  metoprolol tartrate 50 milliGRAM(s) Oral daily  octreotide  Injectable 100 MICROGram(s) SubCutaneous daily  pantoprazole  Injectable 40 milliGRAM(s) IV Push every 24 hours  risperiDONE   Tablet 2 milliGRAM(s) Oral at bedtime    MEDICATIONS  (PRN):  oxyCODONE    IR 10 milliGRAM(s) Oral every 4 hours PRN Severe Pain (7 - 10)  oxyCODONE    IR 5 milliGRAM(s) Oral every 4 hours PRN Moderate Pain (4 - 6)  sodium chloride 0.9% lock flush 10 milliLiter(s) IV Push every 1 hour PRN Pre/post blood products, medications, blood draw, and to maintain line patency                            9.5    12.86 )-----------( 734      ( 11 Oct 2022 06:11 )             30.2     10-11    139  |  101  |  11  ----------------------------<  51<LL>  4.6   |  26  |  0.68    Ca    8.7      11 Oct 2022 06:11  Phos  3.3     10-11  Mg     2.00     10-11    TPro  4.8<L>  /  Alb  2.1<L>  /  TBili  0.3  /  DBili  x   /  AST  41<H>  /  ALT  32  /  AlkPhos  342<H>  10-10        Radiologic Studies:

## 2022-10-11 NOTE — PROVIDER CONTACT NOTE (HYPOGLYCEMIA EVENT) - NS PROVIDER CONTACT BACKGROUND-HYPO
Age: 72y    Gender: Female    Critical Value: Serum Glucose of 51.       POCT Blood Glucose:  70 mg/dL (10-11-22 @ 08:36)  75 mg/dL (10-11-22 @ 08:18)  132 mg/dL (10-10-22 @ 21:43)  120 mg/dL (10-10-22 @ 17:59)  152 mg/dL (10-10-22 @ 11:59)  133 mg/dL (10-10-22 @ 08:53)      eMAR:  insulin glargine Injectable (LANTUS)   9 Unit(s) SubCutaneous (10-10-22 @ 22:14)    insulin lispro (ADMELOG) corrective regimen sliding scale   2 Unit(s) SubCutaneous (10-10-22 @ 12:45)    octreotide  Injectable   100 MICROGram(s) SubCutaneous (10-10-22 @ 18:35)

## 2022-10-12 LAB
AMYLASE FLD-CCNC: 56 U/L — SIGNIFICANT CHANGE UP
ANION GAP SERPL CALC-SCNC: 9 MMOL/L — SIGNIFICANT CHANGE UP (ref 7–14)
BUN SERPL-MCNC: 13 MG/DL — SIGNIFICANT CHANGE UP (ref 7–23)
CALCIUM SERPL-MCNC: 8 MG/DL — LOW (ref 8.4–10.5)
CHLORIDE SERPL-SCNC: 100 MMOL/L — SIGNIFICANT CHANGE UP (ref 98–107)
CO2 SERPL-SCNC: 28 MMOL/L — SIGNIFICANT CHANGE UP (ref 22–31)
CREAT SERPL-MCNC: 0.63 MG/DL — SIGNIFICANT CHANGE UP (ref 0.5–1.3)
EGFR: 94 ML/MIN/1.73M2 — SIGNIFICANT CHANGE UP
GLUCOSE BLDC GLUCOMTR-MCNC: 137 MG/DL — HIGH (ref 70–99)
GLUCOSE BLDC GLUCOMTR-MCNC: 153 MG/DL — HIGH (ref 70–99)
GLUCOSE BLDC GLUCOMTR-MCNC: 165 MG/DL — HIGH (ref 70–99)
GLUCOSE BLDC GLUCOMTR-MCNC: 172 MG/DL — HIGH (ref 70–99)
GLUCOSE SERPL-MCNC: 148 MG/DL — HIGH (ref 70–99)
GRAM STN FLD: SIGNIFICANT CHANGE UP
HCT VFR BLD CALC: 26.9 % — LOW (ref 34.5–45)
HGB BLD-MCNC: 8.6 G/DL — LOW (ref 11.5–15.5)
MAGNESIUM SERPL-MCNC: 1.8 MG/DL — SIGNIFICANT CHANGE UP (ref 1.6–2.6)
MCHC RBC-ENTMCNC: 28.7 PG — SIGNIFICANT CHANGE UP (ref 27–34)
MCHC RBC-ENTMCNC: 32 GM/DL — SIGNIFICANT CHANGE UP (ref 32–36)
MCV RBC AUTO: 89.7 FL — SIGNIFICANT CHANGE UP (ref 80–100)
NRBC # BLD: 0 /100 WBCS — SIGNIFICANT CHANGE UP (ref 0–0)
NRBC # FLD: 0 K/UL — SIGNIFICANT CHANGE UP (ref 0–0)
PHOSPHATE SERPL-MCNC: 3.2 MG/DL — SIGNIFICANT CHANGE UP (ref 2.5–4.5)
PLATELET # BLD AUTO: 573 K/UL — HIGH (ref 150–400)
POTASSIUM SERPL-MCNC: 4.2 MMOL/L — SIGNIFICANT CHANGE UP (ref 3.5–5.3)
POTASSIUM SERPL-SCNC: 4.2 MMOL/L — SIGNIFICANT CHANGE UP (ref 3.5–5.3)
RBC # BLD: 3 M/UL — LOW (ref 3.8–5.2)
RBC # FLD: 13.4 % — SIGNIFICANT CHANGE UP (ref 10.3–14.5)
SARS-COV-2 RNA SPEC QL NAA+PROBE: SIGNIFICANT CHANGE UP
SODIUM SERPL-SCNC: 137 MMOL/L — SIGNIFICANT CHANGE UP (ref 135–145)
SPECIMEN SOURCE: SIGNIFICANT CHANGE UP
WBC # BLD: 10.76 K/UL — HIGH (ref 3.8–10.5)
WBC # FLD AUTO: 10.76 K/UL — HIGH (ref 3.8–10.5)

## 2022-10-12 PROCEDURE — 74177 CT ABD & PELVIS W/CONTRAST: CPT | Mod: 26

## 2022-10-12 RX ORDER — METRONIDAZOLE 500 MG
500 TABLET ORAL EVERY 8 HOURS
Refills: 0 | Status: DISCONTINUED | OUTPATIENT
Start: 2022-10-12 | End: 2022-10-13

## 2022-10-12 RX ADMIN — Medication 1 DROP(S): at 06:27

## 2022-10-12 RX ADMIN — Medication 975 MILLIGRAM(S): at 12:26

## 2022-10-12 RX ADMIN — Medication 200 MILLIGRAM(S): at 06:40

## 2022-10-12 RX ADMIN — ENOXAPARIN SODIUM 40 MILLIGRAM(S): 100 INJECTION SUBCUTANEOUS at 12:25

## 2022-10-12 RX ADMIN — Medication 100 MILLIGRAM(S): at 06:40

## 2022-10-12 RX ADMIN — Medication 2: at 21:56

## 2022-10-12 RX ADMIN — Medication 50 MILLIGRAM(S): at 06:40

## 2022-10-12 RX ADMIN — Medication 200 MILLIGRAM(S): at 17:23

## 2022-10-12 RX ADMIN — RISPERIDONE 2 MILLIGRAM(S): 4 TABLET ORAL at 21:59

## 2022-10-12 RX ADMIN — Medication 2: at 18:23

## 2022-10-12 RX ADMIN — INSULIN GLARGINE 9 UNIT(S): 100 INJECTION, SOLUTION SUBCUTANEOUS at 21:55

## 2022-10-12 RX ADMIN — Medication 2: at 08:39

## 2022-10-12 RX ADMIN — Medication 975 MILLIGRAM(S): at 07:00

## 2022-10-12 RX ADMIN — Medication 975 MILLIGRAM(S): at 06:29

## 2022-10-12 RX ADMIN — OCTREOTIDE ACETATE 100 MICROGRAM(S): 200 INJECTION, SOLUTION INTRAVENOUS; SUBCUTANEOUS at 15:58

## 2022-10-12 RX ADMIN — Medication 975 MILLIGRAM(S): at 17:22

## 2022-10-12 RX ADMIN — PANTOPRAZOLE SODIUM 40 MILLIGRAM(S): 20 TABLET, DELAYED RELEASE ORAL at 12:26

## 2022-10-12 RX ADMIN — Medication 1 DROP(S): at 15:58

## 2022-10-12 RX ADMIN — Medication 100 MILLIGRAM(S): at 15:57

## 2022-10-12 RX ADMIN — Medication 100 MILLIGRAM(S): at 22:01

## 2022-10-12 NOTE — PROGRESS NOTE ADULT - ASSESSMENT
Patient is a 72 year old female with a PMHx of DM2, anxiety, depression and HTN who presented to pre-surgical testing with diagnosis of pancreatic cancer.  Patient is now S/P ex-lap and Whipple on 9/23/22.      PLAN:  - f/u 10am CT A/P  - f/u drain cx  - Bariatric phase 3 diet  - Monitor MARGRET drain output  - Monitor MARGRET drain amylase daily  - Out of bed  - Pain control  - VTE prophylaxis with Lovenox subcutaneous      #[id number]  A Team Surgery

## 2022-10-12 NOTE — PROGRESS NOTE ADULT - SUBJECTIVE AND OBJECTIVE BOX
Surgery Progress Note      SUBJECTIVE: Patient seen and examined at bedside with surgical team. No acute events overnight. Patient denies pain, nausea, vomiting. +flatus/+BM.    OBJECTIVE:    Vital Signs Last 24 Hrs  T(C): 36.9 (12 Oct 2022 08:25), Max: 37.5 (11 Oct 2022 21:24)  T(F): 98.5 (12 Oct 2022 08:25), Max: 99.5 (11 Oct 2022 21:24)  HR: 92 (12 Oct 2022 08:25) (86 - 112)  BP: 147/54 (12 Oct 2022 08:25) (112/49 - 155/55)  BP(mean): --  RR: 18 (12 Oct 2022 08:25) (16 - 18)  SpO2: 95% (12 Oct 2022 08:25) (95% - 100%)    Parameters below as of 12 Oct 2022 08:25  Patient On (Oxygen Delivery Method): room air    I&O's Detail    11 Oct 2022 07:01  -  12 Oct 2022 07:00  --------------------------------------------------------  IN:    Oral Fluid: 500 mL  Total IN: 500 mL    OUT:    Drain (mL): 10 mL    Voided (mL): 300 mL  Total OUT: 310 mL    Total NET: 190 mL      MEDICATIONS  (STANDING):  acetaminophen     Tablet .. 975 milliGRAM(s) Oral every 6 hours  artificial  tears Solution 1 Drop(s) Both EYES two times a day  ciprofloxacin   IVPB 400 milliGRAM(s) IV Intermittent every 12 hours  enoxaparin Injectable 40 milliGRAM(s) SubCutaneous every 24 hours  insulin glargine Injectable (LANTUS) 9 Unit(s) SubCutaneous at bedtime  insulin lispro (ADMELOG) corrective regimen sliding scale   SubCutaneous Before meals and at bedtime  metoprolol tartrate 50 milliGRAM(s) Oral daily  metroNIDAZOLE  IVPB 500 milliGRAM(s) IV Intermittent every 8 hours  octreotide  Injectable 100 MICROGram(s) SubCutaneous daily  pantoprazole  Injectable 40 milliGRAM(s) IV Push every 24 hours  risperiDONE   Tablet 2 milliGRAM(s) Oral at bedtime    MEDICATIONS  (PRN):  oxyCODONE    IR 5 milliGRAM(s) Oral every 4 hours PRN Moderate Pain (4 - 6)  oxyCODONE    IR 10 milliGRAM(s) Oral every 4 hours PRN Severe Pain (7 - 10)  sodium chloride 0.9% lock flush 10 milliLiter(s) IV Push every 1 hour PRN Pre/post blood products, medications, blood draw, and to maintain line patency      PHYSICAL EXAM:  Constitutional: A&Ox3, NAD  Respiratory: Unlabored breathing  Abdomen: Soft, nondistended, nontender. L MARGRET drain w/ purulent fluid. Site of previous R MARGRET w/ expressed purulent drainage  Extremities: WWP, VENEGAS spontaneously    LABS:                        8.6    10.76 )-----------( 573      ( 12 Oct 2022 05:30 )             26.9     10-12    137  |  100  |  13  ----------------------------<  148<H>  4.2   |  28  |  0.63    Ca    8.0<L>      12 Oct 2022 05:30  Phos  3.2     10-12  Mg     1.80     10-12

## 2022-10-13 DIAGNOSIS — R18.8 OTHER ASCITES: ICD-10-CM

## 2022-10-13 DIAGNOSIS — A49.9 BACTERIAL INFECTION, UNSPECIFIED: ICD-10-CM

## 2022-10-13 DIAGNOSIS — Z88.0 ALLERGY STATUS TO PENICILLIN: ICD-10-CM

## 2022-10-13 LAB
AMYLASE FLD-CCNC: 32 U/L — SIGNIFICANT CHANGE UP
ANION GAP SERPL CALC-SCNC: 10 MMOL/L — SIGNIFICANT CHANGE UP (ref 7–14)
BUN SERPL-MCNC: 11 MG/DL — SIGNIFICANT CHANGE UP (ref 7–23)
CALCIUM SERPL-MCNC: 8.3 MG/DL — LOW (ref 8.4–10.5)
CHLORIDE SERPL-SCNC: 105 MMOL/L — SIGNIFICANT CHANGE UP (ref 98–107)
CO2 SERPL-SCNC: 24 MMOL/L — SIGNIFICANT CHANGE UP (ref 22–31)
CREAT SERPL-MCNC: 0.58 MG/DL — SIGNIFICANT CHANGE UP (ref 0.5–1.3)
EGFR: 96 ML/MIN/1.73M2 — SIGNIFICANT CHANGE UP
GLUCOSE BLDC GLUCOMTR-MCNC: 103 MG/DL — HIGH (ref 70–99)
GLUCOSE BLDC GLUCOMTR-MCNC: 124 MG/DL — HIGH (ref 70–99)
GLUCOSE BLDC GLUCOMTR-MCNC: 183 MG/DL — HIGH (ref 70–99)
GLUCOSE BLDC GLUCOMTR-MCNC: 210 MG/DL — HIGH (ref 70–99)
GLUCOSE SERPL-MCNC: 126 MG/DL — HIGH (ref 70–99)
HCT VFR BLD CALC: 29.2 % — LOW (ref 34.5–45)
HGB BLD-MCNC: 9.4 G/DL — LOW (ref 11.5–15.5)
MAGNESIUM SERPL-MCNC: 1.7 MG/DL — SIGNIFICANT CHANGE UP (ref 1.6–2.6)
MCHC RBC-ENTMCNC: 28.2 PG — SIGNIFICANT CHANGE UP (ref 27–34)
MCHC RBC-ENTMCNC: 32.2 GM/DL — SIGNIFICANT CHANGE UP (ref 32–36)
MCV RBC AUTO: 87.7 FL — SIGNIFICANT CHANGE UP (ref 80–100)
NRBC # BLD: 0 /100 WBCS — SIGNIFICANT CHANGE UP (ref 0–0)
NRBC # FLD: 0 K/UL — SIGNIFICANT CHANGE UP (ref 0–0)
PHOSPHATE SERPL-MCNC: 2.6 MG/DL — SIGNIFICANT CHANGE UP (ref 2.5–4.5)
PLATELET # BLD AUTO: 606 K/UL — HIGH (ref 150–400)
POTASSIUM SERPL-MCNC: 3.9 MMOL/L — SIGNIFICANT CHANGE UP (ref 3.5–5.3)
POTASSIUM SERPL-SCNC: 3.9 MMOL/L — SIGNIFICANT CHANGE UP (ref 3.5–5.3)
RBC # BLD: 3.33 M/UL — LOW (ref 3.8–5.2)
RBC # FLD: 13.1 % — SIGNIFICANT CHANGE UP (ref 10.3–14.5)
SODIUM SERPL-SCNC: 139 MMOL/L — SIGNIFICANT CHANGE UP (ref 135–145)
SURGICAL PATHOLOGY STUDY: SIGNIFICANT CHANGE UP
WBC # BLD: 10.72 K/UL — HIGH (ref 3.8–10.5)
WBC # FLD AUTO: 10.72 K/UL — HIGH (ref 3.8–10.5)

## 2022-10-13 PROCEDURE — 99222 1ST HOSP IP/OBS MODERATE 55: CPT

## 2022-10-13 RX ORDER — ONDANSETRON 8 MG/1
4 TABLET, FILM COATED ORAL ONCE
Refills: 0 | Status: COMPLETED | OUTPATIENT
Start: 2022-10-13 | End: 2022-10-13

## 2022-10-13 RX ORDER — MAGNESIUM SULFATE 500 MG/ML
2 VIAL (ML) INJECTION ONCE
Refills: 0 | Status: COMPLETED | OUTPATIENT
Start: 2022-10-13 | End: 2022-10-13

## 2022-10-13 RX ORDER — CEFTRIAXONE 500 MG/1
1000 INJECTION, POWDER, FOR SOLUTION INTRAMUSCULAR; INTRAVENOUS EVERY 24 HOURS
Refills: 0 | Status: COMPLETED | OUTPATIENT
Start: 2022-10-13 | End: 2022-10-15

## 2022-10-13 RX ORDER — METRONIDAZOLE 500 MG
500 TABLET ORAL EVERY 12 HOURS
Refills: 0 | Status: DISCONTINUED | OUTPATIENT
Start: 2022-10-14 | End: 2022-10-16

## 2022-10-13 RX ADMIN — OCTREOTIDE ACETATE 100 MICROGRAM(S): 200 INJECTION, SOLUTION INTRAVENOUS; SUBCUTANEOUS at 17:49

## 2022-10-13 RX ADMIN — Medication 975 MILLIGRAM(S): at 06:29

## 2022-10-13 RX ADMIN — Medication 2: at 12:44

## 2022-10-13 RX ADMIN — Medication 1 DROP(S): at 17:49

## 2022-10-13 RX ADMIN — ENOXAPARIN SODIUM 40 MILLIGRAM(S): 100 INJECTION SUBCUTANEOUS at 11:22

## 2022-10-13 RX ADMIN — Medication 4: at 17:48

## 2022-10-13 RX ADMIN — Medication 975 MILLIGRAM(S): at 23:59

## 2022-10-13 RX ADMIN — Medication 200 MILLIGRAM(S): at 06:31

## 2022-10-13 RX ADMIN — CEFTRIAXONE 100 MILLIGRAM(S): 500 INJECTION, POWDER, FOR SOLUTION INTRAMUSCULAR; INTRAVENOUS at 21:39

## 2022-10-13 RX ADMIN — Medication 100 MILLIGRAM(S): at 12:44

## 2022-10-13 RX ADMIN — INSULIN GLARGINE 9 UNIT(S): 100 INJECTION, SOLUTION SUBCUTANEOUS at 22:16

## 2022-10-13 RX ADMIN — RISPERIDONE 2 MILLIGRAM(S): 4 TABLET ORAL at 21:40

## 2022-10-13 RX ADMIN — ONDANSETRON 4 MILLIGRAM(S): 8 TABLET, FILM COATED ORAL at 22:40

## 2022-10-13 RX ADMIN — Medication 975 MILLIGRAM(S): at 12:45

## 2022-10-13 RX ADMIN — Medication 100 MILLIGRAM(S): at 05:32

## 2022-10-13 RX ADMIN — Medication 85 MILLIMOLE(S): at 12:43

## 2022-10-13 RX ADMIN — Medication 50 MILLIGRAM(S): at 05:30

## 2022-10-13 RX ADMIN — Medication 975 MILLIGRAM(S): at 07:30

## 2022-10-13 RX ADMIN — ONDANSETRON 4 MILLIGRAM(S): 8 TABLET, FILM COATED ORAL at 08:08

## 2022-10-13 RX ADMIN — Medication 975 MILLIGRAM(S): at 01:00

## 2022-10-13 RX ADMIN — Medication 1 DROP(S): at 05:29

## 2022-10-13 RX ADMIN — PANTOPRAZOLE SODIUM 40 MILLIGRAM(S): 20 TABLET, DELAYED RELEASE ORAL at 08:08

## 2022-10-13 RX ADMIN — Medication 975 MILLIGRAM(S): at 17:50

## 2022-10-13 RX ADMIN — Medication 975 MILLIGRAM(S): at 00:07

## 2022-10-13 RX ADMIN — Medication 25 GRAM(S): at 11:21

## 2022-10-13 NOTE — PROVIDER CONTACT NOTE (CRITICAL VALUE NOTIFICATION) - TEST AND RESULT REPORTED:
Positive for moderate polymorphonuclear leukocytes per low power field; numerous gram negative rods per oil power field, numerous gram positive cocci per oil power field

## 2022-10-13 NOTE — DISCHARGE NOTE PROVIDER - HOSPITAL COURSE
This patient is a 72 female with pancreatic cancer who presented to [facility] on 9/22/22 for Whipple procedure. Patient taken to OR by Dr. Bach and underwent Whipple. Taken to SICU intubated and sedated post-operatively with NGT in place and on phenylephrine for vasopressor support.   9/23 Patient unable to be extubated, failed spontaneous breathing trials due to tachypnea. Vasopressor support weaned. Febrile to 101.6.   9/24 Patient extubated successfully during the day, however overnight developed acute hypoxemia with O2 sat 70s-80s that did not improve with nonrebreather, BVM. Patient reintubated and CXR revealed left lung whiteout. Bedside bronchoscopy performed and revealed mucous plug left main bronchus. Copious suction and irrigation performed to clear airway. Distal bronchi patent and without any obstructing mucus. R Main bronchi clear and patent without evidence of distal secretions. Post-procedure CXR with improvement in L lung aeration.             9/28: Patient successfully extubated.    This patient is a 72 female with pancreatic cancer who presented to [facility] on 9/22/22 for Whipple procedure. Patient taken to OR by Dr. Bach and underwent Whipple. Taken to SICU intubated and sedated post-operatively with NGT in place and on phenylephrine for vasopressor support.   9/23 Patient unable to be extubated, failed spontaneous breathing trials due to tachypnea. Vasopressor support weaned. Febrile to 101.6.   9/24 Patient extubated successfully during the day, however overnight developed acute hypoxemia with O2 sat 70s-80s that did not improve with nonrebreather, BVM. Patient reintubated and CXR revealed left lung whiteout. Bedside bronchoscopy performed and revealed mucous plug left main bronchus. Copious suction and irrigation performed to clear airway. Distal bronchi patent and without any obstructing mucus. R Main bronchi clear and patent without evidence of distal secretions. Post-procedure CXR with improvement in L lung aeration.   9/28: Patient successfully extubated.  NGT dc'd.  9/30 CLD, drain amylase high, octreotide added to regimen. Patient transferred to the floor.  10/2 advanced to regular diet  Patient backed down to CLD for complaints of nausea.  10/12 culture from [facility] sent as consistency changed from serous to purulent.   10/13  ID consulted for cultures growing E.coli and strep anginosus in cx and PCN allergy. Recommendations appreciated. RRepeat imaing performed revealing improving cseroma vs abscesss.  10/14 NPO for nausea  10/15 Advanced to CLd  10/16 -10/20 advanced as tolerated to a regular diet. OOB/Ambulating. Duplex LE for LE edema negative for DVT s/p diuresis. This patient is a 72 female with pancreatic cancer who presented to [facility] on 9/22/22 for Whipple procedure. Patient taken to OR by Dr. Bach and underwent Whipple. Taken to SICU intubated and sedated post-operatively with NGT in place and on phenylephrine for vasopressor support.   9/23 Patient unable to be extubated, failed spontaneous breathing trials due to tachypnea. Vasopressor support weaned. Febrile to 101.6.   9/24 Patient extubated successfully during the day, however overnight developed acute hypoxemia with O2 sat 70s-80s that did not improve with nonrebreather, BVM. Patient reintubated and CXR revealed left lung whiteout. Bedside bronchoscopy performed and revealed mucous plug left main bronchus. Copious suction and irrigation performed to clear airway. Distal bronchi patent and without any obstructing mucus. R Main bronchi clear and patent without evidence of distal secretions. Post-procedure CXR with improvement in L lung aeration.   9/28: Patient successfully extubated.  NGT dc'd.  9/30 CLD, drain amylase high, octreotide added to regimen. Patient transferred to the floor.  10/2 advanced to regular diet  Patient backed down to CLD for complaints of nausea.  10/12 culture from [facility] sent as consistency changed from serous to purulent.   10/13  ID consulted for cultures growing E.coli and strep anginosus in cx and PCN allergy. Recommendations appreciated. RRepeat imaing performed revealing improving cseroma vs abscesss.  10/14 NPO for nausea  10/15 Advanced to CLd  10/16 -10/20 advanced as tolerated to a soft diet. OOB/Ambulating. Duplex LE for LE edema negative for DVT s/p diuresis  10/19 WBC downtrended to normal limits, left CARLIN removed. ID follow up recommended dc ertapenem, 7 days of keflex 500mg TID, flagyl 500mg BID This patient is a 72 female with pancreatic cancer who presented to [facility] on 9/22/22 for Whipple procedure. Patient taken to OR by Dr. Bach and underwent Whipple. Taken to SICU intubated and sedated post-operatively with NGT in place and on phenylephrine for vasopressor support.   9/23 Patient unable to be extubated, failed spontaneous breathing trials due to tachypnea. Vasopressor support weaned. Febrile to 101.6.   9/24 Patient extubated successfully during the day, however overnight developed acute hypoxemia with O2 sat 70s-80s that did not improve with nonrebreather, BVM. Patient reintubated and CXR revealed left lung whiteout. Bedside bronchoscopy performed and revealed mucous plug left main bronchus. Copious suction and irrigation performed to clear airway. Distal bronchi patent and without any obstructing mucus. R Main bronchi clear and patent without evidence of distal secretions. Post-procedure CXR with improvement in L lung aeration.   9/28: Patient successfully extubated.  NGT dc'd.  9/30 CLD, drain amylase high, octreotide added to regimen. Patient transferred to the floor.  10/2 advanced to regular diet, patient backed down to CLD for complaints of nausea.  10/12 culture from [facility] sent as consistency changed from serous to purulent.   10/13  ID consulted for cultures growing E.coli and strep anginosus in cx and PCN allergy. Recommendations appreciated. RRepeat imaing performed revealing improving cseroma vs abscesss.  10/14 NPO for nausea  10/15 Advanced to CLd  10/16 -10/20 advanced as tolerated to a soft diet. OOB/Ambulating. Duplex LE for LE edema negative for DVT s/p diuresis  10/19 WBC downtrended to normal limits, left CARLIN removed. ID follow up recommended discontinue ertapenem, 7 days of keflex 500mg TID, flagyl 500mg BID    On day of discharge, the patient was tolerating diet, ambulating well and pain controlled. The patient was instructed to follow up with Dr. Bach within 1-2weeks after discharge from hospital. The patient/family felt comfortable with discharge. The patient was discharged to home with outpatient PT. The patient had no other issues.

## 2022-10-13 NOTE — PROGRESS NOTE ADULT - SUBJECTIVE AND OBJECTIVE BOX
A Team (General Surgery) Daily Progress Note    SUBJECTIVE:  Pt seen and examined, and is resting comfortably in bed. No acute events overnight. Pain is adequately controlled on current regimen. Pt has no complaints at this time. Pt had two episodes of emesis (40cc) and improvement with Zofran. Pt reports nausea started after IV Cipro AM dosing.    OBJECTIVE:  Vital Signs Last 24 Hrs  T(C): 37 (13 Oct 2022 07:54), Max: 37 (12 Oct 2022 12:28)  T(F): 98.6 (13 Oct 2022 07:54), Max: 98.6 (12 Oct 2022 12:28)  HR: 106 (13 Oct 2022 07:54) (77 - 106)  BP: 140/57 (13 Oct 2022 07:54) (116/50 - 140/57)  BP(mean): --  RR: 18 (13 Oct 2022 07:54) (18 - 18)  SpO2: 98% (13 Oct 2022 07:54) (98% - 100%)    Parameters below as of 13 Oct 2022 07:54  Patient On (Oxygen Delivery Method): room air        I&O's Detail    12 Oct 2022 07:01  -  13 Oct 2022 07:00  --------------------------------------------------------  IN:  Total IN: 0 mL    OUT:    Drain (mL): 22 mL    Voided (mL): 600 mL  Total OUT: 622 mL    Total NET: -622 mL      13 Oct 2022 07:01  -  13 Oct 2022 09:22  --------------------------------------------------------  IN:  Total IN: 0 mL    OUT:    Drain (mL): 0 mL    Voided (mL): 300 mL  Total OUT: 300 mL    Total NET: -300 mL        Exam:  Constitutional: A&Ox3, NAD  Respiratory: Unlabored breathing  Abdomen: Soft, nondistended, nontender. L MARGRET drain w/ purulent fluid. Site of previous R MARGRET w/ expressed purulent drainage  Extremities: WWP, VENEGAS spontaneously                        9.4    10.72 )-----------( 606      ( 13 Oct 2022 08:03 )             29.2       10-13    139  |  105  |  11  ----------------------------<  126<H>  3.9   |  24  |  0.58    Ca    8.3<L>      13 Oct 2022 08:03  Phos  2.6     10-13  Mg     1.70     10-13            ASSESSMENT:  Patient is a 72 year old female with a PMHx of DM2, anxiety, depression and HTN who presented to pre-surgical testing with diagnosis of pancreatic cancer.  Patient is now S/P ex-lap and Whipple on 9/23/22.      PLAN:  - f/u drain cx  - Bariatric phase 3 diet  - Monitor MARGRET drain output  - Monitor MARGRET drain amylase daily  - Out of bed  - Pain control  - VTE prophylaxis with Lovenox subcutaneous    A Team Surgery  pager: [phone]

## 2022-10-13 NOTE — DISCHARGE NOTE PROVIDER - CARE PROVIDER_API CALL
Rena Bach)  Surgery  [address], [address]  [place] [address]  Phone: [phone]  Fax: [phone]  Follow Up Time: 2 weeks

## 2022-10-13 NOTE — DISCHARGE NOTE PROVIDER - DISCHARGE SERVICE FOR PATIENT
no chest pain/no palpitations/no peripheral edema on the discharge service for the patient. I have reviewed and made amendments to the documentation where necessary.

## 2022-10-13 NOTE — DISCHARGE NOTE PROVIDER - NSDCCPCAREPLAN_GEN_ALL_CORE_FT
PRINCIPAL DISCHARGE DIAGNOSIS  Diagnosis: Pancreatic cancer  Assessment and Plan of Treatment: WOUND CARE:  Please keep incisions clean and dry. Please do not Scrub or rub incisions. Do not use lotion or powder on incisions.   BATHING: You may shower and/or sponge bathe. You may use warm soapy water in the shower and rinse, pat dry.  ACTIVITY: No heavy lifting or straining. Otherwise, you may return to your usual level of physical activity. If you are taking narcotic pain medication DO NOT drive a car, operate machinery or make important decisions.  DIET: Return to your usual diet.  NOTIFY YOUR SURGEON IF YOU HAVE: any bleeding that does not stop, any pus draining from your wound(s), any fever (over 100.4 F) persistent nausea/vomiting, or if your pain is not controlled on your discharge pain medications, unable to urinate.  Please follow up with your primary care physician in one week regarding your hospitalization, bring copies of your discharge paperwork.  Please follow up with your surgeon, Dr. Bach within 2 weeks of discharge. Please call to schedule your appointment.       PRINCIPAL DISCHARGE DIAGNOSIS  Diagnosis: Pancreatic cancer  Assessment and Plan of Treatment: WOUND CARE:  Please keep incisions clean and dry. Please do not Scrub or rub incisions. Do not use lotion or powder on incisions.   BATHING: You may shower and/or sponge bathe. You may use warm soapy water in the shower and rinse, pat dry.  ACTIVITY: No heavy lifting or straining. Otherwise, you may return to your usual level of physical activity. If you are taking narcotic pain medication DO NOT drive a car, operate machinery or make important decisions.  DIET: Return to your usual diet.  MEDICATIONS: please take antibiotics - cephalexin and metronidazole - as directed for 9 more days, they were sent electronically to your pharmacy  NOTIFY YOUR SURGEON IF YOU HAVE: any bleeding that does not stop, any pus draining from your wound(s), any fever (over 100.4 F) persistent nausea/vomiting, or if your pain is not controlled on your discharge pain medications, unable to urinate.  1. Please follow up with your primary care physician in one week regarding your hospitalization, bring copies of your discharge paperwork.  2. Please follow up with your surgeon, Dr. Bach within 2 weeks of discharge. Please call to schedule your appointment.  3. Please schedule outpatient physical therapy when you feel up to it. You can pick a physical therapy place in your local community, please bring the physical prescription to your first appointment.

## 2022-10-13 NOTE — CONSULT NOTE ADULT - GASTROINTESTINAL
midline incsion clean with right side drain with purulent fluid/soft/nontender/nondistended details…

## 2022-10-13 NOTE — PROGRESS NOTE ADULT - SUBJECTIVE AND OBJECTIVE BOX
POD # 14      Subjective: My pain is well controlled. I had a regular diet and I moved my bowels.    Objective:   Vital Signs Last 24 Hrs  T(C): 36.9 (13 Oct 2022 11:35), Max: 37 (12 Oct 2022 16:39)  T(F): 98.4 (13 Oct 2022 11:35), Max: 98.6 (12 Oct 2022 16:39)  HR: 72 (13 Oct 2022 11:35) (72 - 106)  BP: 114/50 (13 Oct 2022 11:35) (114/50 - 140/57)  BP(mean): --  RR: 18 (13 Oct 2022 11:35) (18 - 18)  SpO2: 100% (13 Oct 2022 11:35) (98% - 100%)    Parameters below as of 13 Oct 2022 11:35  Patient On (Oxygen Delivery Method): room air        Daily     Daily Weight in k.2 (13 Oct 2022 05:25)    Heent: N/C, AT PER no scleral icterus, No JVD  Pul: clear  Cor: RRR  Abdomen: soft, normal BS. Wound - clean with staples in place - Left-sided Rod drain with minimal purulent discharge   Extremities: without edema, motor/sensory intact    I&O's Detail    12 Oct 2022 07:01  -  13 Oct 2022 07:00  --------------------------------------------------------  IN:  Total IN: 0 mL    OUT:    Drain (mL): 22 mL    Voided (mL): 600 mL  Total OUT: 622 mL    Total NET: -622 mL      13 Oct 2022 07:01  -  13 Oct 2022 13:32  --------------------------------------------------------  IN:  Total IN: 0 mL    OUT:    Drain (mL): 7 mL    Voided (mL): 600 mL  Total OUT: 607 mL    Total NET: -607 mL          MEDICATIONS  (STANDING):  acetaminophen     Tablet .. 975 milliGRAM(s) Oral every 6 hours  artificial  tears Solution 1 Drop(s) Both EYES two times a day  ciprofloxacin   IVPB 400 milliGRAM(s) IV Intermittent every 12 hours  enoxaparin Injectable 40 milliGRAM(s) SubCutaneous every 24 hours  insulin glargine Injectable (LANTUS) 9 Unit(s) SubCutaneous at bedtime  insulin lispro (ADMELOG) corrective regimen sliding scale   SubCutaneous Before meals and at bedtime  metoprolol tartrate 50 milliGRAM(s) Oral daily  metroNIDAZOLE  IVPB 500 milliGRAM(s) IV Intermittent every 8 hours  octreotide  Injectable 100 MICROGram(s) SubCutaneous daily  pantoprazole  Injectable 40 milliGRAM(s) IV Push every 24 hours  risperiDONE   Tablet 2 milliGRAM(s) Oral at bedtime    MEDICATIONS  (PRN):  oxyCODONE    IR 5 milliGRAM(s) Oral every 4 hours PRN Moderate Pain (4 - 6)  oxyCODONE    IR 10 milliGRAM(s) Oral every 4 hours PRN Severe Pain (7 - 10)  sodium chloride 0.9% lock flush 10 milliLiter(s) IV Push every 1 hour PRN Pre/post blood products, medications, blood draw, and to maintain line patency                            9.4    10.72 )-----------( 606      ( 13 Oct 2022 08:03 )             29.2     1013    139  |  105  |  11  ----------------------------<  126<H>  3.9   |  24  |  0.58    Ca    8.3<L>      13 Oct 2022 08:03  Phos  2.6     10-13  Mg     1.70     1013          Radiologic Studies:< from: CT Abdomen and Pelvis w/ Oral Cont and w/ IV Cont (10.12.22 @ 10:57) >  BOWEL: Status post Whipple's procedure with hepaticojejunostomy and   gastrojejunostomy. There is interval decreased distention of the   pancreatic or biliary lymph nodes since the prior CT. No evidence of   bowel obstruction. Appendix is normal.  PERITONEUM: A left anterior approach drainage catheter is present with   its tip in the left upper quadrant. Right-sided drainage catheter has   been removed. Multiple fluid collections are seen in the surgical bed   which appear to be in communication with each other and are overall   decrease in size since the prior study.  Pancreatic tail collection measuring 4.3 x 2.3 cm (4-43), previously 6.3   x 3.1 cm.  2.8 x 1.8 cm fluid collection inferior to the pancreatic body (4:44),   previously 4.4 x 3.4 cm.  Fluid collection adjacent to the pancreatic head measures 2.9 x 1.6 cm   (4-48), previously 4.8 x 2.1 cm.    < end of copied text >

## 2022-10-13 NOTE — CONSULT NOTE ADULT - PROBLEM SELECTOR RECOMMENDATION 9
-E.coli and strep anginosus noted in fluid cx  -dc cipro  -CTX 1 gm iv q24   -change flagyl 500 mg po bid  -abd exam benign   -no fever  -change to po abx once sensitivities are back

## 2022-10-13 NOTE — PROVIDER CONTACT NOTE (CRITICAL VALUE NOTIFICATION) - ACTION/TREATMENT ORDERED:
Provider stated patient is already on a course of two separate IV antibiotics. Provider will monitor and decide in the morning if any additional treatments are needed.

## 2022-10-13 NOTE — CONSULT NOTE ADULT - SUBJECTIVE AND OBJECTIVE BOX
RUBIN HO 72y Female  MRN-[id number]    Patient is a 72y old  Female who presents with a chief complaint of Pancreatic carcinoma (13 Oct 2022 13:31)      HPI:  This is a 71 y/o female whose native language is Bengali Creole; comprehends and verbalizes minimal English. Refused hospital ; requested sister Mariaa function as . Patient   presents with diagnosis of pancreatic cancer on endoscopy biopsy. Subsequent CT scan and PET scan confirm pathology. Scheduled for whipple procedure pancreatic cancer (20 Sep 2022 07:10)      PAST MEDICAL & SURGICAL HISTORY:  Language barrier  native language Bengali Creole; comprehends and verbzlizes minimal English      Type 2 diabetes mellitus      Anxiety and depression      Hypertension      Pancreatic cancer  September 2022      Uterine fibroid  hysterectomyu 1988      H/O jaundice  biliary stent in place          Allergies    penicillin (Other)    Intolerances        ANTIMICROBIALS:  ciprofloxacin   IVPB 400 every 12 hours  metroNIDAZOLE  IVPB 500 every 8 hours      MEDICATIONS  (STANDING):  acetaminophen     Tablet .. 975 milliGRAM(s) Oral every 6 hours  artificial  tears Solution 1 Drop(s) Both EYES two times a day  ciprofloxacin   IVPB 400 milliGRAM(s) IV Intermittent every 12 hours  enoxaparin Injectable 40 milliGRAM(s) SubCutaneous every 24 hours  insulin glargine Injectable (LANTUS) 9 Unit(s) SubCutaneous at bedtime  insulin lispro (ADMELOG) corrective regimen sliding scale   SubCutaneous Before meals and at bedtime  metoprolol tartrate 50 milliGRAM(s) Oral daily  metroNIDAZOLE  IVPB 500 milliGRAM(s) IV Intermittent every 8 hours  octreotide  Injectable 100 MICROGram(s) SubCutaneous daily  pantoprazole  Injectable 40 milliGRAM(s) IV Push every 24 hours  risperiDONE   Tablet 2 milliGRAM(s) Oral at bedtime      Social History  Smoking:  Etoh:  Drug use:      FAMILY HISTORY:  No pertinent family history in first degree relatives        Vital Signs Last 24 Hrs  T(C): 36.9 (13 Oct 2022 11:35), Max: 37 (12 Oct 2022 16:39)  T(F): 98.4 (13 Oct 2022 11:35), Max: 98.6 (12 Oct 2022 16:39)  HR: 72 (13 Oct 2022 11:35) (72 - 106)  BP: 114/50 (13 Oct 2022 11:35) (114/50 - 140/57)  BP(mean): --  RR: 18 (13 Oct 2022 11:35) (18 - 18)  SpO2: 100% (13 Oct 2022 11:35) (98% - 100%)    Parameters below as of 13 Oct 2022 11:35  Patient On (Oxygen Delivery Method): room air        CBC Full  -  ( 13 Oct 2022 08:03 )  WBC Count : 10.72 K/uL  RBC Count : 3.33 M/uL  Hemoglobin : 9.4 g/dL  Hematocrit : 29.2 %  Platelet Count - Automated : 606 K/uL  Mean Cell Volume : 87.7 fL  Mean Cell Hemoglobin : 28.2 pg  Mean Cell Hemoglobin Concentration : 32.2 gm/dL  Auto Neutrophil # : x  Auto Lymphocyte # : x  Auto Monocyte # : x  Auto Eosinophil # : x  Auto Basophil # : x  Auto Neutrophil % : x  Auto Lymphocyte % : x  Auto Monocyte % : x  Auto Eosinophil % : x  Auto Basophil % : x    10-13    139  |  105  |  11  ----------------------------<  126<H>  3.9   |  24  |  0.58    Ca    8.3<L>      13 Oct 2022 08:03  Phos  2.6     10-13  Mg     1.70     10-13            MICROBIOLOGY:  Abdominal Fl Abdominal Fluid  10-12-22   Moderate Escherichia coli  Moderate Streptococcus anginosus  --    Moderate polymorphonuclear leukocytes per low power field  Numerous Gram Negative Rods per oil power field  Numerous Gram Positive Cocci per oil power field      Abdominal Fl Abdominal Fluid  10-01-22   No growth at 5 days  --    polymorphonuclear leukocytes seen  No organisms seen  by cytocentrifuge      Abdominal Fl Abdominal Fluid  10-01-22   Rare Streptococcus anginosus "Susceptibilities not performed"  --    No polymorphonuclear cells seen  No organisms seen  by cytocentrifuge      .Blood Blood  09-29-22   No Growth Final  --  --      .Blood Blood  09-28-22   No Growth Final  --  --              v              RADIOLOGY   RUBIN HO 72y Female  [id number]    Patient is a 72y old  Female who presents with a chief complaint of Pancreatic carcinoma (13 Oct 2022 13:31)      HPI:  This is a 73 y/o female whose native language is Venezuelan Creole; comprehends and verbalizes minimal English. Refused hospital ; requested sister Mariaa function as . Patient   presents with diagnosis of pancreatic cancer on endoscopy biopsy. Subsequent CT scan and PET scan confirm pathology. Scheduled for whipple procedure pancreatic cancer (20 Sep 2022 07:10)    S/p whipple 9/22    No fever.    Noted with purulent discharge from abd drain. Put on cipro and flagyl by primary team     PAST MEDICAL & SURGICAL HISTORY:  Language barrier  native language Venezuelan Creole; comprehends and verbalizes minimal English      Type 2 diabetes mellitus      Anxiety and depression      Hypertension      Pancreatic cancer  September 2022      Uterine fibroid  hysterectomyu 1988      H/O jaundice  biliary stent in place          Allergies    penicillin (Other)    Intolerances        ANTIMICROBIALS:  ciprofloxacin   IVPB 400 every 12 hours  metroNIDAZOLE  IVPB 500 every 8 hours      MEDICATIONS  (STANDING):  acetaminophen     Tablet .. 975 milliGRAM(s) Oral every 6 hours  artificial  tears Solution 1 Drop(s) Both EYES two times a day  ciprofloxacin   IVPB 400 milliGRAM(s) IV Intermittent every 12 hours  enoxaparin Injectable 40 milliGRAM(s) SubCutaneous every 24 hours  insulin glargine Injectable (LANTUS) 9 Unit(s) SubCutaneous at bedtime  insulin lispro (ADMELOG) corrective regimen sliding scale   SubCutaneous Before meals and at bedtime  metoprolol tartrate 50 milliGRAM(s) Oral daily  metroNIDAZOLE  IVPB 500 milliGRAM(s) IV Intermittent every 8 hours  octreotide  Injectable 100 MICROGram(s) SubCutaneous daily  pantoprazole  Injectable 40 milliGRAM(s) IV Push every 24 hours  risperiDONE   Tablet 2 milliGRAM(s) Oral at bedtime      Social History  Smoking: no  Etoh: no  Drug use: no      FAMILY HISTORY:  No pertinent family history in first degree relatives  No h/o pancreatic CA        Vital Signs Last 24 Hrs  T(C): 36.9 (13 Oct 2022 11:35), Max: 37 (12 Oct 2022 16:39)  T(F): 98.4 (13 Oct 2022 11:35), Max: 98.6 (12 Oct 2022 16:39)  HR: 72 (13 Oct 2022 11:35) (72 - 106)  BP: 114/50 (13 Oct 2022 11:35) (114/50 - 140/57)  BP(mean): --  RR: 18 (13 Oct 2022 11:35) (18 - 18)  SpO2: 100% (13 Oct 2022 11:35) (98% - 100%)    Parameters below as of 13 Oct 2022 11:35  Patient On (Oxygen Delivery Method): room air        CBC Full  -  ( 13 Oct 2022 08:03 )  WBC Count : 10.72 K/uL  RBC Count : 3.33 M/uL  Hemoglobin : 9.4 g/dL  Hematocrit : 29.2 %  Platelet Count - Automated : 606 K/uL  Mean Cell Volume : 87.7 fL  Mean Cell Hemoglobin : 28.2 pg  Mean Cell Hemoglobin Concentration : 32.2 gm/dL  Auto Neutrophil # : x  Auto Lymphocyte # : x  Auto Monocyte # : x  Auto Eosinophil # : x  Auto Basophil # : x  Auto Neutrophil % : x  Auto Lymphocyte % : x  Auto Monocyte % : x  Auto Eosinophil % : x  Auto Basophil % : x    10-13    139  |  105  |  11  ----------------------------<  126<H>  3.9   |  24  |  0.58    Ca    8.3<L>      13 Oct 2022 08:03  Phos  2.6     10-13  Mg     1.70     10-13            MICROBIOLOGY:  Abdominal Fl Abdominal Fluid  10-12-22   Moderate Escherichia coli  Moderate Streptococcus anginosus  --    Moderate polymorphonuclear leukocytes per low power field  Numerous Gram Negative Rods per oil power field  Numerous Gram Positive Cocci per oil power field      Abdominal Fl Abdominal Fluid  10-01-22   No growth at 5 days  --    polymorphonuclear leukocytes seen  No organisms seen  by cytocentrifuge      Abdominal Fl Abdominal Fluid  10-01-22   Rare Streptococcus anginosus "Susceptibilities not performed"  --    No polymorphonuclear cells seen  No organisms seen  by cytocentrifuge      .Blood Blood  09-29-22   No Growth Final  --  --      .Blood Blood  09-28-22   No Growth Final  --  --        RADIOLOGY  < from: CT Abdomen and Pelvis w/ Oral Cont and w/ IV Cont (10.12.22 @ 10:57) >  Status post Whipple procedure. Pancreatic duct stent in situ.    Residual peripancreatic stranding however. Pancreatic collections are   decreasing in size. No new collection.    < end of copied text >

## 2022-10-13 NOTE — CONSULT NOTE ADULT - ASSESSMENT
73 y/o female Bhut[place] Creole speaking presented with diagnosis of pancreatic cancer on endoscopy biopsy. Subsequent CT scan and PET scan confirm pathology. Patient is now s/p exploratory laparotomy with Whipple procedure, and placement of drains R posterior and L anterior to the anastomoses.    NEUROLOGIC   - Pain control: Tylenol  - Fentanyl  - PCA when patient is extubated    RESPIRATORY   - Monitor SpO2 goal >92%  - Incentive spirometry   - AC 10/450/5/40    CARDIOVASCULAR   - Monitor hemodynamics   - Off pressors    GASTROINTESTINAL   - Diet: NPO  - Monitor bowel function  - Monitor drain output  - Drain amylases  - NOT on Whipple pathway    /RENAL   - IV fluids: LR @ 100 cc/hr  - Strict I/Os  - Monitor BMP qd  - Maintain hernandez catheter, strict Is/Os  - Monitor electrolytes, replete PRN    HEMATOLOGIC  - Monitor H/H     INFECTIOUS DISEASE  - Monitor fever / WBC  - C/W Cipro and Flagyl x7d    ENDOCRINE  - Monitor gluc    LINES  - A line L radial  - Hernandez  - PIV     DISPO: SICU  
73 y/o female presents with diagnosis of pancreatic cancer on endoscopy biopsy. Subsequent CT scan and PET scan confirm pathology. S/p whipple procedure pancreatic cancer with purulent drainage from abd drain and E.coli and strep anginosus in cx and PCN allergy    Keaton Grimes  Attending Physician   Division of Infectious Disease  Office #[phone]  Available on Microsoft Teams also  After 5pm/weekend or no response, call #[phone]

## 2022-10-13 NOTE — PROVIDER CONTACT NOTE (CRITICAL VALUE NOTIFICATION) - SITUATION
Patient is 72y female s/p whipple for pancreatic cancer. Patient received critical lab values: Positive for moderate polymorphonuclear leukocytes per low power field; numerous gram negative rods per oil power field, numerous gram positive cocci per oil power field.

## 2022-10-13 NOTE — PROGRESS NOTE ADULT - ASSESSMENT
S/P Whipple procedure  Awaiting pathology report  Faye-pancreatic collection decreasing  Improved nutrition

## 2022-10-13 NOTE — DISCHARGE NOTE PROVIDER - NSDCMRMEDTOKEN_GEN_ALL_CORE_FT
hydrALAZINE 25 mg oral tablet: 1 tab(s) orally 2 times a day  Invokana 300 mg oral tablet: 1 tab(s) orally once a day AM  metoprolol tartrate 50 mg oral tablet: 1 tab(s) orally once a day AM  olmesartan 40 mg oral tablet: 1 tab(s) orally once a day AM  risperiDONE 2 mg oral tablet: 1 tab(s) orally once a day (at bedtime)  Tresiba 100 units/mL subcutaneous solution: 25 unit(s) subcutaneous once a day AM   acetaminophen 325 mg oral tablet: 2 tab(s) orally every 6 hours for pain  cephalexin 500 mg oral tablet: 1 tab(s) orally 3 times a day   hydrALAZINE 25 mg oral tablet: 1 tab(s) orally 2 times a day  Invokana 300 mg oral tablet: 1 tab(s) orally once a day AM  metoprolol tartrate 50 mg oral tablet: 1 tab(s) orally once a day AM  metroNIDAZOLE 500 mg oral tablet: 1 tab(s) orally 2 times a day  olmesartan 40 mg oral tablet: 1 tab(s) orally once a day AM  outpatient physical therapy:   risperiDONE 2 mg oral tablet: 1 tab(s) orally once a day (at bedtime)  Tresiba 100 units/mL subcutaneous solution: 25 unit(s) subcutaneous once a day AM

## 2022-10-13 NOTE — CHART NOTE - NSCHARTNOTEFT_GEN_A_CORE
NUTRITION FOLLOW-UP    Pt has a history of HTN, DM2, Jaundice, Uterine Fibroid and Pancreatic Cancer. Pt is s/p Whipple procedure on 9/22/22. Patient was extubated on 9/24/22 however overnight developed acute hypoxemia. Patient reintubated. Bedside bronchoscopy was performed and revealed mucous plug left main bronchus. Copious suction and irrigation performed to clear airway.     Pt is MCT oil as part of her diet. After extensive chart review it was found that there was a suspected chyle leak. Spoke with PA to ascertain if chyle leak is present since there was no further mention of it in chart.  Pt is eating about 50% of her meals. Pt is not taking the MCT oil. She is also not drinking Glucerna Shakes.     Weight: 79.2 kg (10/13/22)    Labs: 10-13 Na 139 mmol/L Glu 126 mg/dL<H> K+ 3.9 mmol/L Cr 0.58 mg/dL BUN 11 mg/dL Phos 2.6 mg/dL  10-13 @ 12:08 POCT 183 mg/dL  10-13 @ 08:02 POCT 124 mg/dL  10-12 @ 21:03 POCT 153 mg/dL  10-12 @ 17:31 POCT 165 mg/dL    Current Diet: Diet, Regular:   Consistent Carbohydrate {No Snacks} (CSTCHO)  Low Fat (LOWFAT)  Phase 3 Bariatric Regular ([id number])  MCT Oil (HNT Only)     Qty per Day:  45mL  Supplement Feeding Modality:  Oral  Glucerna Shake Cans or Servings Per Day:  1       Frequency:  Three Times a day (10-06-22 @ 07:23)    Skin: Surgical incision midline abdomen    Edema: No edema noted    RD to Remain Available: Xiomara Griffiths MS, RDN, CDN pager [phone]     Additional Recommendations:   1) Monitor weight, labs, po intake and skin integrity.

## 2022-10-14 LAB
-  AMIKACIN: SIGNIFICANT CHANGE UP
-  AMOXICILLIN/CLAVULANIC ACID: SIGNIFICANT CHANGE UP
-  AMPICILLIN/SULBACTAM: SIGNIFICANT CHANGE UP
-  AMPICILLIN: SIGNIFICANT CHANGE UP
-  AZTREONAM: SIGNIFICANT CHANGE UP
-  CEFAZOLIN: SIGNIFICANT CHANGE UP
-  CEFEPIME: SIGNIFICANT CHANGE UP
-  CEFOXITIN: SIGNIFICANT CHANGE UP
-  CEFTRIAXONE: SIGNIFICANT CHANGE UP
-  CIPROFLOXACIN: SIGNIFICANT CHANGE UP
-  ERTAPENEM: SIGNIFICANT CHANGE UP
-  GENTAMICIN: SIGNIFICANT CHANGE UP
-  IMIPENEM: SIGNIFICANT CHANGE UP
-  LEVOFLOXACIN: SIGNIFICANT CHANGE UP
-  MEROPENEM: SIGNIFICANT CHANGE UP
-  PIPERACILLIN/TAZOBACTAM: SIGNIFICANT CHANGE UP
-  TOBRAMYCIN: SIGNIFICANT CHANGE UP
-  TRIMETHOPRIM/SULFAMETHOXAZOLE: SIGNIFICANT CHANGE UP
AMYLASE FLD-CCNC: 21 U/L — SIGNIFICANT CHANGE UP
ANION GAP SERPL CALC-SCNC: 7 MMOL/L — SIGNIFICANT CHANGE UP (ref 7–14)
BUN SERPL-MCNC: 9 MG/DL — SIGNIFICANT CHANGE UP (ref 7–23)
CALCIUM SERPL-MCNC: 8.2 MG/DL — LOW (ref 8.4–10.5)
CHLORIDE SERPL-SCNC: 102 MMOL/L — SIGNIFICANT CHANGE UP (ref 98–107)
CO2 SERPL-SCNC: 26 MMOL/L — SIGNIFICANT CHANGE UP (ref 22–31)
CREAT SERPL-MCNC: 0.58 MG/DL — SIGNIFICANT CHANGE UP (ref 0.5–1.3)
CULTURE RESULTS: SIGNIFICANT CHANGE UP
EGFR: 96 ML/MIN/1.73M2 — SIGNIFICANT CHANGE UP
GLUCOSE BLDC GLUCOMTR-MCNC: 124 MG/DL — HIGH (ref 70–99)
GLUCOSE BLDC GLUCOMTR-MCNC: 146 MG/DL — HIGH (ref 70–99)
GLUCOSE BLDC GLUCOMTR-MCNC: 162 MG/DL — HIGH (ref 70–99)
GLUCOSE BLDC GLUCOMTR-MCNC: 91 MG/DL — SIGNIFICANT CHANGE UP (ref 70–99)
GLUCOSE SERPL-MCNC: 103 MG/DL — HIGH (ref 70–99)
HCT VFR BLD CALC: 28.1 % — LOW (ref 34.5–45)
HGB BLD-MCNC: 8.7 G/DL — LOW (ref 11.5–15.5)
MAGNESIUM SERPL-MCNC: 2 MG/DL — SIGNIFICANT CHANGE UP (ref 1.6–2.6)
MCHC RBC-ENTMCNC: 27.3 PG — SIGNIFICANT CHANGE UP (ref 27–34)
MCHC RBC-ENTMCNC: 31 GM/DL — LOW (ref 32–36)
MCV RBC AUTO: 88.1 FL — SIGNIFICANT CHANGE UP (ref 80–100)
METHOD TYPE: SIGNIFICANT CHANGE UP
NRBC # BLD: 0 /100 WBCS — SIGNIFICANT CHANGE UP (ref 0–0)
NRBC # FLD: 0 K/UL — SIGNIFICANT CHANGE UP (ref 0–0)
ORGANISM # SPEC MICROSCOPIC CNT: SIGNIFICANT CHANGE UP
ORGANISM # SPEC MICROSCOPIC CNT: SIGNIFICANT CHANGE UP
PHOSPHATE SERPL-MCNC: 3 MG/DL — SIGNIFICANT CHANGE UP (ref 2.5–4.5)
PLATELET # BLD AUTO: 529 K/UL — HIGH (ref 150–400)
POTASSIUM SERPL-MCNC: 4.2 MMOL/L — SIGNIFICANT CHANGE UP (ref 3.5–5.3)
POTASSIUM SERPL-SCNC: 4.2 MMOL/L — SIGNIFICANT CHANGE UP (ref 3.5–5.3)
RBC # BLD: 3.19 M/UL — LOW (ref 3.8–5.2)
RBC # FLD: 13.2 % — SIGNIFICANT CHANGE UP (ref 10.3–14.5)
SODIUM SERPL-SCNC: 135 MMOL/L — SIGNIFICANT CHANGE UP (ref 135–145)
SPECIMEN SOURCE: SIGNIFICANT CHANGE UP
WBC # BLD: 10.45 K/UL — SIGNIFICANT CHANGE UP (ref 3.8–10.5)
WBC # FLD AUTO: 10.45 K/UL — SIGNIFICANT CHANGE UP (ref 3.8–10.5)

## 2022-10-14 PROCEDURE — 99232 SBSQ HOSP IP/OBS MODERATE 35: CPT

## 2022-10-14 PROCEDURE — 74018 RADEX ABDOMEN 1 VIEW: CPT | Mod: 26

## 2022-10-14 RX ORDER — ONDANSETRON 8 MG/1
4 TABLET, FILM COATED ORAL ONCE
Refills: 0 | Status: COMPLETED | OUTPATIENT
Start: 2022-10-14 | End: 2022-10-14

## 2022-10-14 RX ORDER — KETOROLAC TROMETHAMINE 30 MG/ML
15 SYRINGE (ML) INJECTION ONCE
Refills: 0 | Status: DISCONTINUED | OUTPATIENT
Start: 2022-10-14 | End: 2022-10-14

## 2022-10-14 RX ORDER — SODIUM CHLORIDE 9 MG/ML
1000 INJECTION, SOLUTION INTRAVENOUS
Refills: 0 | Status: DISCONTINUED | OUTPATIENT
Start: 2022-10-14 | End: 2022-10-17

## 2022-10-14 RX ADMIN — Medication 500 MILLIGRAM(S): at 23:31

## 2022-10-14 RX ADMIN — Medication 975 MILLIGRAM(S): at 17:46

## 2022-10-14 RX ADMIN — SODIUM CHLORIDE 75 MILLILITER(S): 9 INJECTION, SOLUTION INTRAVENOUS at 21:57

## 2022-10-14 RX ADMIN — Medication 975 MILLIGRAM(S): at 06:20

## 2022-10-14 RX ADMIN — SODIUM CHLORIDE 75 MILLILITER(S): 9 INJECTION, SOLUTION INTRAVENOUS at 11:12

## 2022-10-14 RX ADMIN — Medication 500 MILLIGRAM(S): at 00:00

## 2022-10-14 RX ADMIN — Medication 15 MILLIGRAM(S): at 15:40

## 2022-10-14 RX ADMIN — Medication 975 MILLIGRAM(S): at 00:30

## 2022-10-14 RX ADMIN — ENOXAPARIN SODIUM 40 MILLIGRAM(S): 100 INJECTION SUBCUTANEOUS at 12:15

## 2022-10-14 RX ADMIN — SODIUM CHLORIDE 75 MILLILITER(S): 9 INJECTION, SOLUTION INTRAVENOUS at 23:32

## 2022-10-14 RX ADMIN — Medication 975 MILLIGRAM(S): at 23:31

## 2022-10-14 RX ADMIN — INSULIN GLARGINE 9 UNIT(S): 100 INJECTION, SOLUTION SUBCUTANEOUS at 21:54

## 2022-10-14 RX ADMIN — RISPERIDONE 2 MILLIGRAM(S): 4 TABLET ORAL at 21:57

## 2022-10-14 RX ADMIN — Medication 500 MILLIGRAM(S): at 12:16

## 2022-10-14 RX ADMIN — Medication 1 DROP(S): at 06:20

## 2022-10-14 RX ADMIN — Medication 50 MILLIGRAM(S): at 06:20

## 2022-10-14 RX ADMIN — ONDANSETRON 4 MILLIGRAM(S): 8 TABLET, FILM COATED ORAL at 08:31

## 2022-10-14 RX ADMIN — Medication 2: at 21:53

## 2022-10-14 RX ADMIN — Medication 975 MILLIGRAM(S): at 12:16

## 2022-10-14 RX ADMIN — CEFTRIAXONE 100 MILLIGRAM(S): 500 INJECTION, POWDER, FOR SOLUTION INTRAMUSCULAR; INTRAVENOUS at 21:56

## 2022-10-14 RX ADMIN — Medication 975 MILLIGRAM(S): at 06:30

## 2022-10-14 RX ADMIN — PANTOPRAZOLE SODIUM 40 MILLIGRAM(S): 20 TABLET, DELAYED RELEASE ORAL at 12:15

## 2022-10-14 RX ADMIN — OCTREOTIDE ACETATE 100 MICROGRAM(S): 200 INJECTION, SOLUTION INTRAVENOUS; SUBCUTANEOUS at 17:46

## 2022-10-14 RX ADMIN — Medication 1 DROP(S): at 17:47

## 2022-10-14 NOTE — PROGRESS NOTE ADULT - ASSESSMENT
73 y/o female presents with diagnosis of pancreatic cancer on endoscopy biopsy. Subsequent CT scan and PET scan confirm pathology. S/p whipple procedure pancreatic cancer with purulent drainage from abd drain and E.coli and strep anginosus in cx and PCN allergy    Keaton Grimes  Attending Physician   Division of Infectious Disease  Office #[phone]  Available on Microsoft Teams also  After 5pm/weekend or no response, call #[phone]

## 2022-10-14 NOTE — PROGRESS NOTE ADULT - SUBJECTIVE AND OBJECTIVE BOX
Subjective:  No acute overnight events.  Patient seen and examined at bedside with surgical team during morning rounds.  Patient reports nausea/vomiting and states she is not feeling well. Denies CP/SOB.    Exam:  Constitutional: A&Ox3, NAD  Respiratory: Unlabored breathing  Abdomen: Soft, nondistended, nontender. L MARGRET drain w/ purulent fluid. Site of previous R MARGRET w/ expressed purulent drainage  Extremities: WWP, VENEGAS spontaneously      T(C): 36.7 (10-14-22 @ 08:15), Max: 37.1 (10-13-22 @ 20:21)  HR: 94 (10-14-22 @ 08:15) (72 - 94)  BP: 125/46 (10-14-22 @ 08:15) (107/60 - 145/51)  RR: 19 (10-14-22 @ 08:15) (18 - 19)  SpO2: 97% (10-14-22 @ 08:15) (97% - 100%)      10-13-22 @ 07:01  -  10-14-22 @ 07:00  --------------------------------------------------------  IN: 250 mL / OUT: 1367 mL / NET: -1117 mL    10-14-22 @ 07:01  -  10-14-22 @ 10:47  --------------------------------------------------------  IN: 0 mL / OUT: 10 mL / NET: -10 mL        LABS:  cret                        8.7    10.45 )-----------( 529      ( 14 Oct 2022 05:20 )             28.1     10-14    135  |  102  |  9   ----------------------------<  103<H>  4.2   |  26  |  0.58    Ca    8.2<L>      14 Oct 2022 05:20  Phos  3.0     10-14  Mg     2.00     10-14            acetaminophen     Tablet .. 975 milliGRAM(s) Oral every 6 hours  artificial  tears Solution 1 Drop(s) Both EYES two times a day  cefTRIAXone   IVPB 1000 milliGRAM(s) IV Intermittent every 24 hours  enoxaparin Injectable 40 milliGRAM(s) SubCutaneous every 24 hours  insulin glargine Injectable (LANTUS) 9 Unit(s) SubCutaneous at bedtime  insulin lispro (ADMELOG) corrective regimen sliding scale   SubCutaneous Before meals and at bedtime  metoprolol tartrate 50 milliGRAM(s) Oral daily  metroNIDAZOLE    Tablet 500 milliGRAM(s) Oral every 12 hours  octreotide  Injectable 100 MICROGram(s) SubCutaneous daily  pantoprazole  Injectable 40 milliGRAM(s) IV Push every 24 hours  risperiDONE   Tablet 2 milliGRAM(s) Oral at bedtime  sodium chloride 0.9% lock flush 10 milliLiter(s) IV Push every 1 hour PRN      Imaging:

## 2022-10-14 NOTE — PROGRESS NOTE ADULT - SUBJECTIVE AND OBJECTIVE BOX
POD # 21        Subjective: I vomited yesterday and Ifeel full after breakfast this morning.    Objective:   Vital Signs Last 24 Hrs  T(C): 36.7 (14 Oct 2022 08:15), Max: 37.1 (13 Oct 2022 20:21)  T(F): 98 (14 Oct 2022 08:15), Max: 98.8 (13 Oct 2022 20:21)  HR: 94 (14 Oct 2022 08:15) (72 - 94)  BP: 125/46 (14 Oct 2022 08:15) (107/60 - 145/51)  BP(mean): --  RR: 19 (14 Oct 2022 08:15) (18 - 19)  SpO2: 97% (14 Oct 2022 08:15) (97% - 100%)    Parameters below as of 14 Oct 2022 08:15  Patient On (Oxygen Delivery Method): room air        Daily     Daily     Heent: N/C, AT PER no scleral icterus, No JVD  Pul: clear  Cor: RRR  Abdomen: soft, tender on palpation in the left upper quadrant but no peritoneal signs, normal BS. Wound - clean - fully healed  Drain output scant and less purulent looking.  Extremities: without edema, motor/sensory intact    I&O's Detail    13 Oct 2022 07:01  -  14 Oct 2022 07:00  --------------------------------------------------------  IN:    Oral Fluid: 250 mL  Total IN: 250 mL    OUT:    Drain (mL): 17 mL    Voided (mL): 1350 mL  Total OUT: 1367 mL    Total NET: -1117 mL      14 Oct 2022 07:01  -  14 Oct 2022 10:42  --------------------------------------------------------  IN:  Total IN: 0 mL    OUT:    Drain (mL): 10 mL  Total OUT: 10 mL    Total NET: -10 mL          MEDICATIONS  (STANDING):  acetaminophen     Tablet .. 975 milliGRAM(s) Oral every 6 hours  artificial  tears Solution 1 Drop(s) Both EYES two times a day  cefTRIAXone   IVPB 1000 milliGRAM(s) IV Intermittent every 24 hours  enoxaparin Injectable 40 milliGRAM(s) SubCutaneous every 24 hours  insulin glargine Injectable (LANTUS) 9 Unit(s) SubCutaneous at bedtime  insulin lispro (ADMELOG) corrective regimen sliding scale   SubCutaneous Before meals and at bedtime  metoprolol tartrate 50 milliGRAM(s) Oral daily  metroNIDAZOLE    Tablet 500 milliGRAM(s) Oral every 12 hours  octreotide  Injectable 100 MICROGram(s) SubCutaneous daily  pantoprazole  Injectable 40 milliGRAM(s) IV Push every 24 hours  risperiDONE   Tablet 2 milliGRAM(s) Oral at bedtime    MEDICATIONS  (PRN):  sodium chloride 0.9% lock flush 10 milliLiter(s) IV Push every 1 hour PRN Pre/post blood products, medications, blood draw, and to maintain line patency                            8.7    10.45 )-----------( 529      ( 14 Oct 2022 05:20 )             28.1     10-14    135  |  102  |  9   ----------------------------<  103<H>  4.2   |  26  |  0.58    Ca    8.2<L>      14 Oct 2022 05:20  Phos  3.0     10-14  Mg     2.00     10-14          Radiologic Studies:

## 2022-10-14 NOTE — PROGRESS NOTE ADULT - ASSESSMENT
S/P Whipple's procedure  Pathology reviewed (T3N2,M0)    Nausea and vomiting -R/o delayed gastric emptying

## 2022-10-14 NOTE — PROGRESS NOTE ADULT - GASTROINTESTINAL
midline incision clean with left side drain with purulent fluid/soft/nontender/nondistended details…

## 2022-10-14 NOTE — PROGRESS NOTE ADULT - ASSESSMENT
Patient is a 72 year old female with a PMHx of DM2, anxiety, depression and HTN who presented to pre-surgical testing with diagnosis of pancreatic cancer.  Patient is now S/P ex-lap and Whipple on 9/23/22.      PLAN:  - NPO/IVF  - f/u AXR  - Monitor MARGRET drain output, amylase daily  - Out of bed  - Pain control  - VTE prophylaxis with Lovenox subcutaneous    A Team Surgery  pager: [phone]

## 2022-10-15 LAB
ALBUMIN SERPL ELPH-MCNC: 2 G/DL — LOW (ref 3.3–5)
ALP SERPL-CCNC: 279 U/L — HIGH (ref 40–120)
ALT FLD-CCNC: 16 U/L — SIGNIFICANT CHANGE UP (ref 4–33)
ANION GAP SERPL CALC-SCNC: 9 MMOL/L — SIGNIFICANT CHANGE UP (ref 7–14)
AST SERPL-CCNC: 12 U/L — SIGNIFICANT CHANGE UP (ref 4–32)
BILIRUB SERPL-MCNC: 0.2 MG/DL — SIGNIFICANT CHANGE UP (ref 0.2–1.2)
BUN SERPL-MCNC: 9 MG/DL — SIGNIFICANT CHANGE UP (ref 7–23)
CALCIUM SERPL-MCNC: 8 MG/DL — LOW (ref 8.4–10.5)
CHLORIDE SERPL-SCNC: 105 MMOL/L — SIGNIFICANT CHANGE UP (ref 98–107)
CO2 SERPL-SCNC: 25 MMOL/L — SIGNIFICANT CHANGE UP (ref 22–31)
CREAT SERPL-MCNC: 0.64 MG/DL — SIGNIFICANT CHANGE UP (ref 0.5–1.3)
EGFR: 94 ML/MIN/1.73M2 — SIGNIFICANT CHANGE UP
GLUCOSE BLDC GLUCOMTR-MCNC: 122 MG/DL — HIGH (ref 70–99)
GLUCOSE BLDC GLUCOMTR-MCNC: 130 MG/DL — HIGH (ref 70–99)
GLUCOSE BLDC GLUCOMTR-MCNC: 146 MG/DL — HIGH (ref 70–99)
GLUCOSE BLDC GLUCOMTR-MCNC: 157 MG/DL — HIGH (ref 70–99)
GLUCOSE SERPL-MCNC: 80 MG/DL — SIGNIFICANT CHANGE UP (ref 70–99)
HCT VFR BLD CALC: 29.5 % — LOW (ref 34.5–45)
HGB BLD-MCNC: 9.4 G/DL — LOW (ref 11.5–15.5)
MAGNESIUM SERPL-MCNC: 1.8 MG/DL — SIGNIFICANT CHANGE UP (ref 1.6–2.6)
MCHC RBC-ENTMCNC: 28.5 PG — SIGNIFICANT CHANGE UP (ref 27–34)
MCHC RBC-ENTMCNC: 31.9 GM/DL — LOW (ref 32–36)
MCV RBC AUTO: 89.4 FL — SIGNIFICANT CHANGE UP (ref 80–100)
NRBC # BLD: 0 /100 WBCS — SIGNIFICANT CHANGE UP (ref 0–0)
NRBC # FLD: 0 K/UL — SIGNIFICANT CHANGE UP (ref 0–0)
PHOSPHATE SERPL-MCNC: 2.8 MG/DL — SIGNIFICANT CHANGE UP (ref 2.5–4.5)
PLATELET # BLD AUTO: 537 K/UL — HIGH (ref 150–400)
POTASSIUM SERPL-MCNC: 4.2 MMOL/L — SIGNIFICANT CHANGE UP (ref 3.5–5.3)
POTASSIUM SERPL-SCNC: 4.2 MMOL/L — SIGNIFICANT CHANGE UP (ref 3.5–5.3)
PROT SERPL-MCNC: 5 G/DL — LOW (ref 6–8.3)
RBC # BLD: 3.3 M/UL — LOW (ref 3.8–5.2)
RBC # FLD: 13.4 % — SIGNIFICANT CHANGE UP (ref 10.3–14.5)
SODIUM SERPL-SCNC: 139 MMOL/L — SIGNIFICANT CHANGE UP (ref 135–145)
WBC # BLD: 11.91 K/UL — HIGH (ref 3.8–10.5)
WBC # FLD AUTO: 11.91 K/UL — HIGH (ref 3.8–10.5)

## 2022-10-15 PROCEDURE — 99232 SBSQ HOSP IP/OBS MODERATE 35: CPT

## 2022-10-15 RX ORDER — OCTREOTIDE ACETATE 200 UG/ML
50 INJECTION, SOLUTION INTRAVENOUS; SUBCUTANEOUS DAILY
Refills: 0 | Status: DISCONTINUED | OUTPATIENT
Start: 2022-10-15 | End: 2022-10-17

## 2022-10-15 RX ORDER — ONDANSETRON 8 MG/1
4 TABLET, FILM COATED ORAL ONCE
Refills: 0 | Status: COMPLETED | OUTPATIENT
Start: 2022-10-15 | End: 2022-10-15

## 2022-10-15 RX ADMIN — Medication 2: at 17:57

## 2022-10-15 RX ADMIN — ONDANSETRON 4 MILLIGRAM(S): 8 TABLET, FILM COATED ORAL at 17:25

## 2022-10-15 RX ADMIN — OCTREOTIDE ACETATE 50 MICROGRAM(S): 200 INJECTION, SOLUTION INTRAVENOUS; SUBCUTANEOUS at 17:25

## 2022-10-15 RX ADMIN — Medication 975 MILLIGRAM(S): at 17:27

## 2022-10-15 RX ADMIN — Medication 975 MILLIGRAM(S): at 12:47

## 2022-10-15 RX ADMIN — PANTOPRAZOLE SODIUM 40 MILLIGRAM(S): 20 TABLET, DELAYED RELEASE ORAL at 12:47

## 2022-10-15 RX ADMIN — Medication 975 MILLIGRAM(S): at 06:10

## 2022-10-15 RX ADMIN — Medication 1 DROP(S): at 17:26

## 2022-10-15 RX ADMIN — RISPERIDONE 2 MILLIGRAM(S): 4 TABLET ORAL at 22:28

## 2022-10-15 RX ADMIN — Medication 1 DROP(S): at 06:11

## 2022-10-15 RX ADMIN — ENOXAPARIN SODIUM 40 MILLIGRAM(S): 100 INJECTION SUBCUTANEOUS at 12:47

## 2022-10-15 RX ADMIN — SODIUM CHLORIDE 75 MILLILITER(S): 9 INJECTION, SOLUTION INTRAVENOUS at 12:47

## 2022-10-15 RX ADMIN — INSULIN GLARGINE 9 UNIT(S): 100 INJECTION, SOLUTION SUBCUTANEOUS at 22:26

## 2022-10-15 RX ADMIN — Medication 975 MILLIGRAM(S): at 07:04

## 2022-10-15 RX ADMIN — Medication 50 MILLIGRAM(S): at 06:11

## 2022-10-15 RX ADMIN — SODIUM CHLORIDE 75 MILLILITER(S): 9 INJECTION, SOLUTION INTRAVENOUS at 22:29

## 2022-10-15 RX ADMIN — Medication 500 MILLIGRAM(S): at 12:47

## 2022-10-15 RX ADMIN — CEFTRIAXONE 100 MILLIGRAM(S): 500 INJECTION, POWDER, FOR SOLUTION INTRAMUSCULAR; INTRAVENOUS at 22:31

## 2022-10-15 NOTE — PROGRESS NOTE ADULT - ASSESSMENT
Patient is a 72 year old female with a PMHx of DM2, anxiety, depression and HTN who presented to pre-surgical testing with diagnosis of pancreatic cancer.  Patient is now S/P ex-lap and Whipple on 9/23/22.      PLAN:  - NPO/IVF  - Monitor MARGRET drain output, amylase daily  - Out of bed  - Pain control  - PT rec outpatient PT  - VTE prophylaxis with Lovenox subcutaneous    A Team Surgery  pager: [phone] Patient is a 72 year old female with a PMHx of DM2, anxiety, depression and HTN who presented to pre-surgical testing with diagnosis of pancreatic cancer.  Patient is now S/P ex-lap and Whipple on 9/23/22.      PLAN:  - advanced to CLD  - decreased octreotide to 50mg QD  - Monitor MARGRET drain output, amylase daily  - Out of bed  - Pain control  - PT rec outpatient PT  - VTE prophylaxis with Lovenox subcutaneous    A Team Surgery  pager: [phone]

## 2022-10-15 NOTE — PROGRESS NOTE ADULT - ASSESSMENT
73 y/o female presents with diagnosis of pancreatic cancer on endoscopy biopsy. Subsequent CT scan and PET scan confirm pathology. S/p whipple procedure pancreatic cancer with purulent drainage from abd drain and E.coli and strep anginosus in cx and PCN allergy

## 2022-10-15 NOTE — PROGRESS NOTE ADULT - SUBJECTIVE AND OBJECTIVE BOX
Subjective: I have no more nausea and no more abdominal pain. I wish to go home.    Objective: No more nausea or abdominal pain.  Vital Signs Last 24 Hrs  T(C): 37.2 (15 Oct 2022 11:51), Max: 37.2 (15 Oct 2022 11:51)  T(F): 99 (15 Oct 2022 11:51), Max: 99 (15 Oct 2022 11:51)  HR: 73 (15 Oct 2022 11:51) (73 - 88)  BP: 105/41 (15 Oct 2022 11:51) (105/41 - 137/53)  BP(mean): 1 (14 Oct 2022 22:06) (1 - 1)  RR: 16 (15 Oct 2022 11:51) (15 - 18)  SpO2: 100% (15 Oct 2022 11:51) (96% - 100%)    Parameters below as of 15 Oct 2022 11:51  Patient On (Oxygen Delivery Method): room air        Daily     Daily     Heent: N/C, AT PER no scleral icterus, No JVD  Pul: clear  Cor: RRR  Abdomen: soft, non distended, normal BS. Wound - clean - all staples out - Rod drain stripped for scant discharge but still looks purulent.  Extremities: without edema, motor/sensory intact    I&O's Detail    14 Oct 2022 07:01  -  15 Oct 2022 07:00  --------------------------------------------------------  IN:    dextrose 5% + sodium chloride 0.9%: 1425 mL  Total IN: 1425 mL    OUT:    Drain (mL): 25 mL    Oral Fluid: 0 mL    Voided (mL): 700 mL  Total OUT: 725 mL    Total NET: 700 mL      15 Oct 2022 07:01  -  15 Oct 2022 14:18  --------------------------------------------------------  IN:  Total IN: 0 mL    OUT:    Drain (mL): 5 mL    Voided (mL): 300 mL  Total OUT: 305 mL    Total NET: -305 mL          MEDICATIONS  (STANDING):  acetaminophen     Tablet .. 975 milliGRAM(s) Oral every 6 hours  artificial  tears Solution 1 Drop(s) Both EYES two times a day  cefTRIAXone   IVPB 1000 milliGRAM(s) IV Intermittent every 24 hours  dextrose 5% + sodium chloride 0.9%. 1000 milliLiter(s) (75 mL/Hr) IV Continuous <Continuous>  enoxaparin Injectable 40 milliGRAM(s) SubCutaneous every 24 hours  insulin glargine Injectable (LANTUS) 9 Unit(s) SubCutaneous at bedtime  insulin lispro (ADMELOG) corrective regimen sliding scale   SubCutaneous Before meals and at bedtime  metoprolol tartrate 50 milliGRAM(s) Oral daily  metroNIDAZOLE    Tablet 500 milliGRAM(s) Oral every 12 hours  octreotide  Injectable 50 MICROGram(s) SubCutaneous daily  pantoprazole  Injectable 40 milliGRAM(s) IV Push every 24 hours  risperiDONE   Tablet 2 milliGRAM(s) Oral at bedtime    MEDICATIONS  (PRN):  sodium chloride 0.9% lock flush 10 milliLiter(s) IV Push every 1 hour PRN Pre/post blood products, medications, blood draw, and to maintain line patency                            9.4    11.91 )-----------( 537      ( 15 Oct 2022 05:53 )             29.5     10-15    139  |  105  |  9   ----------------------------<  80  4.2   |  25  |  0.64    Ca    8.0<L>      15 Oct 2022 05:53  Phos  2.8     10-15  Mg     1.80     10-15    TPro  5.0<L>  /  Alb  2.0<L>  /  TBili  0.2  /  DBili  x   /  AST  12  /  ALT  16  /  AlkPhos  279<H>  10-15        Radiologic Studies:

## 2022-10-15 NOTE — PROGRESS NOTE ADULT - SUBJECTIVE AND OBJECTIVE BOX
RUBIN HO 72y [id number]    Patient is a 72y old  Female who presents with a chief complaint of Pancreatic carcinoma (14 Oct 2022 10:41)      Follow Up/CC:  ID following for abd collections     Interval History/ROS:  feels ok  weak    Allergies    penicillin (Other)    Intolerances        ANTIMICROBIALS: cefTRIAXone   IVPB 1000 every 24 hours  metroNIDAZOLE    Tablet 500 every 12 hours        MEDICATIONS  (STANDING):  acetaminophen     Tablet .. 975 milliGRAM(s) Oral every 6 hours  artificial  tears Solution 1 Drop(s) Both EYES two times a day  cefTRIAXone   IVPB 1000 milliGRAM(s) IV Intermittent every 24 hours  dextrose 5% + sodium chloride 0.9%. 1000 milliLiter(s) (75 mL/Hr) IV Continuous <Continuous>  enoxaparin Injectable 40 milliGRAM(s) SubCutaneous every 24 hours  insulin glargine Injectable (LANTUS) 9 Unit(s) SubCutaneous at bedtime  insulin lispro (ADMELOG) corrective regimen sliding scale   SubCutaneous Before meals and at bedtime  metoprolol tartrate 50 milliGRAM(s) Oral daily  metroNIDAZOLE    Tablet 500 milliGRAM(s) Oral every 12 hours  octreotide  Injectable 100 MICROGram(s) SubCutaneous daily  pantoprazole  Injectable 40 milliGRAM(s) IV Push every 24 hours  risperiDONE   Tablet 2 milliGRAM(s) Oral at bedtime    MEDICATIONS  (PRN):  sodium chloride 0.9% lock flush 10 milliLiter(s) IV Push every 1 hour PRN Pre/post blood products, medications, blood draw, and to maintain line patency    Vital Signs Last 24 Hrs  T(F): 98.6 (10-15-22 @ 05:16), Max: 98.8 (10-14-22 @ 22:06)  HR: 88 (10-15-22 @ 05:16)  BP: 137/53 (10-15-22 @ 05:16)  RR: 18 (10-15-22 @ 05:16)  SpO2: 100% (10-15-22 @ 05:16) (96% - 100%)      awakens easily  non toxic  s1s2  no respiratory distress on RA  abd soft min tender                              9.4    11.91 )-----------( 537      ( 15 Oct 2022 05:53 )             29.5 10-15    139  |  105  |  9   ----------------------------<  80  4.2   |  25  |  0.64  Ca    8.0      15 Oct 2022 05:53Phos  2.8     10-15Mg     1.80     10-15  TPro  5.0  /  Alb  2.0  /  TBili  0.2  /  DBili  x   /  AST  12  /  ALT  16  /  AlkPhos  279  10-15        MICROBIOLOGY:  Abdominal Fl Abdominal Fluid  10-12-22   Moderate Escherichia coli  Moderate Streptococcus anginosus  --  Escherichia coli      Abdominal Fl Abdominal Fluid  10-01-22   No growth at 5 days  --    polymorphonuclear leukocytes seen  No organisms seen  by cytocentrifuge      Abdominal Fl Abdominal Fluid  10-01-22   Rare Streptococcus anginosus "Susceptibilities not performed"  --    No polymorphonuclear cells seen  No organisms seen  by cytocentrifuge      .Blood Blood  09-29-22   No Growth Final  --  --      .Blood Blood  09-28-22   No Growth Final  --  --          RADIOLOGY    r< from: Xray Abdomen 1 View PORTABLE -Urgent (Xray Abdomen 1 View PORTABLE -Urgent .) (10.14.22 @ 09:32) >  ACC: [id number] EXAM:  XR ABDOMEN PORTABLE URGENT 1V                          PROCEDURE DATE:  10/14/2022          INTERPRETATION:  CLINICAL INDICATION: Status post Whipple procedure.   Nausea, vomiting.    TECHNIQUE: Frontal view of the abdomen.    COMPARISON: CT abdomen/pelvis 10/12/2022. Abdominal x-ray 10/2/2022.    FINDINGS:    Nonobstructive bowel gas pattern. No free air visualized. Mild to   moderate colonic stool burden.    Surgical drain overlies left hemiabdomen. Surgical clips and skin staples   in the right hemiabdomen. No acute osseous abnormalities.    The visualized portions of the chest are unremarkable.    IMPRESSION:    Nonobstructive bowel gas pattern. Mild to moderate colonic stool burden.    --- End of Report ---          IFEANYI MONSALVE MD; Resident Radiologist  This document has been electronically signed.  SUGEY PANDYA MD; Attending Radiologist  This document has been electronically signed. Oct 14 2022  9:59AM    < end of copied text >

## 2022-10-15 NOTE — PROGRESS NOTE ADULT - SUBJECTIVE AND OBJECTIVE BOX
Surgery Progress Note      SUBJECTIVE: Patient seen and examined at bedside with surgical team. No acute events overnight.     OBJECTIVE:    Vital Signs Last 24 Hrs  T(C): 37 (15 Oct 2022 05:16), Max: 37.1 (14 Oct 2022 18:04)  T(F): 98.6 (15 Oct 2022 05:16), Max: 98.8 (14 Oct 2022 22:06)  HR: 88 (15 Oct 2022 05:16) (73 - 94)  BP: 137/53 (15 Oct 2022 05:16) (119/50 - 137/53)  BP(mean): 1 (14 Oct 2022 22:06) (1 - 1)  RR: 18 (15 Oct 2022 05:16) (15 - 19)  SpO2: 100% (15 Oct 2022 05:16) (96% - 100%)    Parameters below as of 15 Oct 2022 05:16  Patient On (Oxygen Delivery Method): room air    I&O's Detail    14 Oct 2022 07:01  -  15 Oct 2022 07:00  --------------------------------------------------------  IN:    dextrose 5% + sodium chloride 0.9%: 1425 mL  Total IN: 1425 mL    OUT:    Drain (mL): 25 mL    Oral Fluid: 0 mL    Voided (mL): 700 mL  Total OUT: 725 mL    Total NET: 700 mL      MEDICATIONS  (STANDING):  acetaminophen     Tablet .. 975 milliGRAM(s) Oral every 6 hours  artificial  tears Solution 1 Drop(s) Both EYES two times a day  cefTRIAXone   IVPB 1000 milliGRAM(s) IV Intermittent every 24 hours  dextrose 5% + sodium chloride 0.9%. 1000 milliLiter(s) (75 mL/Hr) IV Continuous <Continuous>  enoxaparin Injectable 40 milliGRAM(s) SubCutaneous every 24 hours  insulin glargine Injectable (LANTUS) 9 Unit(s) SubCutaneous at bedtime  insulin lispro (ADMELOG) corrective regimen sliding scale   SubCutaneous Before meals and at bedtime  metoprolol tartrate 50 milliGRAM(s) Oral daily  metroNIDAZOLE    Tablet 500 milliGRAM(s) Oral every 12 hours  octreotide  Injectable 100 MICROGram(s) SubCutaneous daily  pantoprazole  Injectable 40 milliGRAM(s) IV Push every 24 hours  risperiDONE   Tablet 2 milliGRAM(s) Oral at bedtime    MEDICATIONS  (PRN):  sodium chloride 0.9% lock flush 10 milliLiter(s) IV Push every 1 hour PRN Pre/post blood products, medications, blood draw, and to maintain line patency      PHYSICAL EXAM:  Constitutional: A&Ox3, NAD  Respiratory: Unlabored breathing  Abdomen: Soft, nondistended, nontender. No rebound or guarding.  Extremities: WWP, VENEGAS spontaneously    LABS:                        9.4    11.91 )-----------( 537      ( 15 Oct 2022 05:53 )             29.5     10-14    135  |  102  |  9   ----------------------------<  103<H>  4.2   |  26  |  0.58    Ca    8.2<L>      14 Oct 2022 05:20  Phos  3.0     10-14  Mg     2.00     10-14                 Surgery Progress Note      SUBJECTIVE: Patient seen and examined at bedside with surgical team. No acute events overnight. Patient denies nausea, vomiting, pain, fevers, chills.    OBJECTIVE:    Vital Signs Last 24 Hrs  T(C): 37 (15 Oct 2022 05:16), Max: 37.1 (14 Oct 2022 18:04)  T(F): 98.6 (15 Oct 2022 05:16), Max: 98.8 (14 Oct 2022 22:06)  HR: 88 (15 Oct 2022 05:16) (73 - 94)  BP: 137/53 (15 Oct 2022 05:16) (119/50 - 137/53)  BP(mean): 1 (14 Oct 2022 22:06) (1 - 1)  RR: 18 (15 Oct 2022 05:16) (15 - 19)  SpO2: 100% (15 Oct 2022 05:16) (96% - 100%)    Parameters below as of 15 Oct 2022 05:16  Patient On (Oxygen Delivery Method): room air    I&O's Detail    14 Oct 2022 07:01  -  15 Oct 2022 07:00  --------------------------------------------------------  IN:    dextrose 5% + sodium chloride 0.9%: 1425 mL  Total IN: 1425 mL    OUT:    Drain (mL): 25 mL    Oral Fluid: 0 mL    Voided (mL): 700 mL  Total OUT: 725 mL    Total NET: 700 mL      MEDICATIONS  (STANDING):  acetaminophen     Tablet .. 975 milliGRAM(s) Oral every 6 hours  artificial  tears Solution 1 Drop(s) Both EYES two times a day  cefTRIAXone   IVPB 1000 milliGRAM(s) IV Intermittent every 24 hours  dextrose 5% + sodium chloride 0.9%. 1000 milliLiter(s) (75 mL/Hr) IV Continuous <Continuous>  enoxaparin Injectable 40 milliGRAM(s) SubCutaneous every 24 hours  insulin glargine Injectable (LANTUS) 9 Unit(s) SubCutaneous at bedtime  insulin lispro (ADMELOG) corrective regimen sliding scale   SubCutaneous Before meals and at bedtime  metoprolol tartrate 50 milliGRAM(s) Oral daily  metroNIDAZOLE    Tablet 500 milliGRAM(s) Oral every 12 hours  octreotide  Injectable 100 MICROGram(s) SubCutaneous daily  pantoprazole  Injectable 40 milliGRAM(s) IV Push every 24 hours  risperiDONE   Tablet 2 milliGRAM(s) Oral at bedtime    MEDICATIONS  (PRN):  sodium chloride 0.9% lock flush 10 milliLiter(s) IV Push every 1 hour PRN Pre/post blood products, medications, blood draw, and to maintain line patency      PHYSICAL EXAM:  Constitutional: A&Ox3, NAD  Respiratory: Unlabored breathing  Abdomen: Soft, nondistended, appropriately tender, MARGRET drain w/ milky, purulent output  Extremities: WWP, VENEGAS spontaneously    LABS:                        9.4    11.91 )-----------( 537      ( 15 Oct 2022 05:53 )             29.5     10-14    135  |  102  |  9   ----------------------------<  103<H>  4.2   |  26  |  0.58    Ca    8.2<L>      14 Oct 2022 05:20  Phos  3.0     10-14  Mg     2.00     10-14

## 2022-10-16 LAB
ALBUMIN SERPL ELPH-MCNC: 2.1 G/DL — LOW (ref 3.3–5)
ALP SERPL-CCNC: 276 U/L — HIGH (ref 40–120)
ALT FLD-CCNC: 11 U/L — SIGNIFICANT CHANGE UP (ref 4–33)
AMYLASE FLD-CCNC: 16 U/L — SIGNIFICANT CHANGE UP
ANION GAP SERPL CALC-SCNC: 8 MMOL/L — SIGNIFICANT CHANGE UP (ref 7–14)
AST SERPL-CCNC: 13 U/L — SIGNIFICANT CHANGE UP (ref 4–32)
BILIRUB SERPL-MCNC: 0.2 MG/DL — SIGNIFICANT CHANGE UP (ref 0.2–1.2)
BUN SERPL-MCNC: 8 MG/DL — SIGNIFICANT CHANGE UP (ref 7–23)
CALCIUM SERPL-MCNC: 7.9 MG/DL — LOW (ref 8.4–10.5)
CHLORIDE SERPL-SCNC: 104 MMOL/L — SIGNIFICANT CHANGE UP (ref 98–107)
CO2 SERPL-SCNC: 25 MMOL/L — SIGNIFICANT CHANGE UP (ref 22–31)
CREAT SERPL-MCNC: 0.56 MG/DL — SIGNIFICANT CHANGE UP (ref 0.5–1.3)
EGFR: 97 ML/MIN/1.73M2 — SIGNIFICANT CHANGE UP
GLUCOSE BLDC GLUCOMTR-MCNC: 106 MG/DL — HIGH (ref 70–99)
GLUCOSE BLDC GLUCOMTR-MCNC: 114 MG/DL — HIGH (ref 70–99)
GLUCOSE BLDC GLUCOMTR-MCNC: 134 MG/DL — HIGH (ref 70–99)
GLUCOSE BLDC GLUCOMTR-MCNC: 186 MG/DL — HIGH (ref 70–99)
GLUCOSE SERPL-MCNC: 120 MG/DL — HIGH (ref 70–99)
HCT VFR BLD CALC: 27.5 % — LOW (ref 34.5–45)
HGB BLD-MCNC: 8.6 G/DL — LOW (ref 11.5–15.5)
MAGNESIUM SERPL-MCNC: 1.8 MG/DL — SIGNIFICANT CHANGE UP (ref 1.6–2.6)
MCHC RBC-ENTMCNC: 28 PG — SIGNIFICANT CHANGE UP (ref 27–34)
MCHC RBC-ENTMCNC: 31.3 GM/DL — LOW (ref 32–36)
MCV RBC AUTO: 89.6 FL — SIGNIFICANT CHANGE UP (ref 80–100)
NRBC # BLD: 0 /100 WBCS — SIGNIFICANT CHANGE UP (ref 0–0)
NRBC # FLD: 0 K/UL — SIGNIFICANT CHANGE UP (ref 0–0)
PHOSPHATE SERPL-MCNC: 2.2 MG/DL — LOW (ref 2.5–4.5)
PLATELET # BLD AUTO: 465 K/UL — HIGH (ref 150–400)
POTASSIUM SERPL-MCNC: 4 MMOL/L — SIGNIFICANT CHANGE UP (ref 3.5–5.3)
POTASSIUM SERPL-SCNC: 4 MMOL/L — SIGNIFICANT CHANGE UP (ref 3.5–5.3)
PROT SERPL-MCNC: 5.1 G/DL — LOW (ref 6–8.3)
RBC # BLD: 3.07 M/UL — LOW (ref 3.8–5.2)
RBC # FLD: 13.4 % — SIGNIFICANT CHANGE UP (ref 10.3–14.5)
SODIUM SERPL-SCNC: 137 MMOL/L — SIGNIFICANT CHANGE UP (ref 135–145)
WBC # BLD: 14.69 K/UL — HIGH (ref 3.8–10.5)
WBC # FLD AUTO: 14.69 K/UL — HIGH (ref 3.8–10.5)

## 2022-10-16 PROCEDURE — 74177 CT ABD & PELVIS W/CONTRAST: CPT | Mod: 26

## 2022-10-16 PROCEDURE — 93970 EXTREMITY STUDY: CPT | Mod: 26

## 2022-10-16 RX ORDER — ERTAPENEM SODIUM 1 G/1
1000 INJECTION, POWDER, LYOPHILIZED, FOR SOLUTION INTRAMUSCULAR; INTRAVENOUS ONCE
Refills: 0 | Status: COMPLETED | OUTPATIENT
Start: 2022-10-16 | End: 2022-10-16

## 2022-10-16 RX ORDER — MAGNESIUM SULFATE 500 MG/ML
2 VIAL (ML) INJECTION ONCE
Refills: 0 | Status: COMPLETED | OUTPATIENT
Start: 2022-10-16 | End: 2022-10-16

## 2022-10-16 RX ORDER — CEFTRIAXONE 500 MG/1
INJECTION, POWDER, FOR SOLUTION INTRAMUSCULAR; INTRAVENOUS
Refills: 0 | Status: DISCONTINUED | OUTPATIENT
Start: 2022-10-16 | End: 2022-10-16

## 2022-10-16 RX ORDER — CEFTRIAXONE 500 MG/1
1000 INJECTION, POWDER, FOR SOLUTION INTRAMUSCULAR; INTRAVENOUS ONCE
Refills: 0 | Status: DISCONTINUED | OUTPATIENT
Start: 2022-10-16 | End: 2022-10-16

## 2022-10-16 RX ORDER — ERTAPENEM SODIUM 1 G/1
INJECTION, POWDER, LYOPHILIZED, FOR SOLUTION INTRAMUSCULAR; INTRAVENOUS
Refills: 0 | Status: DISCONTINUED | OUTPATIENT
Start: 2022-10-16 | End: 2022-10-19

## 2022-10-16 RX ORDER — KETOROLAC TROMETHAMINE 30 MG/ML
15 SYRINGE (ML) INJECTION ONCE
Refills: 0 | Status: DISCONTINUED | OUTPATIENT
Start: 2022-10-16 | End: 2022-10-16

## 2022-10-16 RX ORDER — ERTAPENEM SODIUM 1 G/1
1000 INJECTION, POWDER, LYOPHILIZED, FOR SOLUTION INTRAMUSCULAR; INTRAVENOUS EVERY 24 HOURS
Refills: 0 | Status: DISCONTINUED | OUTPATIENT
Start: 2022-10-17 | End: 2022-10-19

## 2022-10-16 RX ADMIN — ENOXAPARIN SODIUM 40 MILLIGRAM(S): 100 INJECTION SUBCUTANEOUS at 11:13

## 2022-10-16 RX ADMIN — Medication 1 DROP(S): at 17:43

## 2022-10-16 RX ADMIN — Medication 975 MILLIGRAM(S): at 17:43

## 2022-10-16 RX ADMIN — Medication 500 MILLIGRAM(S): at 00:33

## 2022-10-16 RX ADMIN — Medication 975 MILLIGRAM(S): at 11:11

## 2022-10-16 RX ADMIN — OCTREOTIDE ACETATE 50 MICROGRAM(S): 200 INJECTION, SOLUTION INTRAVENOUS; SUBCUTANEOUS at 11:12

## 2022-10-16 RX ADMIN — Medication 85 MILLIMOLE(S): at 13:09

## 2022-10-16 RX ADMIN — PANTOPRAZOLE SODIUM 40 MILLIGRAM(S): 20 TABLET, DELAYED RELEASE ORAL at 11:12

## 2022-10-16 RX ADMIN — Medication 2: at 18:16

## 2022-10-16 RX ADMIN — Medication 975 MILLIGRAM(S): at 00:34

## 2022-10-16 RX ADMIN — INSULIN GLARGINE 9 UNIT(S): 100 INJECTION, SOLUTION SUBCUTANEOUS at 22:30

## 2022-10-16 RX ADMIN — Medication 25 GRAM(S): at 11:10

## 2022-10-16 RX ADMIN — Medication 975 MILLIGRAM(S): at 01:07

## 2022-10-16 RX ADMIN — RISPERIDONE 2 MILLIGRAM(S): 4 TABLET ORAL at 22:31

## 2022-10-16 RX ADMIN — SODIUM CHLORIDE 75 MILLILITER(S): 9 INJECTION, SOLUTION INTRAVENOUS at 23:34

## 2022-10-16 RX ADMIN — Medication 975 MILLIGRAM(S): at 23:34

## 2022-10-16 RX ADMIN — Medication 50 MILLIGRAM(S): at 11:13

## 2022-10-16 RX ADMIN — Medication 15 MILLIGRAM(S): at 04:39

## 2022-10-16 RX ADMIN — ERTAPENEM SODIUM 120 MILLIGRAM(S): 1 INJECTION, POWDER, LYOPHILIZED, FOR SOLUTION INTRAMUSCULAR; INTRAVENOUS at 11:10

## 2022-10-16 RX ADMIN — Medication 1 DROP(S): at 07:13

## 2022-10-16 RX ADMIN — Medication 15 MILLIGRAM(S): at 04:09

## 2022-10-16 NOTE — PROVIDER CONTACT NOTE (OTHER) - ASSESSMENT
Patient with an episode of emisis. Emisis thin and dark brown. Complaining of nausea. Denies abdominal pain.
Pt. A&Ox4. Pt not having sweats or chills, pt. calm and in bed.
The patient is a hard stick I was unable to draw the morning labs and the PCA could not draw them either

## 2022-10-16 NOTE — PROVIDER CONTACT NOTE (OTHER) - ACTION/TREATMENT ORDERED:
Patient to be assessed at bedside during team rounding. Hold oral medications until further assessment.
Pt. given more juice, snacks, and cookies, blood glucose retaken as per recommendations and went up to 94. No new orders given afterwards.

## 2022-10-16 NOTE — PROGRESS NOTE ADULT - SUBJECTIVE AND OBJECTIVE BOX
Subjective: I vomited again this morning but I have no pain and I did not have any nausea.    Objective:   Vital Signs Last 24 Hrs  T(C): 37 (16 Oct 2022 11:06), Max: 37.2 (15 Oct 2022 11:51)  T(F): 98.6 (16 Oct 2022 11:06), Max: 99 (15 Oct 2022 11:51)  HR: 91 (16 Oct 2022 11:06) (73 - 91)  BP: 122/40 (16 Oct 2022 11:06) (105/41 - 151/63)  BP(mean): --  RR: 17 (16 Oct 2022 11:06) (16 - 18)  SpO2: 100% (16 Oct 2022 11:06) (97% - 100%)    Parameters below as of 16 Oct 2022 11:06  Patient On (Oxygen Delivery Method): room air        Daily     Daily     Heent: N/C, AT PER no scleral icterus, No JVD  Pul: clear  Cor: RRR  Abdomen: soft, normal BS. Wound - clean - Rod drain with scant output/24 hours - still looks purulent.  Extremities: with pre-tibial edema more on the right than the left., motor/sensory intact    I&O's Detail    15 Oct 2022 07:01  -  16 Oct 2022 07:00  --------------------------------------------------------  IN:    dextrose 5% + sodium chloride 0.9%: 1800 mL    Oral Fluid: 320 mL  Total IN: 2120 mL    OUT:    Drain (mL): 12 mL    Voided (mL): 1400 mL  Total OUT: 1412 mL    Total NET: 708 mL      16 Oct 2022 07:01  -  16 Oct 2022 11:22  --------------------------------------------------------  IN:  Total IN: 0 mL    OUT:    Drain (mL): 0 mL  Total OUT: 0 mL    Total NET: 0 mL          MEDICATIONS  (STANDING):  acetaminophen     Tablet .. 975 milliGRAM(s) Oral every 6 hours  artificial  tears Solution 1 Drop(s) Both EYES two times a day  dextrose 5% + sodium chloride 0.9%. 1000 milliLiter(s) (75 mL/Hr) IV Continuous <Continuous>  enoxaparin Injectable 40 milliGRAM(s) SubCutaneous every 24 hours  ertapenem  IVPB      insulin glargine Injectable (LANTUS) 9 Unit(s) SubCutaneous at bedtime  insulin lispro (ADMELOG) corrective regimen sliding scale   SubCutaneous Before meals and at bedtime  metoprolol tartrate 50 milliGRAM(s) Oral daily  octreotide  Injectable 50 MICROGram(s) SubCutaneous daily  pantoprazole  Injectable 40 milliGRAM(s) IV Push every 24 hours  risperiDONE   Tablet 2 milliGRAM(s) Oral at bedtime  sodium phosphate IVPB 30 milliMole(s) IV Intermittent once    MEDICATIONS  (PRN):  sodium chloride 0.9% lock flush 10 milliLiter(s) IV Push every 1 hour PRN Pre/post blood products, medications, blood draw, and to maintain line patency                            8.6    14.69 )-----------( 465      ( 16 Oct 2022 05:30 )             27.5     10-16    137  |  104  |  8   ----------------------------<  120<H>  4.0   |  25  |  0.56    Ca    7.9<L>      16 Oct 2022 05:30  Phos  2.2     10-16  Mg     1.80     10-16    TPro  5.1<L>  /  Alb  2.1<L>  /  TBili  0.2  /  DBili  x   /  AST  13  /  ALT  11  /  AlkPhos  276<H>  10-16        Radiologic Studies:

## 2022-10-16 NOTE — PROGRESS NOTE ADULT - SUBJECTIVE AND OBJECTIVE BOX
A Team (General Surgery) Daily Progress Note    SUBJECTIVE:  Pt seen and examined, and is resting comfortably in bed. No acute events overnight. Pain is adequately controlled on current regimen. Pt has no complaints at this time. +/+. Pt had one episode of emesis this morning. Pt reports nausea improved.    OBJECTIVE:  Vital Signs Last 24 Hrs  T(C): 36.7 (16 Oct 2022 04:00), Max: 37.2 (15 Oct 2022 11:51)  T(F): 98.1 (16 Oct 2022 04:00), Max: 99 (15 Oct 2022 11:51)  HR: 78 (16 Oct 2022 04:00) (73 - 84)  BP: 139/49 (16 Oct 2022 04:00) (105/41 - 151/63)  BP(mean): --  RR: 18 (16 Oct 2022 04:00) (16 - 18)  SpO2: 99% (16 Oct 2022 04:00) (97% - 100%)    Parameters below as of 16 Oct 2022 04:00  Patient On (Oxygen Delivery Method): room air        I&O's Detail    15 Oct 2022 07:01  -  16 Oct 2022 07:00  --------------------------------------------------------  IN:    dextrose 5% + sodium chloride 0.9%: 1800 mL    Oral Fluid: 320 mL  Total IN: 2120 mL    OUT:    Drain (mL): 12 mL    Voided (mL): 1400 mL  Total OUT: 1412 mL    Total NET: 708 mL        Exam:  Constitutional: A&Ox3, NAD  Respiratory: Unlabored breathing  Abdomen: Soft, nondistended, appropriately tender, MARGRET drain w/ milky, purulent output  Extremities: WWP, VENEGAS spontaneously                        8.6    14.69 )-----------( 465      ( 16 Oct 2022 05:30 )             27.5       10-16    137  |  104  |  8   ----------------------------<  120<H>  4.0   |  25  |  0.56    Ca    7.9<L>      16 Oct 2022 05:30  Phos  2.2     10-16  Mg     1.80     10-16    TPro  5.1<L>  /  Alb  2.1<L>  /  TBili  0.2  /  DBili  x   /  AST  13  /  ALT  11  /  AlkPhos  276<H>  10-16          ASSESSMENT:  Patient is a 72 year old female with a PMHx of DM2, anxiety, depression and HTN who presented to pre-surgical testing with diagnosis of pancreatic cancer.  Patient is now S/P ex-lap and Whipple on 9/23/22.      PLAN:  - c/w CLD  -decreased octreotide to 50mg QD. Continue for one more day.  - Monitor MARGRET drain output, amylase daily  - D/c Flagyl/Ceftriaxone  - Started Ertapenem  - Out of bed  - Pain control  - PT rec outpatient PT  - VTE prophylaxis with Lovenox subcutaneous      A Team Surgery  pager: [phone]

## 2022-10-16 NOTE — PROGRESS NOTE ADULT - ASSESSMENT
S/P Whipple procedure for Stage III adenocarcinoma of the head of pancreas.  Repeated bouts of vomiting.  Elevated WBC  Edema of right lower extremity

## 2022-10-17 LAB
ALBUMIN SERPL ELPH-MCNC: 2.2 G/DL — LOW (ref 3.3–5)
ALP SERPL-CCNC: 305 U/L — HIGH (ref 40–120)
ALT FLD-CCNC: 10 U/L — SIGNIFICANT CHANGE UP (ref 4–33)
AMYLASE FLD-CCNC: 13 U/L — SIGNIFICANT CHANGE UP
ANION GAP SERPL CALC-SCNC: 7 MMOL/L — SIGNIFICANT CHANGE UP (ref 7–14)
APPEARANCE UR: CLEAR — SIGNIFICANT CHANGE UP
AST SERPL-CCNC: 19 U/L — SIGNIFICANT CHANGE UP (ref 4–32)
BILIRUB SERPL-MCNC: 0.3 MG/DL — SIGNIFICANT CHANGE UP (ref 0.2–1.2)
BILIRUB UR-MCNC: NEGATIVE — SIGNIFICANT CHANGE UP
BUN SERPL-MCNC: 5 MG/DL — LOW (ref 7–23)
CALCIUM SERPL-MCNC: 8.5 MG/DL — SIGNIFICANT CHANGE UP (ref 8.4–10.5)
CHLORIDE SERPL-SCNC: 102 MMOL/L — SIGNIFICANT CHANGE UP (ref 98–107)
CO2 SERPL-SCNC: 28 MMOL/L — SIGNIFICANT CHANGE UP (ref 22–31)
COLOR SPEC: SIGNIFICANT CHANGE UP
CREAT SERPL-MCNC: 0.51 MG/DL — SIGNIFICANT CHANGE UP (ref 0.5–1.3)
DIFF PNL FLD: NEGATIVE — SIGNIFICANT CHANGE UP
EGFR: 99 ML/MIN/1.73M2 — SIGNIFICANT CHANGE UP
GLUCOSE BLDC GLUCOMTR-MCNC: 120 MG/DL — HIGH (ref 70–99)
GLUCOSE BLDC GLUCOMTR-MCNC: 137 MG/DL — HIGH (ref 70–99)
GLUCOSE BLDC GLUCOMTR-MCNC: 140 MG/DL — HIGH (ref 70–99)
GLUCOSE BLDC GLUCOMTR-MCNC: 148 MG/DL — HIGH (ref 70–99)
GLUCOSE SERPL-MCNC: 118 MG/DL — HIGH (ref 70–99)
GLUCOSE UR QL: NEGATIVE — SIGNIFICANT CHANGE UP
HCT VFR BLD CALC: 27.1 % — LOW (ref 34.5–45)
HGB BLD-MCNC: 8.8 G/DL — LOW (ref 11.5–15.5)
KETONES UR-MCNC: NEGATIVE — SIGNIFICANT CHANGE UP
LEUKOCYTE ESTERASE UR-ACNC: NEGATIVE — SIGNIFICANT CHANGE UP
MAGNESIUM SERPL-MCNC: 2 MG/DL — SIGNIFICANT CHANGE UP (ref 1.6–2.6)
MCHC RBC-ENTMCNC: 28.2 PG — SIGNIFICANT CHANGE UP (ref 27–34)
MCHC RBC-ENTMCNC: 32.5 GM/DL — SIGNIFICANT CHANGE UP (ref 32–36)
MCV RBC AUTO: 86.9 FL — SIGNIFICANT CHANGE UP (ref 80–100)
NITRITE UR-MCNC: NEGATIVE — SIGNIFICANT CHANGE UP
NRBC # BLD: 0 /100 WBCS — SIGNIFICANT CHANGE UP (ref 0–0)
NRBC # FLD: 0 K/UL — SIGNIFICANT CHANGE UP (ref 0–0)
PH UR: 6 — SIGNIFICANT CHANGE UP (ref 5–8)
PHOSPHATE SERPL-MCNC: 2.7 MG/DL — SIGNIFICANT CHANGE UP (ref 2.5–4.5)
PLATELET # BLD AUTO: 517 K/UL — HIGH (ref 150–400)
POTASSIUM SERPL-MCNC: 3.9 MMOL/L — SIGNIFICANT CHANGE UP (ref 3.5–5.3)
POTASSIUM SERPL-SCNC: 3.9 MMOL/L — SIGNIFICANT CHANGE UP (ref 3.5–5.3)
PROT SERPL-MCNC: 5.4 G/DL — LOW (ref 6–8.3)
PROT UR-MCNC: ABNORMAL
RBC # BLD: 3.12 M/UL — LOW (ref 3.8–5.2)
RBC # FLD: 13.5 % — SIGNIFICANT CHANGE UP (ref 10.3–14.5)
SODIUM SERPL-SCNC: 137 MMOL/L — SIGNIFICANT CHANGE UP (ref 135–145)
SP GR SPEC: 1.02 — SIGNIFICANT CHANGE UP (ref 1.01–1.05)
UROBILINOGEN FLD QL: SIGNIFICANT CHANGE UP
WBC # BLD: 15.42 K/UL — HIGH (ref 3.8–10.5)
WBC # FLD AUTO: 15.42 K/UL — HIGH (ref 3.8–10.5)

## 2022-10-17 PROCEDURE — 99232 SBSQ HOSP IP/OBS MODERATE 35: CPT

## 2022-10-17 RX ADMIN — Medication 975 MILLIGRAM(S): at 18:01

## 2022-10-17 RX ADMIN — ENOXAPARIN SODIUM 40 MILLIGRAM(S): 100 INJECTION SUBCUTANEOUS at 11:22

## 2022-10-17 RX ADMIN — INSULIN GLARGINE 9 UNIT(S): 100 INJECTION, SOLUTION SUBCUTANEOUS at 22:48

## 2022-10-17 RX ADMIN — Medication 975 MILLIGRAM(S): at 17:23

## 2022-10-17 RX ADMIN — Medication 1 DROP(S): at 17:23

## 2022-10-17 RX ADMIN — RISPERIDONE 2 MILLIGRAM(S): 4 TABLET ORAL at 22:49

## 2022-10-17 RX ADMIN — ERTAPENEM SODIUM 120 MILLIGRAM(S): 1 INJECTION, POWDER, LYOPHILIZED, FOR SOLUTION INTRAMUSCULAR; INTRAVENOUS at 11:23

## 2022-10-17 RX ADMIN — Medication 1 DROP(S): at 05:29

## 2022-10-17 RX ADMIN — Medication 975 MILLIGRAM(S): at 05:29

## 2022-10-17 RX ADMIN — SODIUM CHLORIDE 75 MILLILITER(S): 9 INJECTION, SOLUTION INTRAVENOUS at 09:41

## 2022-10-17 RX ADMIN — Medication 975 MILLIGRAM(S): at 11:16

## 2022-10-17 RX ADMIN — PANTOPRAZOLE SODIUM 40 MILLIGRAM(S): 20 TABLET, DELAYED RELEASE ORAL at 11:28

## 2022-10-17 RX ADMIN — Medication 50 MILLIGRAM(S): at 05:29

## 2022-10-17 RX ADMIN — Medication 975 MILLIGRAM(S): at 12:03

## 2022-10-17 NOTE — PROGRESS NOTE ADULT - ASSESSMENT
73 y/o female presents with diagnosis of pancreatic cancer on endoscopy biopsy. Subsequent CT scan and PET scan confirm pathology. S/p whipple procedure pancreatic cancer with purulent drainage from abd drain and E.coli and strep anginosus in cx and PCN allergy    Keaton Grimes  Attending Physician   Division of Infectious Disease  Office #[phone]  Available on [employer] Teams also  After 5pm/weekend or no response, call #[phone]

## 2022-10-17 NOTE — PROGRESS NOTE ADULT - SUBJECTIVE AND OBJECTIVE BOX
RUBIN HO 72y [id number]    Patient is a 72y old  Female who presents with a chief complaint of Pancreatic carcinoma (16 Oct 2022 11:22)      Follow Up/CC:  ID following for abd collection    Interval History/ROS: no fever    Allergies    penicillin (Other)    Intolerances        ANTIMICROBIALS:  ertapenem  IVPB 1000 every 24 hours  ertapenem  IVPB        MEDICATIONS  (STANDING):  acetaminophen     Tablet .. 975 milliGRAM(s) Oral every 6 hours  artificial  tears Solution 1 Drop(s) Both EYES two times a day  dextrose 5% + sodium chloride 0.9%. 1000 milliLiter(s) (75 mL/Hr) IV Continuous <Continuous>  enoxaparin Injectable 40 milliGRAM(s) SubCutaneous every 24 hours  ertapenem  IVPB      ertapenem  IVPB 1000 milliGRAM(s) IV Intermittent every 24 hours  insulin glargine Injectable (LANTUS) 9 Unit(s) SubCutaneous at bedtime  insulin lispro (ADMELOG) corrective regimen sliding scale   SubCutaneous Before meals and at bedtime  metoprolol tartrate 50 milliGRAM(s) Oral daily  pantoprazole  Injectable 40 milliGRAM(s) IV Push every 24 hours  risperiDONE   Tablet 2 milliGRAM(s) Oral at bedtime    MEDICATIONS  (PRN):  aluminum hydroxide/magnesium hydroxide/simethicone Suspension 30 milliLiter(s) Oral every 4 hours PRN Dyspepsia  sodium chloride 0.9% lock flush 10 milliLiter(s) IV Push every 1 hour PRN Pre/post blood products, medications, blood draw, and to maintain line patency        Vital Signs Last 24 Hrs  T(C): 36.8 (17 Oct 2022 12:00), Max: 37.3 (16 Oct 2022 23:30)  T(F): 98.3 (17 Oct 2022 12:00), Max: 99.1 (16 Oct 2022 23:30)  HR: 80 (17 Oct 2022 12:00) (72 - 94)  BP: 131/50 (17 Oct 2022 12:00) (125/56 - 146/60)  BP(mean): --  RR: 18 (17 Oct 2022 12:00) (17 - 19)  SpO2: 98% (17 Oct 2022 12:00) (97% - 99%)    Parameters below as of 17 Oct 2022 12:00  Patient On (Oxygen Delivery Method): room air        CBC Full  -  ( 17 Oct 2022 05:50 )  WBC Count : 15.42 K/uL  RBC Count : 3.12 M/uL  Hemoglobin : 8.8 g/dL  Hematocrit : 27.1 %  Platelet Count - Automated : 517 K/uL  Mean Cell Volume : 86.9 fL  Mean Cell Hemoglobin : 28.2 pg  Mean Cell Hemoglobin Concentration : 32.5 gm/dL  Auto Neutrophil # : x  Auto Lymphocyte # : x  Auto Monocyte # : x  Auto Eosinophil # : x  Auto Basophil # : x  Auto Neutrophil % : x  Auto Lymphocyte % : x  Auto Monocyte % : x  Auto Eosinophil % : x  Auto Basophil % : x    10-17    137  |  102  |  5<L>  ----------------------------<  118<H>  3.9   |  28  |  0.51    Ca    8.5      17 Oct 2022 05:50  Phos  2.7     10-17  Mg     2.00     10-17    TPro  5.4<L>  /  Alb  2.2<L>  /  TBili  0.3  /  DBili  x   /  AST  19  /  ALT  10  /  AlkPhos  305<H>  10-17    LIVER FUNCTIONS - ( 17 Oct 2022 05:50 )  Alb: 2.2 g/dL / Pro: 5.4 g/dL / ALK PHOS: 305 U/L / ALT: 10 U/L / AST: 19 U/L / GGT: x           Urinalysis Basic - ( 17 Oct 2022 08:25 )    Color: Light Yellow / Appearance: Clear / S.018 / pH: x  Gluc: x / Ketone: Negative  / Bili: Negative / Urobili: <2 mg/dL   Blood: x / Protein: Trace / Nitrite: Negative   Leuk Esterase: Negative / RBC: x / WBC x   Sq Epi: x / Non Sq Epi: x / Bacteria: x        MICROBIOLOGY:  Abdominal Fl Abdominal Fluid  10-12-22   Moderate Escherichia coli  Moderate Streptococcus anginosus "Susceptibilities not performed"  Numerous Prevotella buccae "Susceptibilities not performed"  --  Escherichia coli      Abdominal Fl Abdominal Fluid  10-01-22   No growth at 5 days  --    polymorphonuclear leukocytes seen  No organisms seen  by cytocentrifuge      Abdominal Fl Abdominal Fluid  10-01-22   Rare Streptococcus anginosus "Susceptibilities not performed"  --    No polymorphonuclear cells seen  No organisms seen  by cytocentrifuge      .Blood Blood  22   No Growth Final  --  --      .Blood Blood  22   No Growth Final  --  --        RADIOLOGY    < from: CT Abdomen and Pelvis w/ Oral Cont and w/ IV Cont (10.16.22 @ 14:45) >  Status post Whipple procedure. Peripancreatic collections are slightly   decreased from prior. No new collection.  Bilateral pleural effusions with bilateral lower lobe atelectasis.    < end of copied text >

## 2022-10-17 NOTE — PROGRESS NOTE ADULT - GASTROINTESTINAL
details… midline incision clean with left side drain with purulent fluid/soft/nontender/nondistended

## 2022-10-17 NOTE — PROGRESS NOTE ADULT - ASSESSMENT
ASSESSMENT:  Patient is a 72 year old female with a PMHx of DM2, anxiety, depression and HTN who presented to pre-surgical testing with diagnosis of pancreatic cancer.  Patient is now S/P ex-lap and Whipple on 9/23/22.      PLAN:  - c/w CLD  - D/C Octretide due to low Amylase levels.  - UA  - Monitor MARGRET drain output, amylase daily  - D/c Flagyl/Ceftriaxone  - Started Ertapenem  - Out of bed  - Pain control  - PT rec outpatient PT  - VTE prophylaxis with Lovenox subcutaneous      A Team Surgery  pager: [phone]

## 2022-10-17 NOTE — PROGRESS NOTE ADULT - SUBJECTIVE AND OBJECTIVE BOX
A Team (General Surgery) Daily Progress Note    SUBJECTIVE:  Pt seen and examined during AM rounds. Patient states pain is controlled and has no complaints at this time. Patient denies nausea, vomiting, chest pain and SOB.    OBJECTIVE:  Vital Signs Last 24 Hrs  T(C): 36.9 (17 Oct 2022 05:20), Max: 37.3 (16 Oct 2022 23:30)  T(F): 98.4 (17 Oct 2022 05:20), Max: 99.1 (16 Oct 2022 23:30)  HR: 92 (17 Oct 2022 05:20) (72 - 94)  BP: 146/60 (17 Oct 2022 05:20) (122/40 - 146/60)  BP(mean): --  RR: 19 (17 Oct 2022 05:20) (17 - 19)  SpO2: 98% (17 Oct 2022 05:20) (98% - 100%)    Parameters below as of 17 Oct 2022 05:20  Patient On (Oxygen Delivery Method): room air    I&O's Detail    16 Oct 2022 07:01  -  17 Oct 2022 07:00  --------------------------------------------------------  IN:  Total IN: 0 mL    OUT:    Drain (mL): 20 mL    Voided (mL): 1500 mL  Total OUT: 1520 mL    Total NET: -1520 mL      Exam:  Constitutional: A&Ox3, NAD  Respiratory: Unlabored breathing  Abdomen: Soft, nondistended, appropriately tender, left CARLIN drain w/ milky, purulent output, right old carlin site purulent.  Extremities: WWP, VENEGAS spontaneously, bilateral pitting edema                        8.6    14.69 )-----------( 465      ( 16 Oct 2022 05:30 )             27.5       10-16    137  |  104  |  8   ----------------------------<  120<H>  4.0   |  25  |  0.56    Ca    7.9<L>      16 Oct 2022 05:30  Phos  2.2     10-16  Mg     1.80     10-16    TPro  5.1<L>  /  Alb  2.1<L>  /  TBili  0.2  /  DBili  x   /  AST  13  /  ALT  11  /  AlkPhos  276<H>  10-16

## 2022-10-17 NOTE — PROGRESS NOTE ADULT - SUBJECTIVE AND OBJECTIVE BOX
Surgery Progress Note     Patient is a 72y old  Female who presents with a chief complaint of Pancreatic carcinoma (16 Oct 2022 11:22)      HPI:  This is a 71 y/o female whose native language is [place] Creole; comprehends and verbalizes minimal English. Refused hospital ; requested sister Mariaa function as . Patient   presents with diagnosis of pancreatic cancer on endoscopy biopsy. Subsequent CT scan and PET scan confirm pathology. Scheduled for whipple procedure pancreatic cancer (20 Sep 2022 07:10)      PAST MEDICAL & SURGICAL HISTORY:  Language barrier  native language [place] Creole; comprehends and verbzlizes minimal English      Type 2 diabetes mellitus      Anxiety and depression      Hypertension      Pancreatic cancer  September 2022      Uterine fibroid  hysterectomyu 1988      H/O jaundice  biliary stent in place          Physical Exam:    Vital Signs Last 24 Hrs  T(C): 36.8 (17 Oct 2022 08:00), Max: 37.3 (16 Oct 2022 23:30)  T(F): 98.3 (17 Oct 2022 08:00), Max: 99.1 (16 Oct 2022 23:30)  HR: 79 (17 Oct 2022 08:00) (72 - 94)  BP: 138/52 (17 Oct 2022 08:00) (125/56 - 146/60)  BP(mean): --  RR: 18 (17 Oct 2022 08:00) (17 - 19)  SpO2: 97% (17 Oct 2022 08:00) (97% - 99%)    Parameters below as of 17 Oct 2022 08:00  Patient On (Oxygen Delivery Method): room air        General appearance:  Appears very comfortable . Eating without problem . NO PAIN         Drainage  < 20 CC    Labs:                          8.8    15.42 )-----------( 517      ( 17 Oct 2022 05:50 )             27.1     10-17    137  |  102  |  5<L>  ----------------------------<  118<H>  3.9   |  28  |  0.51    Ca    8.5      17 Oct 2022 05:50  Phos  2.7     10-17  Mg     2.00     10-17    TPro  5.4<L>  /  Alb  2.2<L>  /  TBili  0.3  /  DBili  x   /  AST  19  /  ALT  10  /  AlkPhos  305<H>  10-17          Assessment and Plan:

## 2022-10-18 LAB
ANION GAP SERPL CALC-SCNC: 9 MMOL/L — SIGNIFICANT CHANGE UP (ref 7–14)
BUN SERPL-MCNC: 6 MG/DL — LOW (ref 7–23)
CALCIUM SERPL-MCNC: 8.4 MG/DL — SIGNIFICANT CHANGE UP (ref 8.4–10.5)
CHLORIDE SERPL-SCNC: 104 MMOL/L — SIGNIFICANT CHANGE UP (ref 98–107)
CO2 SERPL-SCNC: 27 MMOL/L — SIGNIFICANT CHANGE UP (ref 22–31)
CREAT SERPL-MCNC: 0.5 MG/DL — SIGNIFICANT CHANGE UP (ref 0.5–1.3)
EGFR: 100 ML/MIN/1.73M2 — SIGNIFICANT CHANGE UP
GLUCOSE BLDC GLUCOMTR-MCNC: 103 MG/DL — HIGH (ref 70–99)
GLUCOSE BLDC GLUCOMTR-MCNC: 86 MG/DL — SIGNIFICANT CHANGE UP (ref 70–99)
GLUCOSE BLDC GLUCOMTR-MCNC: 88 MG/DL — SIGNIFICANT CHANGE UP (ref 70–99)
GLUCOSE BLDC GLUCOMTR-MCNC: 96 MG/DL — SIGNIFICANT CHANGE UP (ref 70–99)
GLUCOSE SERPL-MCNC: 63 MG/DL — LOW (ref 70–99)
HCT VFR BLD CALC: 27.5 % — LOW (ref 34.5–45)
HGB BLD-MCNC: 8.8 G/DL — LOW (ref 11.5–15.5)
MAGNESIUM SERPL-MCNC: 2.1 MG/DL — SIGNIFICANT CHANGE UP (ref 1.6–2.6)
MCHC RBC-ENTMCNC: 27.8 PG — SIGNIFICANT CHANGE UP (ref 27–34)
MCHC RBC-ENTMCNC: 32 GM/DL — SIGNIFICANT CHANGE UP (ref 32–36)
MCV RBC AUTO: 87 FL — SIGNIFICANT CHANGE UP (ref 80–100)
NRBC # BLD: 0 /100 WBCS — SIGNIFICANT CHANGE UP (ref 0–0)
NRBC # FLD: 0 K/UL — SIGNIFICANT CHANGE UP (ref 0–0)
PHOSPHATE SERPL-MCNC: 2.8 MG/DL — SIGNIFICANT CHANGE UP (ref 2.5–4.5)
PLATELET # BLD AUTO: 551 K/UL — HIGH (ref 150–400)
POTASSIUM SERPL-MCNC: 4.5 MMOL/L — SIGNIFICANT CHANGE UP (ref 3.5–5.3)
POTASSIUM SERPL-SCNC: 4.5 MMOL/L — SIGNIFICANT CHANGE UP (ref 3.5–5.3)
RBC # BLD: 3.16 M/UL — LOW (ref 3.8–5.2)
RBC # FLD: 13.5 % — SIGNIFICANT CHANGE UP (ref 10.3–14.5)
SARS-COV-2 RNA SPEC QL NAA+PROBE: SIGNIFICANT CHANGE UP
SODIUM SERPL-SCNC: 140 MMOL/L — SIGNIFICANT CHANGE UP (ref 135–145)
WBC # BLD: 12.27 K/UL — HIGH (ref 3.8–10.5)
WBC # FLD AUTO: 12.27 K/UL — HIGH (ref 3.8–10.5)

## 2022-10-18 PROCEDURE — 71045 X-RAY EXAM CHEST 1 VIEW: CPT | Mod: 26

## 2022-10-18 PROCEDURE — 99232 SBSQ HOSP IP/OBS MODERATE 35: CPT

## 2022-10-18 RX ORDER — FUROSEMIDE 40 MG
20 TABLET ORAL ONCE
Refills: 0 | Status: COMPLETED | OUTPATIENT
Start: 2022-10-18 | End: 2022-10-18

## 2022-10-18 RX ADMIN — ENOXAPARIN SODIUM 40 MILLIGRAM(S): 100 INJECTION SUBCUTANEOUS at 11:45

## 2022-10-18 RX ADMIN — Medication 1 DROP(S): at 07:01

## 2022-10-18 RX ADMIN — Medication 975 MILLIGRAM(S): at 12:37

## 2022-10-18 RX ADMIN — Medication 975 MILLIGRAM(S): at 17:15

## 2022-10-18 RX ADMIN — Medication 975 MILLIGRAM(S): at 07:01

## 2022-10-18 RX ADMIN — ERTAPENEM SODIUM 120 MILLIGRAM(S): 1 INJECTION, POWDER, LYOPHILIZED, FOR SOLUTION INTRAMUSCULAR; INTRAVENOUS at 11:46

## 2022-10-18 RX ADMIN — INSULIN GLARGINE 9 UNIT(S): 100 INJECTION, SOLUTION SUBCUTANEOUS at 22:47

## 2022-10-18 RX ADMIN — Medication 975 MILLIGRAM(S): at 00:57

## 2022-10-18 RX ADMIN — Medication 975 MILLIGRAM(S): at 00:27

## 2022-10-18 RX ADMIN — Medication 20 MILLIGRAM(S): at 10:32

## 2022-10-18 RX ADMIN — PANTOPRAZOLE SODIUM 40 MILLIGRAM(S): 20 TABLET, DELAYED RELEASE ORAL at 11:45

## 2022-10-18 RX ADMIN — Medication 1 DROP(S): at 17:14

## 2022-10-18 RX ADMIN — Medication 975 MILLIGRAM(S): at 17:43

## 2022-10-18 RX ADMIN — Medication 975 MILLIGRAM(S): at 11:45

## 2022-10-18 RX ADMIN — Medication 975 MILLIGRAM(S): at 06:59

## 2022-10-18 RX ADMIN — RISPERIDONE 2 MILLIGRAM(S): 4 TABLET ORAL at 22:46

## 2022-10-18 RX ADMIN — Medication 50 MILLIGRAM(S): at 06:59

## 2022-10-18 NOTE — PROGRESS NOTE ADULT - ASSESSMENT
ASSESSMENT:  Patient is a 72 year old female with a PMHx of DM2, anxiety, depression and HTN who presented to pre-surgical testing with diagnosis of pancreatic cancer.  Patient is now S/P ex-lap and Whipple on 9/23/22.      PLAN:  - c/w CLD  - D/C Octretide due to low Amylase levels.  - UA  - Monitor MARGRET drain output, amylase daily  - D/c Flagyl/Ceftriaxone  - Started Ertapenem  - Out of bed  - Pain control  - PT rec outpatient PT  - VTE prophylaxis with Lovenox subcutaneous      A Team Surgery  pager: [phone]   ASSESSMENT:  Patient is a 72 year old female with a PMHx of DM2, anxiety, depression and HTN who presented to pre-surgical testing with diagnosis of pancreatic cancer.  Patient is now S/P ex-lap and Whipple on 9/23/22.      PLAN:  - DIET: Soft and bite sized   - 20 Lasix IVP  - Monitor MARGRET drain output, amylase daily  - D/c Flagyl/Ceftriaxone  - Started Ertapenem  - Out of bed  - Pain control  - PT rec outpatient PT  - VTE prophylaxis with Lovenox subcutaneous      A Team Surgery  pager: [phone]

## 2022-10-18 NOTE — PROGRESS NOTE ADULT - ASSESSMENT
71 y/o female presents with diagnosis of pancreatic cancer on endoscopy biopsy. Subsequent CT scan and PET scan confirm pathology. S/p whipple procedure pancreatic cancer with purulent drainage from abd drain and E.coli and strep anginosus in cx and PCN allergy    Keaton Grimes  Attending Physician   Division of Infectious Disease  Office #[phone]  Available on Microsoft Teams also  After 5pm/weekend or no response, call #[phone]

## 2022-10-18 NOTE — PROGRESS NOTE ADULT - SUBJECTIVE AND OBJECTIVE BOX
RUBIN HO 72y [id number]    Patient is a 72y old  Female who presents with a chief complaint of Pancreatic carcinoma (18 Oct 2022 12:22)      Follow Up/CC:  ID following for abd collections    Interval History/ROS: no fever, no complaints     Allergies    penicillin (Other)    Intolerances        ANTIMICROBIALS:  ertapenem  IVPB    ertapenem  IVPB 1000 every 24 hours      MEDICATIONS  (STANDING):  acetaminophen     Tablet .. 975 milliGRAM(s) Oral every 6 hours  artificial  tears Solution 1 Drop(s) Both EYES two times a day  enoxaparin Injectable 40 milliGRAM(s) SubCutaneous every 24 hours  ertapenem  IVPB      ertapenem  IVPB 1000 milliGRAM(s) IV Intermittent every 24 hours  insulin glargine Injectable (LANTUS) 9 Unit(s) SubCutaneous at bedtime  insulin lispro (ADMELOG) corrective regimen sliding scale   SubCutaneous Before meals and at bedtime  metoprolol tartrate 50 milliGRAM(s) Oral daily  pantoprazole  Injectable 40 milliGRAM(s) IV Push every 24 hours  risperiDONE   Tablet 2 milliGRAM(s) Oral at bedtime    MEDICATIONS  (PRN):  aluminum hydroxide/magnesium hydroxide/simethicone Suspension 30 milliLiter(s) Oral every 4 hours PRN Dyspepsia  sodium chloride 0.9% lock flush 10 milliLiter(s) IV Push every 1 hour PRN Pre/post blood products, medications, blood draw, and to maintain line patency        Vital Signs Last 24 Hrs  T(C): 36.9 (18 Oct 2022 12:00), Max: 37.1 (17 Oct 2022 16:36)  T(F): 98.5 (18 Oct 2022 12:00), Max: 98.7 (17 Oct 2022 16:36)  HR: 75 (18 Oct 2022 12:00) (75 - 89)  BP: 122/45 (18 Oct 2022 12:00) (122/45 - 141/45)  BP(mean): --  RR: 18 (18 Oct 2022 12:00) (17 - 18)  SpO2: 100% (18 Oct 2022 12:00) (97% - 100%)    Parameters below as of 18 Oct 2022 12:00  Patient On (Oxygen Delivery Method): room air        CBC Full  -  ( 18 Oct 2022 05:18 )  WBC Count : 12.27 K/uL  RBC Count : 3.16 M/uL  Hemoglobin : 8.8 g/dL  Hematocrit : 27.5 %  Platelet Count - Automated : 551 K/uL  Mean Cell Volume : 87.0 fL  Mean Cell Hemoglobin : 27.8 pg  Mean Cell Hemoglobin Concentration : 32.0 gm/dL  Auto Neutrophil # : x  Auto Lymphocyte # : x  Auto Monocyte # : x  Auto Eosinophil # : x  Auto Basophil # : x  Auto Neutrophil % : x  Auto Lymphocyte % : x  Auto Monocyte % : x  Auto Eosinophil % : x  Auto Basophil % : x    10-18    140  |  104  |  6<L>  ----------------------------<  63<L>  4.5   |  27  |  0.50    Ca    8.4      18 Oct 2022 05:18  Phos  2.8     10-18  Mg     2.10     10-18    TPro  5.4<L>  /  Alb  2.2<L>  /  TBili  0.3  /  DBili  x   /  AST  19  /  ALT  10  /  AlkPhos  305<H>  10-17    LIVER FUNCTIONS - ( 17 Oct 2022 05:50 )  Alb: 2.2 g/dL / Pro: 5.4 g/dL / ALK PHOS: 305 U/L / ALT: 10 U/L / AST: 19 U/L / GGT: x           Urinalysis Basic - ( 17 Oct 2022 08:25 )    Color: Light Yellow / Appearance: Clear / S.018 / pH: x  Gluc: x / Ketone: Negative  / Bili: Negative / Urobili: <2 mg/dL   Blood: x / Protein: Trace / Nitrite: Negative   Leuk Esterase: Negative / RBC: x / WBC x   Sq Epi: x / Non Sq Epi: x / Bacteria: x        MICROBIOLOGY:  Abdominal Fl Abdominal Fluid  10-12-22   Moderate Escherichia coli  Moderate Streptococcus anginosus "Susceptibilities not performed"  Numerous Prevotella buccae "Susceptibilities not performed"  --  Escherichia coli      Abdominal Fl Abdominal Fluid  10-01-22   No growth at 5 days  --    polymorphonuclear leukocytes seen  No organisms seen  by cytocentrifuge      Abdominal Fl Abdominal Fluid  10-01-22   Rare Streptococcus anginosus "Susceptibilities not performed"  --    No polymorphonuclear cells seen  No organisms seen  by cytocentrifuge      .Blood Blood  22   No Growth Final  --  --      .Blood Blood  22   No Growth Final  --  --              v            RADIOLOGY

## 2022-10-18 NOTE — PROGRESS NOTE ADULT - SUBJECTIVE AND OBJECTIVE BOX
A Team (General Surgery) Daily Progress Note    SUBJECTIVE:  Pt seen and examined during AM rounds. Patient states she is tolerating her diet, pain is controlled and has no complaints at this time. Patient denies nausea, vomiting, chest pain and SOB.    OBJECTIVE:  Vital Signs Last 24 Hrs  T(C): 36.9 (18 Oct 2022 06:43), Max: 37.1 (17 Oct 2022 16:36)  T(F): 98.5 (18 Oct 2022 06:43), Max: 98.7 (17 Oct 2022 16:36)  HR: 80 (18 Oct 2022 06:43) (80 - 89)  BP: 138/58 (18 Oct 2022 06:43) (131/50 - 141/45)  BP(mean): --  RR: 17 (18 Oct 2022 06:43) (17 - 18)  SpO2: 98% (18 Oct 2022 06:43) (97% - 98%)    Parameters below as of 18 Oct 2022 06:43  Patient On (Oxygen Delivery Method): room air    I&O's Detail    17 Oct 2022 07:01  -  18 Oct 2022 07:00  --------------------------------------------------------  IN:    dextrose 5% + sodium chloride 0.9%: 450 mL    Oral Fluid: 660 mL  Total IN: 1110 mL    OUT:    Drain (mL): 2.5 mL    Voided (mL): 1900 mL  Total OUT: 1902.5 mL    Total NET: -792.5 mL      Exam:  Constitutional: A&Ox3, NAD  Respiratory: Unlabored breathing  Abdomen: Soft, nondistended, appropriately tender, left CRALIN drain w/ milky, purulent output, right old carlin site is fibrinous  Extremities: WWP, VENEGAS spontaneously, bilateral pitting edema             LABS                 A Team (General Surgery) Daily Progress Note    SUBJECTIVE:  Pt seen and examined during AM rounds. Patient states she is tolerating her diet, pain is controlled and has no complaints at this time. Patient denies nausea, vomiting, chest pain and SOB.    OBJECTIVE:  Vital Signs Last 24 Hrs  T(C): 36.9 (18 Oct 2022 06:43), Max: 37.1 (17 Oct 2022 16:36)  T(F): 98.5 (18 Oct 2022 06:43), Max: 98.7 (17 Oct 2022 16:36)  HR: 80 (18 Oct 2022 06:43) (80 - 89)  BP: 138/58 (18 Oct 2022 06:43) (131/50 - 141/45)  BP(mean): --  RR: 17 (18 Oct 2022 06:43) (17 - 18)  SpO2: 98% (18 Oct 2022 06:43) (97% - 98%)    Parameters below as of 18 Oct 2022 06:43  Patient On (Oxygen Delivery Method): room air    I&O's Detail    17 Oct 2022 07:01  -  18 Oct 2022 07:00  --------------------------------------------------------  IN:    dextrose 5% + sodium chloride 0.9%: 450 mL    Oral Fluid: 660 mL  Total IN: 1110 mL    OUT:    Drain (mL): 2.5 mL    Voided (mL): 1900 mL  Total OUT: 1902.5 mL    Total NET: -792.5 mL      Exam:  Constitutional: A&Ox3, NAD  Respiratory: Unlabored breathing  Abdomen: Soft, nondistended, appropriately tender, left CARLIN drain w/ milky, purulent output, right old carlin site is fibrinous  Extremities: WWP, VENEGAS spontaneously, bilateral pitting edema             LABS                        8.8    12.27 )-----------( 551      ( 18 Oct 2022 05:18 )             27.5   10-18    140  |  104  |  6<L>  ----------------------------<  63<L>  4.5   |  27  |  0.50    Ca    8.4      18 Oct 2022 05:18  Phos  2.8     10-18  Mg     2.10     10-18    TPro  5.4<L>  /  Alb  2.2<L>  /  TBili  0.3  /  DBili  x   /  AST  19  /  ALT  10  /  AlkPhos  305<H>  10-17

## 2022-10-18 NOTE — PROGRESS NOTE ADULT - SUBJECTIVE AND OBJECTIVE BOX
Subjective: I had a good night sleep. I ate this morning.  No nausea and no vomiting. I have no abdominal pain.    Objective:   Vital Signs Last 24 Hrs  T(C): 36.9 (18 Oct 2022 08:20), Max: 37.1 (17 Oct 2022 16:36)  T(F): 98.5 (18 Oct 2022 08:20), Max: 98.7 (17 Oct 2022 16:36)  HR: 78 (18 Oct 2022 08:20) (78 - 89)  BP: 135/60 (18 Oct 2022 08:20) (134/51 - 141/45)  BP(mean): --  RR: 18 (18 Oct 2022 08:20) (17 - 18)  SpO2: 100% (18 Oct 2022 08:20) (97% - 100%)    Parameters below as of 18 Oct 2022 08:20  Patient On (Oxygen Delivery Method): room air        Daily     Daily     Heent: N/C, AT PER no scleral icterus, No JVD  Pul: clear  Cor: RRR  Abdomen: soft, normal BS. Wound - clean - Rod drain still with purulent looking fluid - minimal volume.  Extremities: without edema, motor/sensory intact    I&O's Detail    17 Oct 2022 07:01  -  18 Oct 2022 07:00  --------------------------------------------------------  IN:    dextrose 5% + sodium chloride 0.9%: 450 mL    Oral Fluid: 660 mL  Total IN: 1110 mL    OUT:    Drain (mL): 2.5 mL    Voided (mL): 1900 mL  Total OUT: 1902.5 mL    Total NET: -792.5 mL      18 Oct 2022 07:01  -  18 Oct 2022 12:23  --------------------------------------------------------  IN:    Oral Fluid: 100 mL  Total IN: 100 mL    OUT:    Drain (mL): 2.5 mL    Voided (mL): 550 mL  Total OUT: 552.5 mL    Total NET: -452.5 mL          MEDICATIONS  (STANDING):  acetaminophen     Tablet .. 975 milliGRAM(s) Oral every 6 hours  artificial  tears Solution 1 Drop(s) Both EYES two times a day  enoxaparin Injectable 40 milliGRAM(s) SubCutaneous every 24 hours  ertapenem  IVPB      ertapenem  IVPB 1000 milliGRAM(s) IV Intermittent every 24 hours  insulin glargine Injectable (LANTUS) 9 Unit(s) SubCutaneous at bedtime  insulin lispro (ADMELOG) corrective regimen sliding scale   SubCutaneous Before meals and at bedtime  metoprolol tartrate 50 milliGRAM(s) Oral daily  pantoprazole  Injectable 40 milliGRAM(s) IV Push every 24 hours  risperiDONE   Tablet 2 milliGRAM(s) Oral at bedtime    MEDICATIONS  (PRN):  aluminum hydroxide/magnesium hydroxide/simethicone Suspension 30 milliLiter(s) Oral every 4 hours PRN Dyspepsia  sodium chloride 0.9% lock flush 10 milliLiter(s) IV Push every 1 hour PRN Pre/post blood products, medications, blood draw, and to maintain line patency                            8.8    12.27 )-----------( 551      ( 18 Oct 2022 05:18 )             27.5     10-18    140  |  104  |  6<L>  ----------------------------<  63<L>  4.5   |  27  |  0.50    Ca    8.4      18 Oct 2022 05:18  Phos  2.8     10-18  Mg     2.10     10-18    TPro  5.4<L>  /  Alb  2.2<L>  /  TBili  0.3  /  DBili  x   /  AST  19  /  ALT  10  /  AlkPhos  305<H>  10-17        Radiologic Studies:

## 2022-10-18 NOTE — PROGRESS NOTE ADULT - ASSESSMENT
S/P Whipple's procedure  Faye-pancreatic fluid - NO abscess formation  WBC decreasing.  Malnutrition

## 2022-10-18 NOTE — PROGRESS NOTE ADULT - REASON FOR ADMISSION
Pancreatic carcinoma
Pancreatic carcinoma
S / P  WHIPPLE Procedure.
[place]
Pancreatic carcinoma
Whipple procedure
[place]
Pancreatic carcinoma
Pancreatic carcinoma
Pancreatic mass
Pancreatic neoplasm
Whipple procedure
Pancreatic carcinoma

## 2022-10-19 LAB
ANION GAP SERPL CALC-SCNC: 9 MMOL/L — SIGNIFICANT CHANGE UP (ref 7–14)
BUN SERPL-MCNC: 9 MG/DL — SIGNIFICANT CHANGE UP (ref 7–23)
CALCIUM SERPL-MCNC: 8.5 MG/DL — SIGNIFICANT CHANGE UP (ref 8.4–10.5)
CHLORIDE SERPL-SCNC: 102 MMOL/L — SIGNIFICANT CHANGE UP (ref 98–107)
CO2 SERPL-SCNC: 27 MMOL/L — SIGNIFICANT CHANGE UP (ref 22–31)
CREAT SERPL-MCNC: 0.55 MG/DL — SIGNIFICANT CHANGE UP (ref 0.5–1.3)
EGFR: 97 ML/MIN/1.73M2 — SIGNIFICANT CHANGE UP
GLUCOSE BLDC GLUCOMTR-MCNC: 106 MG/DL — HIGH (ref 70–99)
GLUCOSE BLDC GLUCOMTR-MCNC: 112 MG/DL — HIGH (ref 70–99)
GLUCOSE BLDC GLUCOMTR-MCNC: 116 MG/DL — HIGH (ref 70–99)
GLUCOSE BLDC GLUCOMTR-MCNC: 79 MG/DL — SIGNIFICANT CHANGE UP (ref 70–99)
GLUCOSE SERPL-MCNC: 80 MG/DL — SIGNIFICANT CHANGE UP (ref 70–99)
HCT VFR BLD CALC: 26.9 % — LOW (ref 34.5–45)
HGB BLD-MCNC: 8.6 G/DL — LOW (ref 11.5–15.5)
MAGNESIUM SERPL-MCNC: 1.9 MG/DL — SIGNIFICANT CHANGE UP (ref 1.6–2.6)
MCHC RBC-ENTMCNC: 27.6 PG — SIGNIFICANT CHANGE UP (ref 27–34)
MCHC RBC-ENTMCNC: 32 GM/DL — SIGNIFICANT CHANGE UP (ref 32–36)
MCV RBC AUTO: 86.2 FL — SIGNIFICANT CHANGE UP (ref 80–100)
NRBC # BLD: 0 /100 WBCS — SIGNIFICANT CHANGE UP (ref 0–0)
NRBC # FLD: 0 K/UL — SIGNIFICANT CHANGE UP (ref 0–0)
PHOSPHATE SERPL-MCNC: 2.8 MG/DL — SIGNIFICANT CHANGE UP (ref 2.5–4.5)
PLATELET # BLD AUTO: 534 K/UL — HIGH (ref 150–400)
POTASSIUM SERPL-MCNC: 4.1 MMOL/L — SIGNIFICANT CHANGE UP (ref 3.5–5.3)
POTASSIUM SERPL-SCNC: 4.1 MMOL/L — SIGNIFICANT CHANGE UP (ref 3.5–5.3)
RBC # BLD: 3.12 M/UL — LOW (ref 3.8–5.2)
RBC # FLD: 13.6 % — SIGNIFICANT CHANGE UP (ref 10.3–14.5)
SODIUM SERPL-SCNC: 138 MMOL/L — SIGNIFICANT CHANGE UP (ref 135–145)
WBC # BLD: 8.34 K/UL — SIGNIFICANT CHANGE UP (ref 3.8–10.5)
WBC # FLD AUTO: 8.34 K/UL — SIGNIFICANT CHANGE UP (ref 3.8–10.5)

## 2022-10-19 PROCEDURE — 99232 SBSQ HOSP IP/OBS MODERATE 35: CPT

## 2022-10-19 RX ORDER — CEPHALEXIN 500 MG
500 CAPSULE ORAL THREE TIMES A DAY
Refills: 0 | Status: DISCONTINUED | OUTPATIENT
Start: 2022-10-19 | End: 2022-10-20

## 2022-10-19 RX ORDER — MAGNESIUM SULFATE 500 MG/ML
2 VIAL (ML) INJECTION ONCE
Refills: 0 | Status: COMPLETED | OUTPATIENT
Start: 2022-10-19 | End: 2022-10-19

## 2022-10-19 RX ORDER — METRONIDAZOLE 500 MG
500 TABLET ORAL
Refills: 0 | Status: DISCONTINUED | OUTPATIENT
Start: 2022-10-19 | End: 2022-10-20

## 2022-10-19 RX ORDER — SODIUM,POTASSIUM PHOSPHATES 278-250MG
1 POWDER IN PACKET (EA) ORAL ONCE
Refills: 0 | Status: COMPLETED | OUTPATIENT
Start: 2022-10-19 | End: 2022-10-19

## 2022-10-19 RX ADMIN — Medication 500 MILLIGRAM(S): at 22:12

## 2022-10-19 RX ADMIN — Medication 975 MILLIGRAM(S): at 22:17

## 2022-10-19 RX ADMIN — Medication 975 MILLIGRAM(S): at 06:13

## 2022-10-19 RX ADMIN — Medication 50 MILLIGRAM(S): at 06:13

## 2022-10-19 RX ADMIN — ENOXAPARIN SODIUM 40 MILLIGRAM(S): 100 INJECTION SUBCUTANEOUS at 10:55

## 2022-10-19 RX ADMIN — Medication 975 MILLIGRAM(S): at 02:03

## 2022-10-19 RX ADMIN — Medication 25 GRAM(S): at 10:53

## 2022-10-19 RX ADMIN — INSULIN GLARGINE 9 UNIT(S): 100 INJECTION, SOLUTION SUBCUTANEOUS at 22:11

## 2022-10-19 RX ADMIN — Medication 975 MILLIGRAM(S): at 14:00

## 2022-10-19 RX ADMIN — PANTOPRAZOLE SODIUM 40 MILLIGRAM(S): 20 TABLET, DELAYED RELEASE ORAL at 10:55

## 2022-10-19 RX ADMIN — Medication 1 PACKET(S): at 10:53

## 2022-10-19 RX ADMIN — Medication 1 DROP(S): at 17:40

## 2022-10-19 RX ADMIN — ERTAPENEM SODIUM 120 MILLIGRAM(S): 1 INJECTION, POWDER, LYOPHILIZED, FOR SOLUTION INTRAMUSCULAR; INTRAVENOUS at 13:58

## 2022-10-19 RX ADMIN — RISPERIDONE 2 MILLIGRAM(S): 4 TABLET ORAL at 22:12

## 2022-10-19 RX ADMIN — Medication 1 DROP(S): at 06:12

## 2022-10-19 NOTE — PROGRESS NOTE ADULT - ASSESSMENT
71 y/o female presents with diagnosis of pancreatic cancer on endoscopy biopsy. Subsequent CT scan and PET scan confirm pathology. S/p whipple procedure pancreatic cancer with purulent drainage from abd drain and E.coli and strep anginosus in cx and PCN allergy    Keaton Grimes  Attending Physician   Division of Infectious Disease  Office #[phone]  Available on [employer] Teams also  After 5pm/weekend or no response, call #[phone]

## 2022-10-19 NOTE — PROGRESS NOTE ADULT - SUBJECTIVE AND OBJECTIVE BOX
Surgery Progress Note     Patient is a 72y old  Female who presents with a chief complaint of Pancreatic carcinoma (18 Oct 2022 12:22)      HPI:  This is a 71 y/o female whose native language is [place] Creole; comprehends and verbalizes minimal English. Refused hospital ; requested sister Mariaa function as . Patient   presents with diagnosis of pancreatic cancer on endoscopy biopsy. Subsequent CT scan and PET scan confirm pathology. Scheduled for whipple procedure pancreatic cancer (20 Sep 2022 07:10)      PAST MEDICAL & SURGICAL HISTORY:  Language barrier  native language [place] Creole; comprehends and verbzlizes minimal English      Type 2 diabetes mellitus      Anxiety and depression      Hypertension      Pancreatic cancer  September 2022      Uterine fibroid  hysterectomyu 1988      H/O jaundice  biliary stent in place          Physical Exam:    Vital Signs Last 24 Hrs  T(C): 36.7 (19 Oct 2022 11:49), Max: 37 (18 Oct 2022 16:58)  T(F): 98 (19 Oct 2022 11:49), Max: 98.6 (18 Oct 2022 16:58)  HR: 72 (19 Oct 2022 11:49) (72 - 90)  BP: 121/40 (19 Oct 2022 11:49) (121/40 - 152/56)  BP(mean): --  RR: 16 (19 Oct 2022 11:49) (16 - 18)  SpO2: 100% (19 Oct 2022 11:49) (95% - 100%)    Parameters below as of 19 Oct 2022 11:49  Patient On (Oxygen Delivery Method): room air        General appearance:  Comfortable. No pain.To cont.        Drainage    Labs:                          8.6    8.34  )-----------( 534      ( 19 Oct 2022 07:09 )             26.9     10-19    138  |  102  |  9   ----------------------------<  80  4.1   |  27  |  0.55    Ca    8.5      19 Oct 2022 07:09  Phos  2.8     10-19  Mg     1.90     10-19            Assessment and Plan:

## 2022-10-19 NOTE — PROGRESS NOTE ADULT - SUBJECTIVE AND OBJECTIVE BOX
A Team (General Surgery) Daily Progress Note    SUBJECTIVE:  Pt seen and examined, and is resting comfortably in bed. No acute events overnight.  Patient states she is tolerating her diet, pain is controlled and has no complaints at this time. Patient denies nausea, vomiting, chest pain and SOB.      OBJECTIVE:  Vital Signs Last 24 Hrs  T(C): 36.9 (19 Oct 2022 06:15), Max: 37 (18 Oct 2022 16:58)  T(F): 98.4 (19 Oct 2022 06:15), Max: 98.6 (18 Oct 2022 16:58)  HR: 87 (19 Oct 2022 06:15) (75 - 90)  BP: 137/59 (19 Oct 2022 06:15) (122/45 - 152/56)  BP(mean): --  RR: 16 (19 Oct 2022 06:15) (16 - 18)  SpO2: 95% (19 Oct 2022 06:15) (95% - 100%)    Parameters below as of 19 Oct 2022 06:15  Patient On (Oxygen Delivery Method): room air        I&O's Detail    18 Oct 2022 07:01  -  19 Oct 2022 07:00  --------------------------------------------------------  IN:    IV PiggyBack: 50 mL    Oral Fluid: 1085 mL  Total IN: 1135 mL    OUT:    Drain (mL): 10 mL    Voided (mL): 1725 mL  Total OUT: 1735 mL    Total NET: -600 mL        Exam:  spiratory: Unlabored breathing  Abdomen: Soft, nondistended, appropriately tender, left CARLIN drain w/ milky, purulent output, right old carlin site is fibrinous  Extremities: WWP, VENEGAS spontaneously, bilateral pitting edema                                   8.6    8.34  )-----------( 534      ( 19 Oct 2022 07:09 )             26.9       10-19    138  |  102  |  9   ----------------------------<  80  4.1   |  27  |  0.55    Ca    8.5      19 Oct 2022 07:09  Phos  2.8     10-19  Mg     1.90     10-19            ASSESSMENT:  fabrizio is a 72 year old female with a PMHx of DM2, anxiety, depression and HTN who presented to pre-surgical testing with diagnosis of pancreatic cancer.  Patient is now S/P ex-lap and Whipple on 9/23/22.      PLAN:  - DIET: Soft and bite sized   - 20 Lasix IVP  - d/c R CARLIN drain   - D/c Flagyl/Ceftriaxone  - Started Ertapenem  - Out of bed  - Pain control  - PT rec outpatient PT  - VTE prophylaxis with Lovenox subcutaneous        A Team Surgery  pager: [phone]

## 2022-10-19 NOTE — PROGRESS NOTE ADULT - SUBJECTIVE AND OBJECTIVE BOX
RUBIN HO 72y [id number]    Patient is a 72y old  Female who presents with a chief complaint of Pancreatic carcinoma (18 Oct 2022 12:22)      Follow Up/CC:  ID following for abd collections    Interval History/ROS: no fever, drain to be removed today     Allergies    penicillin (Other)    Intolerances        ANTIMICROBIALS:  cephalexin 500 three times a day  metroNIDAZOLE    Tablet 500 two times a day      MEDICATIONS  (STANDING):  acetaminophen     Tablet .. 975 milliGRAM(s) Oral every 6 hours  artificial  tears Solution 1 Drop(s) Both EYES two times a day  cephalexin 500 milliGRAM(s) Oral three times a day  enoxaparin Injectable 40 milliGRAM(s) SubCutaneous every 24 hours  insulin glargine Injectable (LANTUS) 9 Unit(s) SubCutaneous at bedtime  insulin lispro (ADMELOG) corrective regimen sliding scale   SubCutaneous Before meals and at bedtime  metoprolol tartrate 50 milliGRAM(s) Oral daily  metroNIDAZOLE    Tablet 500 milliGRAM(s) Oral two times a day  pantoprazole  Injectable 40 milliGRAM(s) IV Push every 24 hours  risperiDONE   Tablet 2 milliGRAM(s) Oral at bedtime    MEDICATIONS  (PRN):  aluminum hydroxide/magnesium hydroxide/simethicone Suspension 30 milliLiter(s) Oral every 4 hours PRN Dyspepsia  sodium chloride 0.9% lock flush 10 milliLiter(s) IV Push every 1 hour PRN Pre/post blood products, medications, blood draw, and to maintain line patency        Vital Signs Last 24 Hrs  T(C): 36.7 (19 Oct 2022 11:49), Max: 37 (18 Oct 2022 16:58)  T(F): 98 (19 Oct 2022 11:49), Max: 98.6 (18 Oct 2022 16:58)  HR: 72 (19 Oct 2022 11:49) (72 - 90)  BP: 121/40 (19 Oct 2022 11:49) (121/40 - 152/56)  BP(mean): --  RR: 16 (19 Oct 2022 11:49) (16 - 18)  SpO2: 100% (19 Oct 2022 11:49) (95% - 100%)    Parameters below as of 19 Oct 2022 11:49  Patient On (Oxygen Delivery Method): room air        CBC Full  -  ( 19 Oct 2022 07:09 )  WBC Count : 8.34 K/uL  RBC Count : 3.12 M/uL  Hemoglobin : 8.6 g/dL  Hematocrit : 26.9 %  Platelet Count - Automated : 534 K/uL  Mean Cell Volume : 86.2 fL  Mean Cell Hemoglobin : 27.6 pg  Mean Cell Hemoglobin Concentration : 32.0 gm/dL  Auto Neutrophil # : x  Auto Lymphocyte # : x  Auto Monocyte # : x  Auto Eosinophil # : x  Auto Basophil # : x  Auto Neutrophil % : x  Auto Lymphocyte % : x  Auto Monocyte % : x  Auto Eosinophil % : x  Auto Basophil % : x    10-19    138  |  102  |  9   ----------------------------<  80  4.1   |  27  |  0.55    Ca    8.5      19 Oct 2022 07:09  Phos  2.8     10-19  Mg     1.90     10-19            MICROBIOLOGY:  Abdominal Fl Abdominal Fluid  10-12-22   Moderate Escherichia coli  Moderate Streptococcus anginosus "Susceptibilities not performed"  Numerous Prevotella buccae "Susceptibilities not performed"  --  Escherichia coli      Abdominal Fl Abdominal Fluid  10-01-22   No growth at 5 days  --    polymorphonuclear leukocytes seen  No organisms seen  by cytocentrifuge      Abdominal Fl Abdominal Fluid  10-01-22   Rare Streptococcus anginosus "Susceptibilities not performed"  --    No polymorphonuclear cells seen  No organisms seen  by cytocentrifuge      .Blood Blood  09-29-22   No Growth Final  --  --      .Blood Blood  09-28-22   No Growth Final  --  --        RADIOLOGY

## 2022-10-19 NOTE — PROGRESS NOTE ADULT - NSPROGADDITIONALINFOA_GEN_ALL_CORE
EXAMINATION TYPE: XR chest 2V

 

DATE OF EXAM: 5/30/2022

 

COMPARISON: 8/9/2021

 

HISTORY: Vertebra

 

TECHNIQUE:

 

FINDINGS: Heart is normal. Lungs are clear of consolidation. There is no heart failure. There are no 
hilar masses. Thoracic aorta is atheromatous. There are chest leads. Costophrenic angles are clear.

 

IMPRESSION: No active cardiopulmonary disease. Normal heart. There is clearing of the pleural reactio
n left lung base compared to old exam.
Recovering well. WBC declining. Tolerating diet.
To continue Octreotide  To monitor drain output and amylase content.
To continue current Rx  plan  D / W MERLINE
To continue the current RX.   D / W patient and MERLINE.
Please call the ID service [phone] with questions or concerns over the weekend.
To continue the current Rx.
To hold off oral feeding for now until the patient is no longer nauseated.  Then resume clear liquids and advance as tolerated.  Pathology reviewed - will likely require adjuvant chemo and radiotherapy  To continue antibiotics - Follow up CT scan of abdomen on 10/17/2022.
To maintain all drains and antibiotics at this time.  To monitor labs (HCT, Bilirubin and Amylase level)  To continue weaning efforts from mechanical ventilation.
To maintain patency of drains and continue to trend amylase level  Octreotide injection daily  CT scan of abdomen and pelvis.  To advance diet as tolerated and optimize caloric intake.  To await pathology report.
U S  .... lower  EXTRIMITIES ...  no DVT>  C T Scan A/ P ...improving.  The  [facility] ...15 k. { Increased}    on antibiotics.    to cont the current Rx.
D / W MERLINE
Note as of I CU team note.   To continue the current plan of treatment.  D / W the MERLINE
Plan for Duplex scan of the lower extremities  CT scan of the abdomen and pelvis  To continue IV antibiotics.  To monitor WBC and GI tolerance.
Started on Octreotide  To remain on a soft diet  Awaiting pathology report.  To monitor for caloric and protein intake as the patient came in already malnourished.
To continue the current Rx.
To monitor drain output and nature  To monitor WBC  Diuresis as needed  To advance tube feed as tolerated.  Awaiting pathology report for further oncologic management.
To repeat CBC in am  If WBC is rising, we will obtain a CT scan of the abdomen and pelvis.  Dulcolax suppository  To encourage oral intake - If oral intake remains inadequate, we will request a consultation for TPN.  Further decision will depend on the pathology report as well.
Tolerating po liq.    To cont the current Rx.
To continue IV antibiotics and octreotide.  To repeat abdominal CT scan in 5 - 7 days,
To continue oral feeding  To monitor WBC and signs of sepsis.  To strip Rod drains more often.  To continue Octreotide.
To monitor drain output and amylase levels.  If WBC continues to increase, to obtain CT scan of abdomen and pelvis.  Advance diet as tolerated.  May need a Dulcolax suppository, since the colon was full of stool at the time of the procedure.  Awaiting pathology report
To take culture from the drain site. CT scan of abdomen and pelvis  CT scan of abdomen and pelvis  To start on broad spectrum antibiotics.  This was discussed with the A-Team chief resident.
To continue IV antibiotics.  To continue nutritional support  To ambulate as tolerated.
To obtain Flat plate and upright films of the abdomen  Further decision will depend on the result of the abd films  Awaiting pathology results.
To resume oral diet, starting with clear liquids and advance as tolerated  To decrease Dose of octreotide.  To continue IV antibiotics.  To monitor WBC
S/P Whipple procedure  in respiratory failure and hypotension  To continue maximum support measures  To await pathology report for further oncologic management.
Please call the ID service [phone] with questions or concerns over the weekend.
D/w surgery team

## 2022-10-20 VITALS
SYSTOLIC BLOOD PRESSURE: 139 MMHG | OXYGEN SATURATION: 99 % | RESPIRATION RATE: 18 BRPM | HEART RATE: 85 BPM | TEMPERATURE: 99 F | DIASTOLIC BLOOD PRESSURE: 55 MMHG

## 2022-10-20 LAB
ANION GAP SERPL CALC-SCNC: 9 MMOL/L — SIGNIFICANT CHANGE UP (ref 7–14)
BUN SERPL-MCNC: 9 MG/DL — SIGNIFICANT CHANGE UP (ref 7–23)
CALCIUM SERPL-MCNC: 8.7 MG/DL — SIGNIFICANT CHANGE UP (ref 8.4–10.5)
CHLORIDE SERPL-SCNC: 104 MMOL/L — SIGNIFICANT CHANGE UP (ref 98–107)
CO2 SERPL-SCNC: 26 MMOL/L — SIGNIFICANT CHANGE UP (ref 22–31)
CREAT SERPL-MCNC: 0.6 MG/DL — SIGNIFICANT CHANGE UP (ref 0.5–1.3)
EGFR: 95 ML/MIN/1.73M2 — SIGNIFICANT CHANGE UP
GLUCOSE BLDC GLUCOMTR-MCNC: 118 MG/DL — HIGH (ref 70–99)
GLUCOSE BLDC GLUCOMTR-MCNC: 142 MG/DL — HIGH (ref 70–99)
GLUCOSE SERPL-MCNC: 98 MG/DL — SIGNIFICANT CHANGE UP (ref 70–99)
HCT VFR BLD CALC: 27.7 % — LOW (ref 34.5–45)
HGB BLD-MCNC: 8.7 G/DL — LOW (ref 11.5–15.5)
MAGNESIUM SERPL-MCNC: 2 MG/DL — SIGNIFICANT CHANGE UP (ref 1.6–2.6)
MCHC RBC-ENTMCNC: 27.4 PG — SIGNIFICANT CHANGE UP (ref 27–34)
MCHC RBC-ENTMCNC: 31.4 GM/DL — LOW (ref 32–36)
MCV RBC AUTO: 87.1 FL — SIGNIFICANT CHANGE UP (ref 80–100)
NRBC # BLD: 0 /100 WBCS — SIGNIFICANT CHANGE UP (ref 0–0)
NRBC # FLD: 0 K/UL — SIGNIFICANT CHANGE UP (ref 0–0)
PHOSPHATE SERPL-MCNC: 3 MG/DL — SIGNIFICANT CHANGE UP (ref 2.5–4.5)
PLATELET # BLD AUTO: 537 K/UL — HIGH (ref 150–400)
POTASSIUM SERPL-MCNC: 4.5 MMOL/L — SIGNIFICANT CHANGE UP (ref 3.5–5.3)
POTASSIUM SERPL-SCNC: 4.5 MMOL/L — SIGNIFICANT CHANGE UP (ref 3.5–5.3)
RBC # BLD: 3.18 M/UL — LOW (ref 3.8–5.2)
RBC # FLD: 13.5 % — SIGNIFICANT CHANGE UP (ref 10.3–14.5)
SODIUM SERPL-SCNC: 139 MMOL/L — SIGNIFICANT CHANGE UP (ref 135–145)
WBC # BLD: 8.17 K/UL — SIGNIFICANT CHANGE UP (ref 3.8–10.5)
WBC # FLD AUTO: 8.17 K/UL — SIGNIFICANT CHANGE UP (ref 3.8–10.5)

## 2022-10-20 PROCEDURE — 99232 SBSQ HOSP IP/OBS MODERATE 35: CPT

## 2022-10-20 RX ORDER — METRONIDAZOLE 500 MG
1 TABLET ORAL
Qty: 18 | Refills: 0
Start: 2022-10-20 | End: 2022-10-28

## 2022-10-20 RX ORDER — CEPHALEXIN 500 MG
1 CAPSULE ORAL
Qty: 27 | Refills: 0
Start: 2022-10-20 | End: 2022-10-28

## 2022-10-20 RX ORDER — ACETAMINOPHEN 500 MG
2 TABLET ORAL
Qty: 0 | Refills: 0 | DISCHARGE
Start: 2022-10-20

## 2022-10-20 RX ORDER — INFLUENZA VIRUS VACCINE 15; 15; 15; 15 UG/.5ML; UG/.5ML; UG/.5ML; UG/.5ML
0.7 SUSPENSION INTRAMUSCULAR ONCE
Refills: 0 | Status: COMPLETED | OUTPATIENT
Start: 2022-10-20 | End: 2022-10-20

## 2022-10-20 RX ADMIN — PANTOPRAZOLE SODIUM 40 MILLIGRAM(S): 20 TABLET, DELAYED RELEASE ORAL at 12:27

## 2022-10-20 RX ADMIN — Medication 500 MILLIGRAM(S): at 12:26

## 2022-10-20 RX ADMIN — ENOXAPARIN SODIUM 40 MILLIGRAM(S): 100 INJECTION SUBCUTANEOUS at 12:26

## 2022-10-20 RX ADMIN — Medication 500 MILLIGRAM(S): at 13:06

## 2022-10-20 RX ADMIN — Medication 500 MILLIGRAM(S): at 08:54

## 2022-10-20 RX ADMIN — Medication 975 MILLIGRAM(S): at 13:36

## 2022-10-20 RX ADMIN — Medication 975 MILLIGRAM(S): at 13:06

## 2022-10-20 RX ADMIN — Medication 50 MILLIGRAM(S): at 06:12

## 2022-10-20 RX ADMIN — Medication 1 DROP(S): at 06:11

## 2022-10-20 RX ADMIN — Medication 975 MILLIGRAM(S): at 02:00

## 2022-10-20 NOTE — CHART NOTE - NSCHARTNOTEFT_GEN_A_CORE
NUTRITION FOLLOW-UP    Patient is a 72 year old female with a history of DM2, anxiety, depression and HTN who presented with a diagnosis of pancreatic cancer. Patient is now S/P ex-lap and Whipple on 9/23/22.    Pt is currently on a soft and bite sized diet with Glucerna Shake 3x/day. Pt states she has been eating well. She is also drinking 1-2 Glucerna Shakes/day. Pt is tolerating it well. Pt had no complaints of GI distress.   Please add consistent carb diet restriction to diet order.    Weight: 79.2 kg 10/13/22  Please obtain current weight    Labs: 10-20 Na 139 mmol/L Glu 98 mg/dL K+ 4.5 mmol/L Cr 0.60 mg/dL BUN 9 mg/dL Phos 3.0 mg/dL  10-20 @ 11:56 POCT 142 mg/dL  10-20 @ 08:30 POCT 118 mg/dL  10-19 @ 21:57 POCT 112 mg/dL  10-19 @ 17:50 POCT 116 mg/dL    Current Diet: Diet, Soft and Bite Sized:   Supplement Feeding Modality:  Oral  Glucerna Shake Cans or Servings Per Day:  1       Frequency:  Three Times a day (10-18-22 @ 09:01) [Active]    Edema: No edema noted    Skin: Surgical incision midline abdomen    RD to Remain Available: Xiomara Griffiths MS, RDN, CDN pager [phone]     Additional Recommendations:   1) Monitor weight, labs, po intake and skin integrity.

## 2022-10-20 NOTE — PROGRESS NOTE ADULT - PROBLEM SELECTOR PLAN 3
-tolerating CTX   -allergy was fainting more than 30 years ago
-tolerating invanz so far  -allergy was fainting more than 30 years ago
-tolerating keflex so far  -allergy was fainting more than 30 years ago
-tolerating CTX so far  -allergy was fainting more than 30 years ago

## 2022-10-20 NOTE — PROGRESS NOTE ADULT - PROBLEM SELECTOR PROBLEM 2
Abdominal fluid collection

## 2022-10-20 NOTE — PROGRESS NOTE ADULT - GASTROINTESTINAL
details… midline incision clean/soft/nontender/nondistended

## 2022-10-20 NOTE — PROGRESS NOTE ADULT - PROBLEM SELECTOR PROBLEM 1
Polymicrobial bacterial infection

## 2022-10-20 NOTE — CHART NOTE - NSCHARTNOTESELECT_GEN_ALL_CORE
Chart  Note/Nutrition Services
SICU transfer
Transfer to floor
Ultrasound bedside
Chart Note/Nutrition Services
Event Note
Nutrition Follow Up/Nutrition Services

## 2022-10-20 NOTE — PROGRESS NOTE ADULT - PROBLEM SELECTOR PLAN 2
-drain in place  -cont abx
-drain in place  -cont abx  -repeat CT noted
-drain in place  -cont abx  -repeat CT noted
-drain in place - per surgery   -cont abx  -repeat CT noted
-drain in place  -cont abx  -if worse, repeat CT
-drain in place - per surgery   -cont abx  -repeat CT noted

## 2022-10-20 NOTE — DISCHARGE NOTE NURSING/CASE MANAGEMENT/SOCIAL WORK - NSDCPEFALRISK_GEN_ALL_CORE
For information on Fall & Injury Prevention, visit: [email or web address] OR  [email or web address] OR  https://www.cdc.gov/steadi/patient.html

## 2022-10-20 NOTE — PROGRESS NOTE ADULT - COMMENTS
brother at bedside helped with hx

## 2022-10-20 NOTE — PROGRESS NOTE ADULT - ATTENDING COMMENTS
To D/C left Rod drain  To repeat WBC in am  To Continue antibiotics for 10 more days.
I agree with the detailed interval history, physical, and plan, which I have reviewed and edited where appropriate'; also agree with notes/assessment with my team on service.  I have personally examined the patient.  I was physically present for the key portions of the evaluation and management (E/M) service provided.  I reviewed all the pertinent data.  The patient is a critical care patient with life threatening hemodynamic and metabolic instability in SICU.  The SICU team has a constant risk benefit analyzes discussion and coordinating care with the primary team and all consultants.   The patient is in SICU with the chief complaint and diagnosis mentioned in the note.   The plan will be specified in the note.  72 year old W sp Whipple with chyle leak in SICU.  Awake and alert  lung clear  heart RR  Abd soft  Plan  NEUROLOGIC   - ylenol atc. oxy PRN  -  risperidone  RESPIRATORY   - room air  CARDIOVASCULAR   - metoprolol 50mg daily  GASTROINTESTINAL   - Diet: CLD, ensure cans BID  /RENAL   - IVL  - Lasix 40mg x 1 9/28  HEMATOLOGIC  - DVT ppx SQH  INFECTIOUS DISEASE  - Monitor fever   ENDOCRINE  - Monitor glucose    DISPO: d/c floor
I agree with the detailed interval history, physical, and plan, which I have reviewed and edited where appropriate'; also agree with notes/assessment with my team on service.  I have personally examined the patient.  I was physically present for the key portions of the evaluation and management (E/M) service provided.  I reviewed all the pertinent data.  The patient is a critical care patient with life threatening hemodynamic and metabolic instability in SICU.  The SICU team has a constant risk benefit analyzes discussion and coordinating care with the primary team and all consultants.   The patient is in SICU with the chief complaint and diagnosis mentioned in the note.   The plan will be specified in the note.  72 year old woman sp whipple, sp respiratory failure in SICU.  NEURO  Exam: awake, alert, oriented  RESPIRATORY  RR: 16   SpO2: 100%   Lung. coarse Bs  CARDIOVASCULAR  HR: 89  BP: 139/50   Abd soft NT  Plan  NEUROLOGIC   - Tylenol atc. Dilaudid PRN  - Risperidone  RESPIRATORY   - Face tent  CARDIOVASCULAR   - metoprolol 5mg q6hr  GASTROINTESTINAL   - Diet: NGT  - Start trickle TF  -/RENAL   - IV fluids:n d5 1/2NS +20kcl @ 50cc/hr  - Lasix 40mg x2 9/27  HEMATOLOGIC  - DVT ppx SQH  INFECTIOUS DISEASE  - Monitor fever   ENDOCRINE  - Monitor glucose  DISPO: SICU
Critical Care Dx    J95.89 Acute respiratory insufficiency, postoperative   -emergently intubated for aspiration/plugging  -abg stable, , decrease PEEP from 8 to 5, decrease Fi02, repeat ABG  -precedex for sedation, SBT today  D62 Acute blood loss anemia   -trend CBC, stable, monitor BP.  on phenylephrine, keep MAP 65  Z91.89 At risk for malnutrition   -prolonged NPO status likely  -will discuss with primary team tomorrow about resumption of PO nutrition       The patient is a critical care patient with life threatening hemodynamic and respiratory instability in SICU.  I have personally interviewed and examined the patient, reviewed data and laboratory tests/x-rays and all pertinent electronic images.  The SICU team has a constant risk benefit analyzes discussion with the primary team, all consultants, House Staff and PA's on all decisions.   Time involved in performance of separately billable procedures was not counted toward my critical care time. There is no overlap.    I have personally provided 60 minutes of critical care time concurrently with the resident/fellow. This time excludes time spent on separate procedures and time spent teaching. I have reviewed the resident's/fellow's documentation and agree with the assessment and plan of care.  I was physically present for the key portions of the evaluation and management (E/M) service provided.      Colton Moralez MD  Acute and Critical Care Surgery
I agree with the detailed interval history, physical, and plan, which I have reviewed and edited where appropriate'; also agree with notes/assessment with my team on service.  I have personally examined the patient.  I was physically present for the key portions of the evaluation and management (E/M) service provided.  I reviewed all the pertinent data.  The patient is a critical care patient with life threatening hemodynamic and metabolic instability in SICU.  The SICU team has a constant risk benefit analyzes discussion and coordinating care with the primary team and all consultants.   The patient is in SICU with the chief complaint and diagnosis mentioned in the note.   The plan will be specified in the note.  71 y/o female s/p Whipple in SICU for hypoxic respiratory failure.  NEURO  Exam: awake, alert, oriented  RESPIRATORY  Exam: clear  CARDIOVASCULAR  Exam: sinus tachycardia  GI/NUTRITION  Exam: soft, nontender, nondistended  Plan  NEUROLOGIC   - Tylenol atc. Dilaudid PRN  - risperidone  RESPIRATORY   room air  CARDIOVASCULAR   - metoprolol 5mg q6hr  GASTROINTESTINAL   - Diet: CLD, ensure cans BID  -/RENAL   - IVL  -- Lasix 40mg  HEMATOLOGIC  - DVT ppx SQH  INFECTIOUS DISEASE  - Monitor fever   ENDOCRINE  - Monitor glucose  DISPO: d/c floor
Patient s/p mary remained intubated over 24 hours, failed sbt yesterday.   N mentating appropriately on precedex  resp on minimal vent settings adequate gas exchange sbt plan for extubation today  cv hemodynamically stable, perfusing adequately received 2 fluid boluses overnight  gi npo, ivf, ng  gu/renal adequate uop  heme vte ppx  id on abx per surgery team  endo no changes    The patient is a critical care patient with life threatening hemodynamic and metabolic instability in SICU.  I have personally interviewed when possible and examined the patient, reviewed data and laboratory tests/x-rays and all pertinent electronic images.  I was physically present for the key portions of the evaluation and management (E/M) service provided.   The SICU team has a constant risk benefit analyzes discussion with the primary team, all consultants, House Staff and PA's on all decisions.  These diagnoses are unrelated to the surgical procedure noted above.  I meet with family if needed to get further history, discuss the case and make care decisions for this patient who might not be able to participate.  Time involved in performance of separately billable procedures was not counted toward my critical care time. There is no overlap.  I spent 55-75 minutes ( 0800Hrs-0915Hrs in AM/ 1600Hrs-1715Hrs in PM, or other time indicated) of critical care time for the diagnoses, assessment, plan and interventions.  This time excludes time spent on separate procedures and teaching.
To be discharged home today.  Return to my office in 1 week.  To consult with Medical oncology for probable adjuvant chemo and radiotherapy
Patient s/p whipple for pancreatic head mass left intubated overnight.  N on precedex, pain control multimodal with tylenol and diluadid prn  resp on minimal vent settings, adequate gas exchange on abg, cxr clear, sbt this am slightly tachypenic, will retry this afternoon  cv hemodynamically stable, intermittently on phenylephrine has been titrated off, perfusing adequately  gi npo, ivf, ngt, drain amylase sent, monitor  gu/renal adequate uop, normal cr  heme vte ppx  id cipro flagyl per surgery team  endo no changes    The patient is a critical care patient with life threatening hemodynamic and metabolic instability in SICU.  I have personally interviewed when possible and examined the patient, reviewed data and laboratory tests/x-rays and all pertinent electronic images.  I was physically present for the key portions of the evaluation and management (E/M) service provided.   The SICU team has a constant risk benefit analyzes discussion with the primary team, all consultants, House Staff and PA's on all decisions.  These diagnoses are unrelated to the surgical procedure noted above.  I meet with family if needed to get further history, discuss the case and make care decisions for this patient who might not be able to participate.  Time involved in performance of separately billable procedures was not counted toward my critical care time. There is no overlap.  I spent 55-75 minutes ( 0800Hrs-0915Hrs in AM/ 1600Hrs-1715Hrs in PM, or other time indicated) of critical care time for the diagnoses, assessment, plan and interventions.  This time excludes time spent on separate procedures and teaching.
I agree with the detailed interval history, physical, and plan, which I have reviewed and edited where appropriate'; also agree with notes/assessment with my team on service.  I have personally examined the patient.  I was physically present for the key portions of the evaluation and management (E/M) service provided.  I reviewed all the pertinent data.  The patient is a critical care patient with life threatening hemodynamic and metabolic instability in SICU.  The SICU team has a constant risk benefit analyzes discussion and coordinating care with the primary team and all consultants.   The patient is in SICU with the chief complaint and diagnosis mentioned in the note.   The plan will be specified in the note.  71 y/o female s/p Whipple in SICU post operatively, reintubated for hypoxic respiratory failure.   Sedated  LUNG coarse BS  Heart RR  Abd soft  PLAN  NEUROLOGI; sedated  - Tylenol  - wean precedex,   - Inititiate fentanyl   - Dilaudid PRN  RESPIRATORY; Acute respiratory insufficiency  -AC 18/400/8/50  - CPAP/PSV trials,   - Mucomyst, Duoneb  CARDIOVASCULAR   - MAP> 65  GASTROINTESTINAL   - Diet: NPO, NGT   /RENAL   - IV fluids: LR @ 50 cc/hr  HEMATOLOGIC  - DVT ppx SQH  INFECTIOUS DISEASE  - Zosyn  ENDOCRINE  - Monitor gluc  DISPO: SICU
I agree with the detailed interval history, physical, and plan, which I have reviewed and edited where appropriate'; also agree with notes/assessment with my team on service.  I have personally examined the patient.  I was physically present for the key portions of the evaluation and management (E/M) service provided.  I reviewed all the pertinent data.  The patient is a critical care patient with life threatening hemodynamic and metabolic instability in SICU.  The SICU team has a constant risk benefit analyzes discussion and coordinating care with the primary team and all consultants.   The patient is in SICU with the chief complaint and diagnosis mentioned in the note.   The plan will be specified in the note.  73 y/o female s/p Whipple in SICU with hypoxic respiratory failure.  Arousable  Lung coarse Bs  Heart RR   ABD softly dist  PLAN  NEUROLOGIC   - Tylenol  - Fentanyl. precedex   RESPIRATORY   -CPAP/PSV 5/5/30%  - Mucomyst, Duoneb  CARDIOVASCULAR   - metoprolol 5mg q6hr  GASTROINTESTINAL  - Start trickle TF  -/RENAL   - IV fluids: d5 1/2NS +20kcl @ 50cc/hr  - lasix 40mg iv x1  HEMATOLOGIC  - DVT ppx SQH  INFECTIOUS DISEASE  - Monitor fever   ENDOCRINE  - Monitor gluc  DISPO: SICU

## 2022-10-20 NOTE — PROGRESS NOTE ADULT - RESPIRATORY
clear to auscultation bilaterally/no wheezes/no respiratory distress

## 2022-10-20 NOTE — PROGRESS NOTE ADULT - SUBJECTIVE AND OBJECTIVE BOX
A Team (General Surgery) Daily Progress Note    SUBJECTIVE:  Pt seen and examined, and is resting comfortably in bed. No acute events overnight. Pain is adequately controlled on current regimen. Pt has no complaints at this time. Pt reports she is tolerating PO antibiotics and her diet. Pt denies nausea or vomiting. +/+    OBJECTIVE:  Vital Signs Last 24 Hrs  T(C): 37.1 (20 Oct 2022 06:08), Max: 37.2 (19 Oct 2022 16:53)  T(F): 98.7 (20 Oct 2022 06:08), Max: 98.9 (19 Oct 2022 16:53)  HR: 87 (20 Oct 2022 06:08) (72 - 89)  BP: 157/64 (20 Oct 2022 06:08) (121/40 - 157/64)  BP(mean): --  RR: 16 (20 Oct 2022 06:08) (16 - 18)  SpO2: 99% (20 Oct 2022 06:08) (99% - 100%)    Parameters below as of 20 Oct 2022 06:08  Patient On (Oxygen Delivery Method): room air        I&O's Detail    19 Oct 2022 07:01  -  20 Oct 2022 07:00  --------------------------------------------------------  IN:    IV PiggyBack: 50 mL    Oral Fluid: 1065 mL  Total IN: 1115 mL    OUT:    Drain (mL): 0 mL    Voided (mL): 1050 mL  Total OUT: 1050 mL    Total NET: 65 mL        Exam:  GEN: A&Ox3, NAD  HEENT: atraumatic, normocephalic  CV: no JVD  RESP: no increased work of breathing, no use of accessory muscles  GI/ABD: Soft, nondistended, appropriately tender, left CARLIN drain w/ milky, purulent output, right old carlin site is fibrinous  EXTREMITIES: warm, pink, well-perfused                        8.7    8.17  )-----------( 537      ( 20 Oct 2022 06:01 )             27.7       10-19    138  |  102  |  9   ----------------------------<  80  4.1   |  27  |  0.55    Ca    8.5      19 Oct 2022 07:09  Phos  2.8     10-19  Mg     1.90     10-19            ASSESSMENT:  Patient is a 72 year old female with a PMHx of DM2, anxiety, depression and HTN who presented to pre-surgical testing with diagnosis of pancreatic cancer.  Patient is now S/P ex-lap and Whipple on 9/23/22.      PLAN:  - DIET: Soft and bite sized   - c/w 500mg PO Flagyl BID and 500mg PO Keflex TID  - appreciate ID recs  - Out of bed/Ambulation  - Pain control  - PT rec outpatient PT  - VTE prophylaxis with Lovenox subcutaneous  - Dispo Planning      A Team Surgery  pager: [phone]       A Team (General Surgery) Daily Progress Note    SUBJECTIVE:  Pt seen and examined, and is resting comfortably in bed. No acute events overnight. Pain is adequately controlled on current regimen. Pt has no complaints at this time. Pt reports she is tolerating PO antibiotics and her diet. Pt denies nausea or vomiting. +/+    OBJECTIVE:  Vital Signs Last 24 Hrs  T(C): 37.1 (20 Oct 2022 06:08), Max: 37.2 (19 Oct 2022 16:53)  T(F): 98.7 (20 Oct 2022 06:08), Max: 98.9 (19 Oct 2022 16:53)  HR: 87 (20 Oct 2022 06:08) (72 - 89)  BP: 157/64 (20 Oct 2022 06:08) (121/40 - 157/64)  BP(mean): --  RR: 16 (20 Oct 2022 06:08) (16 - 18)  SpO2: 99% (20 Oct 2022 06:08) (99% - 100%)    Parameters below as of 20 Oct 2022 06:08  Patient On (Oxygen Delivery Method): room air        I&O's Detail    19 Oct 2022 07:01  -  20 Oct 2022 07:00  --------------------------------------------------------  IN:    IV PiggyBack: 50 mL    Oral Fluid: 1065 mL  Total IN: 1115 mL    OUT:    Drain (mL): 0 mL    Voided (mL): 1050 mL  Total OUT: 1050 mL    Total NET: 65 mL        Exam:  GEN: A&Ox3, NAD  HEENT: atraumatic, normocephalic  CV: no JVD  RESP: no increased work of breathing, no use of accessory muscles  GI/ABD: Soft, nondistended, appropriately tender, left Rod drain has been removed, right old drain site is fibrinous  EXTREMITIES: warm, pink, well-perfused                        8.7    8.17  )-----------( 537      ( 20 Oct 2022 06:01 )             27.7       10-19    138  |  102  |  9   ----------------------------<  80  4.1   |  27  |  0.55    Ca    8.5      19 Oct 2022 07:09  Phos  2.8     10-19  Mg     1.90     10-19            ASSESSMENT:  Patient is a 72 year old female with a PMHx of DM2, anxiety, depression and HTN who presented to the hospital with diagnosis of pancreatic cancer.  Patient is now S/P ex-lap and Whipple on 9/23/22.      PLAN:  - DIET: Soft and bite sized   - c/w 500mg PO Flagyl BID and 500mg PO Keflex TID  - appreciate ID recs  - Out of bed/Ambulation  - Pain control  - PT rec outpatient PT  - VTE prophylaxis with Lovenox subcutaneous  - Dispo Planning      A Team Surgery  pager: [phone]

## 2022-10-20 NOTE — PROGRESS NOTE ADULT - PROBLEM SELECTOR PROBLEM 3
Penicillin allergy

## 2022-10-20 NOTE — PROGRESS NOTE ADULT - ASSESSMENT
73 y/o female presents with diagnosis of pancreatic cancer on endoscopy biopsy. Subsequent CT scan and PET scan confirm pathology. S/p whipple procedure pancreatic cancer with purulent drainage from abd drain and E.coli and strep anginosus in cx and PCN allergy    Keaton Grimes  Attending Physician   Division of Infectious Disease  Office #[phone]  Available on Microsoft Teams also  After 5pm/weekend or no response, call #[phone]

## 2022-10-20 NOTE — PROGRESS NOTE ADULT - CONSTITUTIONAL
in bed, tired/no distress
in chair/no distress

## 2022-10-20 NOTE — PROGRESS NOTE ADULT - PROBLEM SELECTOR PLAN 1
-cont CTX/flagyl  -stable
-cont invanz  -stable  -monitor temps/wbc
-cont CTX/flagyl  - E coli S ceftriaxone  -stable
-DC invanz  -kelfex 50 mg po TID + flagyl 500 mg po bid x 7 days   -stable  -monitor temps/wbc
-cont invanz  -stable  -monitor temps/wbc
-keflex 50 mg po TID + flagyl 500 mg po bid x 7 days total  -stable  -monitor temps/wbc

## 2022-10-20 NOTE — DISCHARGE NOTE NURSING/CASE MANAGEMENT/SOCIAL WORK - PATIENT PORTAL LINK FT
You can access the FollowMyHealth Patient Portal offered by [facility] by registering at the following website: [email or web address]. By joining [facility]’s FollowMyHealth portal, you will also be able to view your health information using other applications (apps) compatible with our system.

## 2022-10-20 NOTE — PROGRESS NOTE ADULT - SUBJECTIVE AND OBJECTIVE BOX
RUBIN HO 72y [id number]    Patient is a 72y old  Female who presents with a chief complaint of Pancreatic carcinoma (18 Oct 2022 12:22)      Follow Up/CC:  ID following for abd collections    Interval History/ROS: no fever, no complaints, drain removed     Allergies    penicillin (Other)    Intolerances        ANTIMICROBIALS:  cephalexin 500 three times a day  metroNIDAZOLE    Tablet 500 two times a day      MEDICATIONS  (STANDING):  acetaminophen     Tablet .. 975 milliGRAM(s) Oral every 6 hours  artificial  tears Solution 1 Drop(s) Both EYES two times a day  cephalexin 500 milliGRAM(s) Oral three times a day  enoxaparin Injectable 40 milliGRAM(s) SubCutaneous every 24 hours  insulin glargine Injectable (LANTUS) 9 Unit(s) SubCutaneous at bedtime  insulin lispro (ADMELOG) corrective regimen sliding scale   SubCutaneous Before meals and at bedtime  metoprolol tartrate 50 milliGRAM(s) Oral daily  metroNIDAZOLE    Tablet 500 milliGRAM(s) Oral two times a day  pantoprazole  Injectable 40 milliGRAM(s) IV Push every 24 hours  risperiDONE   Tablet 2 milliGRAM(s) Oral at bedtime    MEDICATIONS  (PRN):  aluminum hydroxide/magnesium hydroxide/simethicone Suspension 30 milliLiter(s) Oral every 4 hours PRN Dyspepsia  sodium chloride 0.9% lock flush 10 milliLiter(s) IV Push every 1 hour PRN Pre/post blood products, medications, blood draw, and to maintain line patency        Vital Signs Last 24 Hrs  T(C): 36.2 (20 Oct 2022 08:58), Max: 37.2 (19 Oct 2022 16:53)  T(F): 97.2 (20 Oct 2022 08:58), Max: 98.9 (19 Oct 2022 16:53)  HR: 78 (20 Oct 2022 08:58) (72 - 89)  BP: 131/50 (20 Oct 2022 08:58) (121/40 - 157/64)  BP(mean): --  RR: 18 (20 Oct 2022 08:58) (16 - 18)  SpO2: 99% (20 Oct 2022 08:58) (99% - 100%)    Parameters below as of 20 Oct 2022 08:58  Patient On (Oxygen Delivery Method): room air        CBC Full  -  ( 20 Oct 2022 06:01 )  WBC Count : 8.17 K/uL  RBC Count : 3.18 M/uL  Hemoglobin : 8.7 g/dL  Hematocrit : 27.7 %  Platelet Count - Automated : 537 K/uL  Mean Cell Volume : 87.1 fL  Mean Cell Hemoglobin : 27.4 pg  Mean Cell Hemoglobin Concentration : 31.4 gm/dL  Auto Neutrophil # : x  Auto Lymphocyte # : x  Auto Monocyte # : x  Auto Eosinophil # : x  Auto Basophil # : x  Auto Neutrophil % : x  Auto Lymphocyte % : x  Auto Monocyte % : x  Auto Eosinophil % : x  Auto Basophil % : x    10-20    139  |  104  |  9   ----------------------------<  98  4.5   |  26  |  0.60    Ca    8.7      20 Oct 2022 06:01  Phos  3.0     10-20  Mg     2.00     10-20            MICROBIOLOGY:  Abdominal Fl Abdominal Fluid  10-12-22   Moderate Escherichia coli  Moderate Streptococcus anginosus "Susceptibilities not performed"  Numerous Prevotella buccae "Susceptibilities not performed"  --  Escherichia coli      Abdominal Fl Abdominal Fluid  10-01-22   No growth at 5 days  --    polymorphonuclear leukocytes seen  No organisms seen  by cytocentrifuge      Abdominal Fl Abdominal Fluid  10-01-22   Rare Streptococcus anginosus "Susceptibilities not performed"  --    No polymorphonuclear cells seen  No organisms seen  by cytocentrifuge      .Blood Blood  09-29-22   No Growth Final  --  --      .Blood Blood  09-28-22   No Growth Final  --  --      RADIOLOGY

## 2022-11-29 PROBLEM — Z78.9 OTHER SPECIFIED HEALTH STATUS: Chronic | Status: ACTIVE | Noted: 2022-09-20

## 2022-11-29 PROBLEM — F41.9 ANXIETY DISORDER, UNSPECIFIED: Chronic | Status: ACTIVE | Noted: 2022-09-20

## 2022-11-29 PROBLEM — C25.9 MALIGNANT NEOPLASM OF PANCREAS, UNSPECIFIED: Chronic | Status: ACTIVE | Noted: 2022-09-20

## 2022-11-29 PROBLEM — I10 ESSENTIAL (PRIMARY) HYPERTENSION: Chronic | Status: ACTIVE | Noted: 2022-09-20

## 2022-11-29 PROBLEM — E11.9 TYPE 2 DIABETES MELLITUS WITHOUT COMPLICATIONS: Chronic | Status: ACTIVE | Noted: 2022-09-20

## 2022-11-30 ENCOUNTER — OUTPATIENT (OUTPATIENT)
Dept: OUTPATIENT SERVICES | Facility: HOSPITAL | Age: 72
LOS: 1 days | End: 2022-11-30

## 2022-11-30 VITALS
HEART RATE: 70 BPM | OXYGEN SATURATION: 98 % | TEMPERATURE: 97 F | HEIGHT: 64 IN | DIASTOLIC BLOOD PRESSURE: 66 MMHG | SYSTOLIC BLOOD PRESSURE: 102 MMHG | RESPIRATION RATE: 16 BRPM | WEIGHT: 151.02 LBS

## 2022-11-30 DIAGNOSIS — E11.9 TYPE 2 DIABETES MELLITUS WITHOUT COMPLICATIONS: ICD-10-CM

## 2022-11-30 DIAGNOSIS — Z90.710 ACQUIRED ABSENCE OF BOTH CERVIX AND UTERUS: Chronic | ICD-10-CM

## 2022-11-30 DIAGNOSIS — D25.9 LEIOMYOMA OF UTERUS, UNSPECIFIED: Chronic | ICD-10-CM

## 2022-11-30 DIAGNOSIS — C25.9 MALIGNANT NEOPLASM OF PANCREAS, UNSPECIFIED: ICD-10-CM

## 2022-11-30 DIAGNOSIS — Z87.898 PERSONAL HISTORY OF OTHER SPECIFIED CONDITIONS: Chronic | ICD-10-CM

## 2022-11-30 DIAGNOSIS — Z90.411 ACQUIRED PARTIAL ABSENCE OF PANCREAS: Chronic | ICD-10-CM

## 2022-11-30 LAB
A1C WITH ESTIMATED AVERAGE GLUCOSE RESULT: 7.1 % — HIGH (ref 4–5.6)
ALBUMIN SERPL ELPH-MCNC: 3.7 G/DL — SIGNIFICANT CHANGE UP (ref 3.3–5)
ALP SERPL-CCNC: 116 U/L — SIGNIFICANT CHANGE UP (ref 40–120)
ALT FLD-CCNC: 21 U/L — SIGNIFICANT CHANGE UP (ref 4–33)
ANION GAP SERPL CALC-SCNC: 11 MMOL/L — SIGNIFICANT CHANGE UP (ref 7–14)
AST SERPL-CCNC: 21 U/L — SIGNIFICANT CHANGE UP (ref 4–32)
BILIRUB SERPL-MCNC: 0.2 MG/DL — SIGNIFICANT CHANGE UP (ref 0.2–1.2)
BUN SERPL-MCNC: 19 MG/DL — SIGNIFICANT CHANGE UP (ref 7–23)
CALCIUM SERPL-MCNC: 9.7 MG/DL — SIGNIFICANT CHANGE UP (ref 8.4–10.5)
CHLORIDE SERPL-SCNC: 102 MMOL/L — SIGNIFICANT CHANGE UP (ref 98–107)
CO2 SERPL-SCNC: 26 MMOL/L — SIGNIFICANT CHANGE UP (ref 22–31)
CREAT SERPL-MCNC: 0.69 MG/DL — SIGNIFICANT CHANGE UP (ref 0.5–1.3)
EGFR: 92 ML/MIN/1.73M2 — SIGNIFICANT CHANGE UP
ESTIMATED AVERAGE GLUCOSE: 157 — SIGNIFICANT CHANGE UP
GLUCOSE SERPL-MCNC: 145 MG/DL — HIGH (ref 70–99)
HCT VFR BLD CALC: 40.3 % — SIGNIFICANT CHANGE UP (ref 34.5–45)
HGB BLD-MCNC: 12.4 G/DL — SIGNIFICANT CHANGE UP (ref 11.5–15.5)
MCHC RBC-ENTMCNC: 26.7 PG — LOW (ref 27–34)
MCHC RBC-ENTMCNC: 30.8 GM/DL — LOW (ref 32–36)
MCV RBC AUTO: 86.7 FL — SIGNIFICANT CHANGE UP (ref 80–100)
NRBC # BLD: 0 /100 WBCS — SIGNIFICANT CHANGE UP (ref 0–0)
NRBC # FLD: 0 K/UL — SIGNIFICANT CHANGE UP (ref 0–0)
PLATELET # BLD AUTO: 305 K/UL — SIGNIFICANT CHANGE UP (ref 150–400)
POTASSIUM SERPL-MCNC: 4.4 MMOL/L — SIGNIFICANT CHANGE UP (ref 3.5–5.3)
POTASSIUM SERPL-SCNC: 4.4 MMOL/L — SIGNIFICANT CHANGE UP (ref 3.5–5.3)
PROT SERPL-MCNC: 6.8 G/DL — SIGNIFICANT CHANGE UP (ref 6–8.3)
RBC # BLD: 4.65 M/UL — SIGNIFICANT CHANGE UP (ref 3.8–5.2)
RBC # FLD: 15.1 % — HIGH (ref 10.3–14.5)
SODIUM SERPL-SCNC: 139 MMOL/L — SIGNIFICANT CHANGE UP (ref 135–145)
WBC # BLD: 8.77 K/UL — SIGNIFICANT CHANGE UP (ref 3.8–10.5)
WBC # FLD AUTO: 8.77 K/UL — SIGNIFICANT CHANGE UP (ref 3.8–10.5)

## 2022-11-30 PROCEDURE — 93010 ELECTROCARDIOGRAM REPORT: CPT

## 2022-11-30 NOTE — H&P PST ADULT - NSICDXPASTMEDICALHX_GEN_ALL_CORE_FT
PAST MEDICAL HISTORY:  Anxiety and depression     Asthma last rescue inhaler use-"it's been years"    Hyperlipidemia     Hypertension     Language barrier native language Arabic Creole; comprehends and verbzlizes minimal English    Pancreatic cancer September 2022    Type 2 diabetes mellitus

## 2022-11-30 NOTE — H&P PST ADULT - PROBLEM SELECTOR PLAN 1
Pt. is scheduled for insertion of power port 12/9/22.  Pt. verbalized understanding of instructions and that Chlorhexidine is for external use.

## 2022-11-30 NOTE — H&P PST ADULT - ASSESSMENT
Pt. is a 71 yo Creole speaking female accompanied by her sister.  Pt. has pancreatic cancer and will be starting chemotherapy.

## 2022-11-30 NOTE — H&P PST ADULT - NSANTHOSAYNRD_GEN_A_CORE
No. KIKI screening performed.  STOP BANG Legend: 0-2 = LOW Risk; 3-4 = INTERMEDIATE Risk; 5-8 = HIGH Risk

## 2022-11-30 NOTE — H&P PST ADULT - NSICDXPASTSURGICALHX_GEN_ALL_CORE_FT
PAST SURGICAL HISTORY:  H/O jaundice biliary stent in place    H/O: hysterectomy 1988    History of partial pancreatectomy with partial gastrectomy, cholecystectomy, and "small intestine" resection-9/2022

## 2022-11-30 NOTE — H&P PST ADULT - NSICDXFAMILYHX_GEN_ALL_CORE_FT
FAMILY HISTORY:  Sibling  Still living? Yes, Estimated age: Age Unknown  FH: diabetes mellitus, Age at diagnosis: Age Unknown  FH: diabetes mellitus, Age at diagnosis: Age Unknown  FH: diabetes mellitus, Age at diagnosis: Age Unknown  FH: diabetes mellitus, Age at diagnosis: Age Unknown  FH: diabetes mellitus, Age at diagnosis: Age Unknown  FH: diabetes mellitus, Age at diagnosis: Age Unknown  FH: diabetes mellitus, Age at diagnosis: Age Unknown

## 2022-11-30 NOTE — H&P PST ADULT - PROBLEM SELECTOR PLAN 2
Instructed pt. to take last dose of Invokana 12/5/22 and to take 20 Units of Tresiba morning of surgery.

## 2022-12-01 NOTE — AIRWAY PLACEMENT NOTE ADULT - POST AIRWAY PLACEMENT ASSESSMENT:
breath sounds bilateral/breath sounds equal/positive end tidal CO2 noted/CXR pending/chest excursion noted home

## 2022-12-08 NOTE — ASU PATIENT PROFILE, ADULT - FALL HARM RISK - UNIVERSAL INTERVENTIONS
Bed in lowest position, wheels locked, appropriate side rails in place/Call bell, personal items and telephone in reach/Instruct patient to call for assistance before getting out of bed or chair/Non-slip footwear when patient is out of bed/[place] to call system/Physically safe environment - no spills, clutter or unnecessary equipment/Purposeful Proactive Rounding/Room/bathroom lighting operational, light cord in reach

## 2022-12-08 NOTE — ASU PATIENT PROFILE, ADULT - PAIN CHRONIC, PROFILE
Received message from Roxana at employer that patient will be seeking care from [facility] and is having surgery 6/23.  Will close case and all further follow up with come from their office.    no

## 2022-12-09 ENCOUNTER — OUTPATIENT (OUTPATIENT)
Dept: OUTPATIENT SERVICES | Facility: HOSPITAL | Age: 72
LOS: 1 days | Discharge: HOME | End: 2022-12-09

## 2022-12-09 VITALS
DIASTOLIC BLOOD PRESSURE: 60 MMHG | TEMPERATURE: 98 F | OXYGEN SATURATION: 97 % | RESPIRATION RATE: 18 BRPM | HEIGHT: 64 IN | WEIGHT: 151.02 LBS | SYSTOLIC BLOOD PRESSURE: 136 MMHG | HEART RATE: 74 BPM

## 2022-12-09 VITALS
DIASTOLIC BLOOD PRESSURE: 50 MMHG | SYSTOLIC BLOOD PRESSURE: 131 MMHG | OXYGEN SATURATION: 97 % | RESPIRATION RATE: 18 BRPM | HEART RATE: 88 BPM

## 2022-12-09 DIAGNOSIS — Z90.411 ACQUIRED PARTIAL ABSENCE OF PANCREAS: Chronic | ICD-10-CM

## 2022-12-09 DIAGNOSIS — C25.9 MALIGNANT NEOPLASM OF PANCREAS, UNSPECIFIED: ICD-10-CM

## 2022-12-09 DIAGNOSIS — Z90.710 ACQUIRED ABSENCE OF BOTH CERVIX AND UTERUS: Chronic | ICD-10-CM

## 2022-12-09 DIAGNOSIS — Z87.898 PERSONAL HISTORY OF OTHER SPECIFIED CONDITIONS: Chronic | ICD-10-CM

## 2022-12-09 LAB
GLUCOSE BLDC GLUCOMTR-MCNC: 135 MG/DL — HIGH (ref 70–99)
SARS-COV-2 RNA SPEC QL NAA+PROBE: SIGNIFICANT CHANGE UP

## 2022-12-09 DEVICE — PORT POWER ISP MRI 9.6SI: Type: IMPLANTABLE DEVICE | Status: FUNCTIONAL

## 2022-12-09 DEVICE — IMPLANTABLE DEVICE: Type: IMPLANTABLE DEVICE | Status: FUNCTIONAL

## 2022-12-09 RX ORDER — IBUPROFEN 200 MG
400 TABLET ORAL ONCE
Refills: 0 | Status: DISCONTINUED | OUTPATIENT
Start: 2022-12-09 | End: 2022-12-23

## 2022-12-09 RX ORDER — ACETAMINOPHEN 500 MG
1000 TABLET ORAL EVERY 6 HOURS
Refills: 0 | Status: DISCONTINUED | OUTPATIENT
Start: 2022-12-09 | End: 2022-12-23

## 2022-12-09 RX ORDER — OXYCODONE HYDROCHLORIDE 5 MG/1
10 TABLET ORAL ONCE
Refills: 0 | Status: DISCONTINUED | OUTPATIENT
Start: 2022-12-09 | End: 2022-12-09

## 2022-12-09 RX ORDER — METOPROLOL TARTRATE 50 MG
1 TABLET ORAL
Qty: 0 | Refills: 0 | DISCHARGE

## 2022-12-09 RX ORDER — HYDRALAZINE HCL 50 MG
1 TABLET ORAL
Qty: 0 | Refills: 0 | DISCHARGE

## 2022-12-09 RX ORDER — FENTANYL CITRATE 50 UG/ML
25 INJECTION INTRAVENOUS
Refills: 0 | Status: DISCONTINUED | OUTPATIENT
Start: 2022-12-09 | End: 2022-12-09

## 2022-12-09 RX ORDER — CANAGLIFLOZIN 100 MG/1
1 TABLET, FILM COATED ORAL
Qty: 0 | Refills: 0 | DISCHARGE

## 2022-12-09 RX ORDER — OXYCODONE HYDROCHLORIDE 5 MG/1
5 TABLET ORAL ONCE
Refills: 0 | Status: DISCONTINUED | OUTPATIENT
Start: 2022-12-09 | End: 2022-12-09

## 2022-12-09 RX ORDER — SODIUM CHLORIDE 9 MG/ML
1000 INJECTION INTRAMUSCULAR; INTRAVENOUS; SUBCUTANEOUS
Refills: 0 | Status: DISCONTINUED | OUTPATIENT
Start: 2022-12-09 | End: 2022-12-23

## 2022-12-09 RX ORDER — ACETAMINOPHEN 500 MG
2 TABLET ORAL
Qty: 24 | Refills: 0
Start: 2022-12-09 | End: 2022-12-11

## 2022-12-09 RX ORDER — FENTANYL CITRATE 50 UG/ML
50 INJECTION INTRAVENOUS
Refills: 0 | Status: DISCONTINUED | OUTPATIENT
Start: 2022-12-09 | End: 2022-12-09

## 2022-12-09 RX ORDER — RISPERIDONE 4 MG/1
1 TABLET ORAL
Qty: 0 | Refills: 0 | DISCHARGE

## 2022-12-09 RX ORDER — ONDANSETRON 8 MG/1
4 TABLET, FILM COATED ORAL ONCE
Refills: 0 | Status: DISCONTINUED | OUTPATIENT
Start: 2022-12-09 | End: 2022-12-23

## 2022-12-09 RX ORDER — INSULIN DEGLUDEC 100 U/ML
25 INJECTION, SOLUTION SUBCUTANEOUS
Qty: 0 | Refills: 0 | DISCHARGE

## 2022-12-09 RX ORDER — IBUPROFEN 200 MG
1 TABLET ORAL
Qty: 8 | Refills: 0
Start: 2022-12-09 | End: 2022-12-10

## 2022-12-09 RX ORDER — OLMESARTAN MEDOXOMIL 5 MG/1
1 TABLET, FILM COATED ORAL
Qty: 0 | Refills: 0 | DISCHARGE

## 2022-12-09 NOTE — BRIEF OPERATIVE NOTE - NSICDXBRIEFPROCEDURE_GEN_ALL_CORE_FT
PROCEDURES:  Insertion of catheter with port 09-Dec-2022 09:54:14 PowerPort chemo insertion Melissa Eaton

## 2022-12-09 NOTE — ASU DISCHARGE PLAN (ADULT/PEDIATRIC) - NURSING INSTRUCTIONS
DO NOT take any Tylenol (Acetaminophen) or narcotics containing Tylenol until after  __2:45pm ____ . You received Tylenol during your operation and it can cause damage to your liver if too much is taken within a 24 hour time period.  Call MD for any reddness/drainage or swelling from incision.

## 2022-12-09 NOTE — ASU DISCHARGE PLAN (ADULT/PEDIATRIC) - NS MD DC FALL RISK RISK
For information on Fall & Injury Prevention, visit: [email or web address] OR  [email or web address] OR  https://www.cdc.gov/steadi/patient.html

## 2022-12-09 NOTE — ASU DISCHARGE PLAN (ADULT/PEDIATRIC) - MEDICATION INSTRUCTIONS
Please take tylenol 1000m every 6 hours for the first 2 days for pain control. If this does not provide enough relief you can take an additional 400mg of ibuprofen for pain.

## 2022-12-09 NOTE — ASU DISCHARGE PLAN (ADULT/PEDIATRIC) - CARE PROVIDER_API CALL
Rena Bach ([facility])  Surgery  [address], [address]  [place] [address]  Phone: [phone]  Fax: [phone]  Follow Up Time: 2 weeks

## 2022-12-09 NOTE — BRIEF OPERATIVE NOTE - OPERATION/FINDINGS
L powerport insertion into the cephalic vein within the groove of the deltoid and pectoralis major. No issues encountered intraoperatively, minimal blood loss.

## 2022-12-09 NOTE — ASU DISCHARGE PLAN (ADULT/PEDIATRIC) - ACTIVITY LEVEL
Exercise and weight bearing as tolerated Exercise and weight bearing as tolerated/No excercise/No heavy lifting

## 2023-08-18 NOTE — PHYSICAL THERAPY INITIAL EVALUATION ADULT - STANDING BALANCE: STATIC
Last CPX 7/10/2023.  It is listed in her chart, but I'm not sure if we have filled it/ Please send if ok    fair balance
